# Patient Record
Sex: FEMALE | Race: WHITE | NOT HISPANIC OR LATINO | URBAN - METROPOLITAN AREA
[De-identification: names, ages, dates, MRNs, and addresses within clinical notes are randomized per-mention and may not be internally consistent; named-entity substitution may affect disease eponyms.]

---

## 2017-04-01 ENCOUNTER — EMERGENCY (EMERGENCY)
Facility: HOSPITAL | Age: 27
LOS: 0 days | Discharge: HOME | End: 2017-04-01

## 2017-05-16 PROBLEM — Z00.00 ENCOUNTER FOR PREVENTIVE HEALTH EXAMINATION: Status: ACTIVE | Noted: 2017-05-16

## 2017-05-25 ENCOUNTER — APPOINTMENT (OUTPATIENT)
Dept: INTERNAL MEDICINE | Facility: CLINIC | Age: 27
End: 2017-05-25

## 2017-06-27 DIAGNOSIS — Z79.899 OTHER LONG TERM (CURRENT) DRUG THERAPY: ICD-10-CM

## 2017-06-27 DIAGNOSIS — B37.9 CANDIDIASIS, UNSPECIFIED: ICD-10-CM

## 2017-06-27 DIAGNOSIS — E11.65 TYPE 2 DIABETES MELLITUS WITH HYPERGLYCEMIA: ICD-10-CM

## 2017-06-27 DIAGNOSIS — E87.6 HYPOKALEMIA: ICD-10-CM

## 2017-06-27 DIAGNOSIS — R53.1 WEAKNESS: ICD-10-CM

## 2017-06-27 DIAGNOSIS — E83.42 HYPOMAGNESEMIA: ICD-10-CM

## 2017-06-27 DIAGNOSIS — Z79.84 LONG TERM (CURRENT) USE OF ORAL HYPOGLYCEMIC DRUGS: ICD-10-CM

## 2017-10-07 ENCOUNTER — EMERGENCY (EMERGENCY)
Facility: HOSPITAL | Age: 27
LOS: 0 days | Discharge: HOME | End: 2017-10-07

## 2017-10-07 DIAGNOSIS — S16.1XXA STRAIN OF MUSCLE, FASCIA AND TENDON AT NECK LEVEL, INITIAL ENCOUNTER: ICD-10-CM

## 2017-10-07 DIAGNOSIS — Z79.51 LONG TERM (CURRENT) USE OF INHALED STEROIDS: ICD-10-CM
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
## 2017-10-07 DIAGNOSIS — J45.909 UNSPECIFIED ASTHMA, UNCOMPLICATED: ICD-10-CM

## 2017-10-07 DIAGNOSIS — Z79.899 OTHER LONG TERM (CURRENT) DRUG THERAPY: ICD-10-CM

## 2017-10-07 DIAGNOSIS — E11.9 TYPE 2 DIABETES MELLITUS WITHOUT COMPLICATIONS: ICD-10-CM
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
## 2017-10-07 DIAGNOSIS — M54.2 CERVICALGIA: ICD-10-CM
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
## 2019-01-12 ENCOUNTER — EMERGENCY (EMERGENCY)
Facility: HOSPITAL | Age: 29
LOS: 0 days | Discharge: HOME | End: 2019-01-12
Attending: EMERGENCY MEDICINE | Admitting: EMERGENCY MEDICINE

## 2019-01-12 VITALS
OXYGEN SATURATION: 97 % | HEART RATE: 100 BPM | TEMPERATURE: 99 F | RESPIRATION RATE: 20 BRPM | SYSTOLIC BLOOD PRESSURE: 132 MMHG | DIASTOLIC BLOOD PRESSURE: 85 MMHG

## 2019-01-12 DIAGNOSIS — Z79.2 LONG TERM (CURRENT) USE OF ANTIBIOTICS: ICD-10-CM

## 2019-01-12 DIAGNOSIS — Z79.899 OTHER LONG TERM (CURRENT) DRUG THERAPY: ICD-10-CM

## 2019-01-12 DIAGNOSIS — M79.669 PAIN IN UNSPECIFIED LOWER LEG: ICD-10-CM

## 2019-01-12 DIAGNOSIS — E11.9 TYPE 2 DIABETES MELLITUS WITHOUT COMPLICATIONS: ICD-10-CM

## 2019-01-12 DIAGNOSIS — L02.416 CUTANEOUS ABSCESS OF LEFT LOWER LIMB: ICD-10-CM

## 2019-01-12 DIAGNOSIS — J45.909 UNSPECIFIED ASTHMA, UNCOMPLICATED: ICD-10-CM

## 2019-01-12 DIAGNOSIS — Z91.14 PATIENT'S OTHER NONCOMPLIANCE WITH MEDICATION REGIMEN: ICD-10-CM

## 2019-01-12 DIAGNOSIS — Z79.84 LONG TERM (CURRENT) USE OF ORAL HYPOGLYCEMIC DRUGS: ICD-10-CM

## 2019-01-12 RX ORDER — CEPHALEXIN 500 MG
1 CAPSULE ORAL
Qty: 30 | Refills: 0
Start: 2019-01-12 | End: 2019-01-21

## 2019-01-12 RX ORDER — METFORMIN HYDROCHLORIDE 850 MG/1
2 TABLET ORAL
Qty: 60 | Refills: 0
Start: 2019-01-12

## 2019-01-12 NOTE — ED PROVIDER NOTE - ATTENDING CONTRIBUTION TO CARE
I personally evaluated the patient. I reviewed the Resident’s or Physician Assistant’s note (as assigned above), and agree with the findings and plan except as documented in my note.    27 yo female hx of DM, eczema presents w left popliteal area abscess. Patient had an I&D performed w purulent drainage. Abx prescribed. Patient recommended to come back for wound check next week. Extensive return precautions provided. Gen Surgery f/u info given.

## 2019-01-12 NOTE — ED ADULT NURSE NOTE - NSIMPLEMENTINTERV_GEN_ALL_ED
Implemented All Universal Safety Interventions:  [place] to call system. Call bell, personal items and telephone within reach. Instruct patient to call for assistance. Room bathroom lighting operational. Non-slip footwear when patient is off stretcher. Physically safe environment: no spills, clutter or unnecessary equipment. Stretcher in lowest position, wheels locked, appropriate side rails in place.

## 2019-01-12 NOTE — ED PROVIDER NOTE - OBJECTIVE STATEMENT
29 yo female hx of DM (non-compliant to medication) and eczema present c/o pain and swelling and redness behind of her knee worsening over the past 2 days. reported she has been scratching her skin 2/2 her eczema. palpation and movement worsen the pain. denies fever/chill/red streaking. denies chest pain and sob.

## 2019-01-12 NOTE — ED PROVIDER NOTE - NS ED ROS FT
Constitutional: no fever, chills, no recent weight loss, change in appetite or malaise  Cardiac: No chest pain, SOB or edema.  Respiratory: No cough or respiratory distress  MS: no pain to back or extremities, no loss of ROM, no weakness  Skin: see HPI

## 2019-01-12 NOTE — ED PROVIDER NOTE - NSFOLLOWUPINSTRUCTIONS_ED_ALL_ED_FT
Abscess    An abscess is an infected area that contains a collection of pus and debris. It can occur in almost any part of the body and occurs when the tissue gets infection. Symptoms include a painful mass that is red, warm, tender that might break open and have drainage. If your health care provider gave you antibiotics make sure to take the full course and do not stop even if feeling better.     SEEK MEDICAL CARE IF YOU HAVE THE FOLLOWING SYMPTOMS: chills, fever, muscle aches, or red streaking from the area.

## 2019-01-12 NOTE — ED PROVIDER NOTE - CARE PLAN
Principal Discharge DX:	Abscess of lower leg  Secondary Diagnosis:	Diabetes  Secondary Diagnosis:	[place] Principal Discharge DX:	Abscess of lower leg  Secondary Diagnosis:	Diabetes

## 2019-01-12 NOTE — ED PROVIDER NOTE - PHYSICAL EXAMINATION
CONSTITUTIONAL: Well-appearing; well-nourished; in no apparent distress.   MS: left knee with FROM.   SKIN: + 2cm abscess behind the left knee with surrounding erythema and tenderness. + softness and fluctuant

## 2019-01-12 NOTE — ED PROVIDER NOTE - MEDICAL DECISION MAKING DETAILS
27 yo female hx of DM, eczema presents w left popliteal area abscess. Patient had an I&D performed w purulent drainage. Abx prescribed. Patient recommended to come back for wound check next week. Extensive return precautions provided. Gen Surgery f/u info given. I have full discussed the medical management and delivery of care with the patient. Patient confirms understanding and has been given detailed return precautions. Patient instructed to return to the ED should symptoms persist or worsen. Patient is well appearing upon discharge.

## 2019-01-18 ENCOUNTER — EMERGENCY (EMERGENCY)
Facility: HOSPITAL | Age: 29
LOS: 0 days | Discharge: HOME | End: 2019-01-18
Attending: EMERGENCY MEDICINE | Admitting: EMERGENCY MEDICINE

## 2019-01-18 VITALS
OXYGEN SATURATION: 98 % | SYSTOLIC BLOOD PRESSURE: 111 MMHG | RESPIRATION RATE: 16 BRPM | HEART RATE: 93 BPM | TEMPERATURE: 97 F | DIASTOLIC BLOOD PRESSURE: 86 MMHG

## 2019-01-18 DIAGNOSIS — Z48.00 ENCOUNTER FOR CHANGE OR REMOVAL OF NONSURGICAL WOUND DRESSING: ICD-10-CM

## 2019-01-18 DIAGNOSIS — J45.909 UNSPECIFIED ASTHMA, UNCOMPLICATED: ICD-10-CM

## 2019-01-18 DIAGNOSIS — Z79.84 LONG TERM (CURRENT) USE OF ORAL HYPOGLYCEMIC DRUGS: ICD-10-CM

## 2019-01-18 DIAGNOSIS — L02.416 CUTANEOUS ABSCESS OF LEFT LOWER LIMB: ICD-10-CM

## 2019-01-18 DIAGNOSIS — Z79.2 LONG TERM (CURRENT) USE OF ANTIBIOTICS: ICD-10-CM

## 2019-01-18 DIAGNOSIS — E11.9 TYPE 2 DIABETES MELLITUS WITHOUT COMPLICATIONS: ICD-10-CM

## 2019-01-18 DIAGNOSIS — Z79.899 OTHER LONG TERM (CURRENT) DRUG THERAPY: ICD-10-CM

## 2019-01-18 NOTE — ED PROVIDER NOTE - OBJECTIVE STATEMENT
29 y/o F with hx of L popliteal abscess drained by me last week presents for wound check. Pt states the redness improved greatly. Pain resolved. No fevers, chills. No nausea, vomiting.

## 2019-01-18 NOTE — ED PROVIDER NOTE - MEDICAL DECISION MAKING DETAILS
Patient wound healing well. She will f/u with her PMD. Return precautions given. I have full discussed the medical management and delivery of care with the patient. Patient confirms understanding and has been given detailed return precautions. Patient instructed to return to the ED should symptoms persist or worsen. Patient is well appearing upon discharge.

## 2019-01-18 NOTE — ED ADULT NURSE NOTE - NSIMPLEMENTINTERV_GEN_ALL_ED
Implemented All Universal Safety Interventions:  [place] to call system. Call bell, personal items and telephone within reach. Instruct patient to call for assistance. Room bathroom lighting operational. Non-slip footwear when patient is off stretcher. Physically safe environment: no spills, clutter or unnecessary equipment. Stretcher in lowest position, wheels locked, appropriate side rails in place.

## 2019-01-19 PROBLEM — J45.909 UNSPECIFIED ASTHMA, UNCOMPLICATED: Chronic | Status: ACTIVE | Noted: 2019-01-12

## 2019-01-19 PROBLEM — E11.9 TYPE 2 DIABETES MELLITUS WITHOUT COMPLICATIONS: Chronic | Status: ACTIVE | Noted: 2019-01-12

## 2019-06-02 ENCOUNTER — OUTPATIENT (OUTPATIENT)
Dept: OUTPATIENT SERVICES | Facility: HOSPITAL | Age: 29
LOS: 1 days | Discharge: HOME | End: 2019-06-02
Payer: COMMERCIAL

## 2019-06-02 DIAGNOSIS — O03.9 COMPLETE OR UNSPECIFIED SPONTANEOUS ABORTION WITHOUT COMPLICATION: ICD-10-CM

## 2019-06-02 DIAGNOSIS — R10.9 UNSPECIFIED ABDOMINAL PAIN: ICD-10-CM

## 2019-06-02 PROCEDURE — 76830 TRANSVAGINAL US NON-OB: CPT | Mod: 26

## 2019-06-06 ENCOUNTER — APPOINTMENT (OUTPATIENT)
Dept: OBGYN | Facility: CLINIC | Age: 29
End: 2019-06-06
Payer: COMMERCIAL

## 2019-06-06 ENCOUNTER — OUTPATIENT (OUTPATIENT)
Dept: OUTPATIENT SERVICES | Facility: HOSPITAL | Age: 29
LOS: 1 days | Discharge: HOME | End: 2019-06-06

## 2019-06-06 DIAGNOSIS — O03.9 COMPLETE OR UNSPECIFIED SPONTANEOUS ABORTION WITHOUT COMPLICATION: ICD-10-CM

## 2019-06-06 PROCEDURE — 99203 OFFICE O/P NEW LOW 30 MIN: CPT | Mod: 25

## 2019-06-06 PROCEDURE — 76817 TRANSVAGINAL US OBSTETRIC: CPT

## 2019-06-06 NOTE — PROCEDURE
[Abnormal Uterine Bleeding] : abnormal uterine bleeding [Pelvic Sonogram] : pelvic sonogram [Present] : uterus present [Anteverted] : anteverted [L: ___ cm] : L: [unfilled] cm [W: ___cm] : W: [unfilled] cm [H: ___ cm] : H: [unfilled] cm [FreeTextEntry5] : Endometrium .3cm.  [FreeTextEntry7] : 2 x 1 [FreeTextEntry8] : 2.6 x 2.7 polycystic [FreeTextEntry4] : Normal sonogram.

## 2019-06-06 NOTE — PHYSICAL EXAM
[Normal] : uterus [Scant] : there was scant vaginal bleeding [Old Blood] : old blood was present in the vagina [Uterine Adnexae] : were not tender and not enlarged [FreeTextEntry5] : closed.

## 2019-06-07 ENCOUNTER — OTHER (OUTPATIENT)
Age: 29
End: 2019-06-07

## 2019-06-07 LAB
ALBUMIN SERPL ELPH-MCNC: 4.6 G/DL
ALP BLD-CCNC: 93 U/L
ALT SERPL-CCNC: 23 U/L
ANION GAP SERPL CALC-SCNC: 15 MMOL/L
AST SERPL-CCNC: 13 U/L
BASOPHILS # BLD AUTO: 0.07 K/UL
BASOPHILS NFR BLD AUTO: 0.8 %
BILIRUB SERPL-MCNC: <0.2 MG/DL
BUN SERPL-MCNC: 17 MG/DL
CALCIUM SERPL-MCNC: 9.8 MG/DL
CHLORIDE SERPL-SCNC: 101 MMOL/L
CO2 SERPL-SCNC: 25 MMOL/L
CREAT SERPL-MCNC: 0.7 MG/DL
EOSINOPHIL # BLD AUTO: 0.18 K/UL
EOSINOPHIL NFR BLD AUTO: 1.9 %
ESTIMATED AVERAGE GLUCOSE: 151 MG/DL
GLUCOSE SERPL-MCNC: 118 MG/DL
HBA1C MFR BLD HPLC: 6.9 %
HCG SERPL-MCNC: 54.7 MIU/ML
HCT VFR BLD CALC: 44.6 %
HGB BLD-MCNC: 13.6 G/DL
HIV1+2 AB SPEC QL IA.RAPID: NONREACTIVE
IMM GRANULOCYTES NFR BLD AUTO: 0.3 %
LYMPHOCYTES # BLD AUTO: 2.43 K/UL
LYMPHOCYTES NFR BLD AUTO: 26 %
MAN DIFF?: NORMAL
MCHC RBC-ENTMCNC: 24.8 PG
MCHC RBC-ENTMCNC: 30.5 G/DL
MCV RBC AUTO: 81.4 FL
MONOCYTES # BLD AUTO: 0.52 K/UL
MONOCYTES NFR BLD AUTO: 5.6 %
NEUTROPHILS # BLD AUTO: 6.1 K/UL
NEUTROPHILS NFR BLD AUTO: 65.4 %
PLATELET # BLD AUTO: 338 K/UL
POTASSIUM SERPL-SCNC: 4 MMOL/L
PROGEST SERPL-MCNC: 0.2 NG/ML
PROT SERPL-MCNC: 7.1 G/DL
RBC # BLD: 5.48 M/UL
RBC # FLD: 14.7 %
SODIUM SERPL-SCNC: 141 MMOL/L
WBC # FLD AUTO: 9.33 K/UL

## 2019-06-11 ENCOUNTER — OUTPATIENT (OUTPATIENT)
Dept: OUTPATIENT SERVICES | Facility: HOSPITAL | Age: 29
LOS: 1 days | Discharge: HOME | End: 2019-06-11
Payer: COMMERCIAL

## 2019-06-11 VITALS
TEMPERATURE: 98 F | DIASTOLIC BLOOD PRESSURE: 70 MMHG | HEART RATE: 98 BPM | RESPIRATION RATE: 16 BRPM | HEIGHT: 64 IN | SYSTOLIC BLOOD PRESSURE: 98 MMHG | OXYGEN SATURATION: 98 % | WEIGHT: 236.12 LBS

## 2019-06-11 DIAGNOSIS — Z01.818 ENCOUNTER FOR OTHER PREPROCEDURAL EXAMINATION: ICD-10-CM

## 2019-06-11 DIAGNOSIS — O02.1 MISSED ABORTION: ICD-10-CM

## 2019-06-11 LAB
ANION GAP SERPL CALC-SCNC: 14 MMOL/L — SIGNIFICANT CHANGE UP (ref 7–14)
APPEARANCE UR: CLEAR — SIGNIFICANT CHANGE UP
BASOPHILS # BLD AUTO: 0.05 K/UL — SIGNIFICANT CHANGE UP (ref 0–0.2)
BASOPHILS NFR BLD AUTO: 0.5 % — SIGNIFICANT CHANGE UP (ref 0–1)
BILIRUB UR-MCNC: NEGATIVE — SIGNIFICANT CHANGE UP
BLD GP AB SCN SERPL QL: SIGNIFICANT CHANGE UP
BUN SERPL-MCNC: 22 MG/DL — HIGH (ref 10–20)
CALCIUM SERPL-MCNC: 9.8 MG/DL — SIGNIFICANT CHANGE UP (ref 8.5–10.1)
CHLORIDE SERPL-SCNC: 102 MMOL/L — SIGNIFICANT CHANGE UP (ref 98–110)
CO2 SERPL-SCNC: 25 MMOL/L — SIGNIFICANT CHANGE UP (ref 17–32)
COLOR SPEC: YELLOW — SIGNIFICANT CHANGE UP
CREAT SERPL-MCNC: 0.7 MG/DL — SIGNIFICANT CHANGE UP (ref 0.7–1.5)
DIFF PNL FLD: ABNORMAL
EOSINOPHIL # BLD AUTO: 0.16 K/UL — SIGNIFICANT CHANGE UP (ref 0–0.7)
EOSINOPHIL NFR BLD AUTO: 1.5 % — SIGNIFICANT CHANGE UP (ref 0–8)
EPI CELLS # UR: ABNORMAL /HPF
ESTIMATED AVERAGE GLUCOSE: 151 MG/DL — HIGH (ref 68–114)
GLUCOSE SERPL-MCNC: 115 MG/DL — HIGH (ref 70–99)
GLUCOSE UR QL: >=1000
HBA1C BLD-MCNC: 6.9 % — HIGH (ref 4–5.6)
HCT VFR BLD CALC: 44.7 % — SIGNIFICANT CHANGE UP (ref 37–47)
HGB BLD-MCNC: 13.7 G/DL — SIGNIFICANT CHANGE UP (ref 12–16)
IMM GRANULOCYTES NFR BLD AUTO: 0.3 % — SIGNIFICANT CHANGE UP (ref 0.1–0.3)
KETONES UR-MCNC: NEGATIVE — SIGNIFICANT CHANGE UP
LEUKOCYTE ESTERASE UR-ACNC: NEGATIVE — SIGNIFICANT CHANGE UP
LYMPHOCYTES # BLD AUTO: 2.38 K/UL — SIGNIFICANT CHANGE UP (ref 1.2–3.4)
LYMPHOCYTES # BLD AUTO: 22.9 % — SIGNIFICANT CHANGE UP (ref 20.5–51.1)
MCHC RBC-ENTMCNC: 24.4 PG — LOW (ref 27–31)
MCHC RBC-ENTMCNC: 30.6 G/DL — LOW (ref 32–37)
MCV RBC AUTO: 79.7 FL — LOW (ref 81–99)
MONOCYTES # BLD AUTO: 0.63 K/UL — HIGH (ref 0.1–0.6)
MONOCYTES NFR BLD AUTO: 6.1 % — SIGNIFICANT CHANGE UP (ref 1.7–9.3)
NEUTROPHILS # BLD AUTO: 7.16 K/UL — HIGH (ref 1.4–6.5)
NEUTROPHILS NFR BLD AUTO: 68.7 % — SIGNIFICANT CHANGE UP (ref 42.2–75.2)
NITRITE UR-MCNC: NEGATIVE — SIGNIFICANT CHANGE UP
NRBC # BLD: 0 /100 WBCS — SIGNIFICANT CHANGE UP (ref 0–0)
PH UR: 6.5 — SIGNIFICANT CHANGE UP (ref 5–8)
PLATELET # BLD AUTO: 368 K/UL — SIGNIFICANT CHANGE UP (ref 130–400)
POTASSIUM SERPL-MCNC: 4.5 MMOL/L — SIGNIFICANT CHANGE UP (ref 3.5–5)
POTASSIUM SERPL-SCNC: 4.5 MMOL/L — SIGNIFICANT CHANGE UP (ref 3.5–5)
PROT UR-MCNC: NEGATIVE — SIGNIFICANT CHANGE UP
RBC # BLD: 5.61 M/UL — HIGH (ref 4.2–5.4)
RBC # FLD: 14.6 % — HIGH (ref 11.5–14.5)
RBC CASTS # UR COMP ASSIST: ABNORMAL /HPF
SODIUM SERPL-SCNC: 141 MMOL/L — SIGNIFICANT CHANGE UP (ref 135–146)
SP GR SPEC: >=1.03 — SIGNIFICANT CHANGE UP (ref 1.01–1.03)
UROBILINOGEN FLD QL: 0.2 — SIGNIFICANT CHANGE UP (ref 0.2–0.2)
WBC # BLD: 10.41 K/UL — SIGNIFICANT CHANGE UP (ref 4.8–10.8)
WBC # FLD AUTO: 10.41 K/UL — SIGNIFICANT CHANGE UP (ref 4.8–10.8)

## 2019-06-11 PROCEDURE — 93010 ELECTROCARDIOGRAM REPORT: CPT

## 2019-06-11 NOTE — H&P PST ADULT - REASON FOR ADMISSION
27 yo female presents s/p miscarriage, "the numbers are not going down the way it should, I need a d&c";  denies chest pain, palpitations, shortness of breath, dyspnea, or dysuria. exercise tolerance: 2+ blocks/ flights of stairs w/o sob

## 2019-06-14 ENCOUNTER — OUTPATIENT (OUTPATIENT)
Dept: OUTPATIENT SERVICES | Facility: HOSPITAL | Age: 29
LOS: 1 days | Discharge: HOME | End: 2019-06-14
Payer: COMMERCIAL

## 2019-06-14 ENCOUNTER — RESULT REVIEW (OUTPATIENT)
Age: 29
End: 2019-06-14

## 2019-06-14 VITALS
TEMPERATURE: 98 F | DIASTOLIC BLOOD PRESSURE: 55 MMHG | RESPIRATION RATE: 20 BRPM | HEART RATE: 98 BPM | HEIGHT: 64 IN | WEIGHT: 236.12 LBS | SYSTOLIC BLOOD PRESSURE: 119 MMHG

## 2019-06-14 VITALS
SYSTOLIC BLOOD PRESSURE: 103 MMHG | DIASTOLIC BLOOD PRESSURE: 54 MMHG | TEMPERATURE: 98 F | HEART RATE: 84 BPM | RESPIRATION RATE: 20 BRPM | OXYGEN SATURATION: 96 %

## 2019-06-14 LAB
GLUCOSE BLDC GLUCOMTR-MCNC: 139 MG/DL — HIGH (ref 70–99)
HCG SERPL-ACNC: 9.7 MIU/ML — HIGH

## 2019-06-14 PROCEDURE — 88305 TISSUE EXAM BY PATHOLOGIST: CPT | Mod: 26

## 2019-06-14 PROCEDURE — 59820 CARE OF MISCARRIAGE: CPT

## 2019-06-14 RX ORDER — SODIUM CHLORIDE 9 MG/ML
1000 INJECTION, SOLUTION INTRAVENOUS
Refills: 0 | Status: DISCONTINUED | OUTPATIENT
Start: 2019-06-14 | End: 2019-06-29

## 2019-06-14 RX ORDER — ACETAMINOPHEN 500 MG
1000 TABLET ORAL ONCE
Refills: 0 | Status: COMPLETED | OUTPATIENT
Start: 2019-06-14 | End: 2019-06-14

## 2019-06-14 RX ORDER — ONDANSETRON 8 MG/1
4 TABLET, FILM COATED ORAL ONCE
Refills: 0 | Status: DISCONTINUED | OUTPATIENT
Start: 2019-06-14 | End: 2019-06-29

## 2019-06-14 RX ADMIN — Medication 400 MILLIGRAM(S): at 12:31

## 2019-06-14 RX ADMIN — SODIUM CHLORIDE 100 MILLILITER(S): 9 INJECTION, SOLUTION INTRAVENOUS at 11:45

## 2019-06-14 NOTE — ASU PREOP CHECKLIST - TAMPON REMOVED
Patient : Corby Talavera Age: 29 year old Sex: female   MRN: [id number] Encounter Date: 11/29/2018      History     Chief Complaint   Patient presents with   • Derm Problem     HPI  Patient presents to the ED with swelling to the right side of the forehead, small, painful, started few days ago, currently not draining. Denies any fever, nausea or vomiting.  No Known Allergies       Medication List      Prior to Admission Medications      Sig   * albuterol 108 (90 Base) MCG/ACT inhaler   2 puffs, Inhalation, EVERY 4 HOURS PRN     * albuterol (2.5 MG/3ML) 0.083% nebulizer solution  Commonly known as:  VENTOLIN   2.5 mg, Nebulization, EVERY 6 HOURS PRN     ALBUTEROL IN   Inhalation     CLARITIN PO   Oral     ferrous sulfate 325 (65 FE) MG tablet   325 mg, DAILY WITH BREAKFAST     ibuprofen 600 MG tablet  Commonly known as:  MOTRIN   600 mg, Oral, EVERY 8 HOURS PRN     LORazepam 0.5 MG tablet  Commonly known as:  ATIVAN   0.5 mg, EVERY 6 HOURS PRN     sertraline 50 MG tablet  Commonly known as:  ZOLOFT   50 mg, DAILY     SINGULAIR PO   Oral     sulfamethoxazole-trimethoprim 800-160 MG per tablet  Commonly known as:  BACTRIM DS   1 tablet, Oral, 2 TIMES DAILY         * There are duplicate medications prescribed to the patient                Past Medical History:   Diagnosis Date   • Anemia    • Anxiety    • Depression    • Hypoglycemia    • Mental disorder    • PTSD (post-traumatic stress disorder)    • RAD (reactive airway disease)    • Seizures (CMS/HCC)     last early 2015       No past surgical history on file.    No family history on file.    Social History     Tobacco Use   • Smoking status: Never Smoker   • Smokeless tobacco: Never Used   Substance Use Topics   • Alcohol use: No     Alcohol/week: 0.0 oz   • Drug use: No       Review of Systems   Constitutional: Negative for fever.   Respiratory: Negative for cough and shortness of breath.    Cardiovascular: Negative for chest pain.       Physical Exam     ED Triage  Vitals [11/28/18 2233]   ED Triage Vitals Group      Temp 97.4 °F (36.3 °C)      Pulse 87      Resp 18      /75      SpO2 100 %      EtCO2 mmHg       Height       Weight       Weight Scale Used        Physical Exam   Constitutional: She is oriented to person, place, and time.   HENT:   Head:       1 cm swelling to the right side of the forehead, fluctuant, drained successfully with aspiration, consistent with sebaceous cyst.   Cardiovascular: Normal rate and regular rhythm.   Pulmonary/Chest: Effort normal and breath sounds normal. She has no wheezes. She has no rales.   Neurological: She is alert and oriented to person, place, and time.     ED Course     Incision and Drainage  Date/Time: 11/29/2018 1:11 AM  Performed by: ABDIRASHID Koenig  Authorized by: ABDIRASHID Koenig     Consent:     Consent obtained:  Verbal    Consent given by:  Patient  Location:     Type:  Cyst    Size:  1 cm    Location:  Head    Head location:  Face  Pre-procedure details:     Skin preparation:  Chloraprep  Anesthesia (see MAR for exact dosages):     Anesthesia method:  Local infiltration    Local anesthetic:  Lidocaine 1% w/o epi  Procedure type:     Complexity:  Simple  Procedure details:     Needle aspiration: yes      Needle size:  18 G    Incision types:  Single straight  Post-procedure details:     Patient tolerance of procedure:  Tolerated well, no immediate complications        Lab Results     No results found for this visit on 11/29/18.    Radiology Results     Imaging Results    None         ED Medication Orders (From admission, onward)    Start Ordered     Status Ordering Provider    11/29/18 0102 11/29/18 0102  LIDOCAINE HCL 1 % IJ JORGE LUIS Zuniga Override     Comments:  Bence, Laura: cabinet override    Ordered     11/29/18 0058 11/29/18 0052  lidocaine HCl (XYLOCAINE) 1 % injection 50 mg  ONCE      Acknowledged [place] AYALA           MDM    Clinical Impression     ED Diagnosis   1. Sebaceous cyst         Disposition      1:11 AM  Recheck on patient. Patient was diagnosed with specialist, drained successfully with aspiration, she was instructed follow-up with PCP and she presents understanding. Discussed with patient ED findings and plan for discharge. Patient was given ED warnings, discharge instructions, and follow up information to go home with. Patient understands and agrees with plan for discharge. Any questions have been answered.    Discharge 11/29/2018  1:17 AM  Corby Talavera discharge to home/self care.         ABDIRASHID Koenig  11/29/18 0117     n/a

## 2019-06-14 NOTE — CHART NOTE - NSCHARTNOTEFT_GEN_A_CORE
PACU ANESTHESIA ADMISSION NOTE      Procedure: Dilation and curettage, uterus    Post op diagnosis:  Missed       ____  Intubated  TV:______       Rate: ______      FiO2: ______    _X___  Patent Airway    __X__  Full return of protective reflexes    __X_  Full recovery from anesthesia / back to baseline     Vitals:   T:   98.1        R: 14                 BP: 139/82                 Sat:96                   P: 86      Mental Status:  ___X_ Awake   _____ Alert   _____ Drowsy   _____ Sedated    Nausea/Vomiting:  _X___ NO  ______Yes,   See Post - Op Orders          Pain Scale (0-10):  __0___    Treatment: ____ None    ____ See Post - Op/PCA Orders    Post - Operative Fluids:   ____ Oral   X____ See Post - Op Orders    Plan: Discharge:   _X__Home       _____Floor     _____Critical Care    _____  Other:_________________    Comments:  Uneventful course of anesthesia

## 2019-06-14 NOTE — BRIEF OPERATIVE NOTE - OPERATION/FINDINGS
6-8 week sized anteverted uterus   scant amount of products of conception   cervix C/L/P 6-8 week sized anteverted uterus   scant amounts of products of conception   cervix C/L/P

## 2019-06-17 LAB — SURGICAL PATHOLOGY STUDY: SIGNIFICANT CHANGE UP

## 2019-06-18 PROBLEM — L30.9 DERMATITIS, UNSPECIFIED: Chronic | Status: ACTIVE | Noted: 2019-06-11

## 2019-06-20 ENCOUNTER — APPOINTMENT (OUTPATIENT)
Dept: OBGYN | Facility: CLINIC | Age: 29
End: 2019-06-20
Payer: COMMERCIAL

## 2019-06-20 VITALS
HEIGHT: 64 IN | BODY MASS INDEX: 40.97 KG/M2 | WEIGHT: 240 LBS | SYSTOLIC BLOOD PRESSURE: 130 MMHG | DIASTOLIC BLOOD PRESSURE: 62 MMHG

## 2019-06-20 VITALS — WEIGHT: 240 LBS | BODY MASS INDEX: 40.97 KG/M2 | HEIGHT: 64 IN

## 2019-06-20 DIAGNOSIS — E11.9 TYPE 2 DIABETES MELLITUS WITHOUT COMPLICATIONS: ICD-10-CM

## 2019-06-20 DIAGNOSIS — Z87.59 PERSONAL HISTORY OF OTHER COMPLICATIONS OF PREGNANCY, CHILDBIRTH AND THE PUERPERIUM: ICD-10-CM

## 2019-06-20 DIAGNOSIS — E66.9 OBESITY, UNSPECIFIED: ICD-10-CM

## 2019-06-20 DIAGNOSIS — O02.1 MISSED ABORTION: ICD-10-CM

## 2019-06-20 DIAGNOSIS — J45.909 UNSPECIFIED ASTHMA, UNCOMPLICATED: ICD-10-CM

## 2019-06-20 PROCEDURE — 99024 POSTOP FOLLOW-UP VISIT: CPT

## 2019-12-19 ENCOUNTER — OUTPATIENT (OUTPATIENT)
Dept: OUTPATIENT SERVICES | Facility: HOSPITAL | Age: 29
LOS: 1 days | Discharge: HOME | End: 2019-12-19

## 2019-12-19 ENCOUNTER — APPOINTMENT (OUTPATIENT)
Dept: OBGYN | Facility: CLINIC | Age: 29
End: 2019-12-19
Payer: COMMERCIAL

## 2019-12-19 VITALS
SYSTOLIC BLOOD PRESSURE: 126 MMHG | DIASTOLIC BLOOD PRESSURE: 68 MMHG | WEIGHT: 255 LBS | HEIGHT: 64 IN | BODY MASS INDEX: 43.54 KG/M2

## 2019-12-19 DIAGNOSIS — Z01.419 ENCOUNTER FOR GYNECOLOGICAL EXAMINATION (GENERAL) (ROUTINE) WITHOUT ABNORMAL FINDINGS: ICD-10-CM

## 2019-12-19 PROCEDURE — 99395 PREV VISIT EST AGE 18-39: CPT

## 2019-12-28 LAB
ESTRADIOL SERPL-MCNC: 21 PG/ML
FSH SERPL-MCNC: 8.3 IU/L
HPV HIGH+LOW RISK DNA PNL CVX: NOT DETECTED
PROLACTIN SERPL-MCNC: 7.6 NG/ML
T4 FREE SERPL-MCNC: 1.3 NG/DL
TESTOST BND SERPL-MCNC: 3.2 PG/ML
TESTOST SERPL-MCNC: 16.1 NG/DL

## 2020-01-02 ENCOUNTER — OTHER (OUTPATIENT)
Age: 30
End: 2020-01-02

## 2020-01-02 LAB
A VAGINAE DNA VAG QL NAA+PROBE: NORMAL
BVAB2 DNA VAG QL NAA+PROBE: NORMAL
C KRUSEI DNA VAG QL NAA+PROBE: NEGATIVE
C TRACH RRNA SPEC QL NAA+PROBE: NEGATIVE
ESTIMATED AVERAGE GLUCOSE: 177 MG/DL
HBA1C MFR BLD HPLC: 7.8 %
MEGA1 DNA VAG QL NAA+PROBE: NORMAL
N GONORRHOEA RRNA SPEC QL NAA+PROBE: NEGATIVE
T VAGINALIS RRNA SPEC QL NAA+PROBE: NEGATIVE
TSH SERPL-ACNC: 4.66 UIU/ML

## 2020-07-20 NOTE — ASU PREOP CHECKLIST - IDENTIFICATION BAND VERIFIED
done Minoxidil Counseling: Minoxidil is a topical medication which can increase blood flow where it is applied. It is uncertain how this medication increases hair growth. Side effects are uncommon and include stinging and allergic reactions.

## 2021-12-13 ENCOUNTER — EMERGENCY (EMERGENCY)
Facility: HOSPITAL | Age: 31
LOS: 0 days | Discharge: HOME | End: 2021-12-13
Attending: EMERGENCY MEDICINE | Admitting: EMERGENCY MEDICINE
Payer: MEDICAID

## 2021-12-13 VITALS
SYSTOLIC BLOOD PRESSURE: 139 MMHG | TEMPERATURE: 98 F | OXYGEN SATURATION: 100 % | WEIGHT: 250 LBS | HEIGHT: 64 IN | DIASTOLIC BLOOD PRESSURE: 83 MMHG | RESPIRATION RATE: 20 BRPM | HEART RATE: 123 BPM

## 2021-12-13 VITALS
HEART RATE: 100 BPM | DIASTOLIC BLOOD PRESSURE: 56 MMHG | SYSTOLIC BLOOD PRESSURE: 128 MMHG | RESPIRATION RATE: 19 BRPM | OXYGEN SATURATION: 100 %

## 2021-12-13 DIAGNOSIS — R00.2 PALPITATIONS: ICD-10-CM

## 2021-12-13 DIAGNOSIS — J45.909 UNSPECIFIED ASTHMA, UNCOMPLICATED: ICD-10-CM

## 2021-12-13 DIAGNOSIS — E11.9 TYPE 2 DIABETES MELLITUS WITHOUT COMPLICATIONS: ICD-10-CM

## 2021-12-13 DIAGNOSIS — F41.9 ANXIETY DISORDER, UNSPECIFIED: ICD-10-CM

## 2021-12-13 DIAGNOSIS — R00.0 TACHYCARDIA, UNSPECIFIED: ICD-10-CM

## 2021-12-13 LAB
ALBUMIN SERPL ELPH-MCNC: 4.5 G/DL — SIGNIFICANT CHANGE UP (ref 3.5–5.2)
ALP SERPL-CCNC: 126 U/L — HIGH (ref 30–115)
ALT FLD-CCNC: 27 U/L — SIGNIFICANT CHANGE UP (ref 0–41)
ANION GAP SERPL CALC-SCNC: 16 MMOL/L — HIGH (ref 7–14)
AST SERPL-CCNC: 14 U/L — SIGNIFICANT CHANGE UP (ref 0–41)
BASOPHILS # BLD AUTO: 0.06 K/UL — SIGNIFICANT CHANGE UP (ref 0–0.2)
BASOPHILS NFR BLD AUTO: 0.6 % — SIGNIFICANT CHANGE UP (ref 0–1)
BILIRUB SERPL-MCNC: <0.2 MG/DL — SIGNIFICANT CHANGE UP (ref 0.2–1.2)
BUN SERPL-MCNC: 13 MG/DL — SIGNIFICANT CHANGE UP (ref 10–20)
CALCIUM SERPL-MCNC: 9.1 MG/DL — SIGNIFICANT CHANGE UP (ref 8.5–10.1)
CHLORIDE SERPL-SCNC: 100 MMOL/L — SIGNIFICANT CHANGE UP (ref 98–110)
CO2 SERPL-SCNC: 21 MMOL/L — SIGNIFICANT CHANGE UP (ref 17–32)
CREAT SERPL-MCNC: 0.6 MG/DL — LOW (ref 0.7–1.5)
D DIMER BLD IA.RAPID-MCNC: 36 NG/ML DDU — SIGNIFICANT CHANGE UP (ref 0–230)
EOSINOPHIL # BLD AUTO: 0.38 K/UL — SIGNIFICANT CHANGE UP (ref 0–0.7)
EOSINOPHIL NFR BLD AUTO: 3.6 % — SIGNIFICANT CHANGE UP (ref 0–8)
GLUCOSE SERPL-MCNC: 156 MG/DL — HIGH (ref 70–99)
HCG SERPL QL: NEGATIVE — SIGNIFICANT CHANGE UP
HCT VFR BLD CALC: 46.7 % — SIGNIFICANT CHANGE UP (ref 37–47)
HGB BLD-MCNC: 14.4 G/DL — SIGNIFICANT CHANGE UP (ref 12–16)
IMM GRANULOCYTES NFR BLD AUTO: 0.5 % — HIGH (ref 0.1–0.3)
LYMPHOCYTES # BLD AUTO: 2.5 K/UL — SIGNIFICANT CHANGE UP (ref 1.2–3.4)
LYMPHOCYTES # BLD AUTO: 23.6 % — SIGNIFICANT CHANGE UP (ref 20.5–51.1)
MAGNESIUM SERPL-MCNC: 2 MG/DL — SIGNIFICANT CHANGE UP (ref 1.8–2.4)
MCHC RBC-ENTMCNC: 24.3 PG — LOW (ref 27–31)
MCHC RBC-ENTMCNC: 30.8 G/DL — LOW (ref 32–37)
MCV RBC AUTO: 78.9 FL — LOW (ref 81–99)
MONOCYTES # BLD AUTO: 0.51 K/UL — SIGNIFICANT CHANGE UP (ref 0.1–0.6)
MONOCYTES NFR BLD AUTO: 4.8 % — SIGNIFICANT CHANGE UP (ref 1.7–9.3)
NEUTROPHILS # BLD AUTO: 7.08 K/UL — HIGH (ref 1.4–6.5)
NEUTROPHILS NFR BLD AUTO: 66.9 % — SIGNIFICANT CHANGE UP (ref 42.2–75.2)
NRBC # BLD: 0 /100 WBCS — SIGNIFICANT CHANGE UP (ref 0–0)
NT-PROBNP SERPL-SCNC: 38 PG/ML — SIGNIFICANT CHANGE UP (ref 0–300)
PLATELET # BLD AUTO: 339 K/UL — SIGNIFICANT CHANGE UP (ref 130–400)
POTASSIUM SERPL-MCNC: 3.7 MMOL/L — SIGNIFICANT CHANGE UP (ref 3.5–5)
POTASSIUM SERPL-SCNC: 3.7 MMOL/L — SIGNIFICANT CHANGE UP (ref 3.5–5)
PROT SERPL-MCNC: 7.5 G/DL — SIGNIFICANT CHANGE UP (ref 6–8)
RBC # BLD: 5.92 M/UL — HIGH (ref 4.2–5.4)
RBC # FLD: 15.3 % — HIGH (ref 11.5–14.5)
SODIUM SERPL-SCNC: 137 MMOL/L — SIGNIFICANT CHANGE UP (ref 135–146)
TROPONIN T SERPL-MCNC: <0.01 NG/ML — SIGNIFICANT CHANGE UP
WBC # BLD: 10.58 K/UL — SIGNIFICANT CHANGE UP (ref 4.8–10.8)
WBC # FLD AUTO: 10.58 K/UL — SIGNIFICANT CHANGE UP (ref 4.8–10.8)

## 2021-12-13 PROCEDURE — 71045 X-RAY EXAM CHEST 1 VIEW: CPT | Mod: 26

## 2021-12-13 PROCEDURE — 99285 EMERGENCY DEPT VISIT HI MDM: CPT

## 2021-12-13 PROCEDURE — 93010 ELECTROCARDIOGRAM REPORT: CPT

## 2021-12-13 RX ORDER — HYDROXYZINE HCL 10 MG
50 TABLET ORAL ONCE
Refills: 0 | Status: COMPLETED | OUTPATIENT
Start: 2021-12-13 | End: 2021-12-13

## 2021-12-13 RX ORDER — METOPROLOL TARTRATE 50 MG
25 TABLET ORAL ONCE
Refills: 0 | Status: COMPLETED | OUTPATIENT
Start: 2021-12-13 | End: 2021-12-13

## 2021-12-13 RX ORDER — SODIUM CHLORIDE 9 MG/ML
1000 INJECTION, SOLUTION INTRAVENOUS ONCE
Refills: 0 | Status: COMPLETED | OUTPATIENT
Start: 2021-12-13 | End: 2021-12-13

## 2021-12-13 RX ADMIN — SODIUM CHLORIDE 1000 MILLILITER(S): 9 INJECTION, SOLUTION INTRAVENOUS at 02:19

## 2021-12-13 RX ADMIN — Medication 50 MILLIGRAM(S): at 02:19

## 2021-12-13 RX ADMIN — Medication 25 MILLIGRAM(S): at 03:55

## 2021-12-13 NOTE — ED PROVIDER NOTE - OBJECTIVE STATEMENT
30 yo F with PMHx DM, PVCs, and asthma presents to the ED c/o palpitations that started while at rest tonight. Pt states she intermittently gets palpitations but frequency has been increasing and tonight symptoms lasted longer than usual so she came to ED. Pt has previously seen cardiology and was prescribed Cardizem to take as needed (pt states her cardiologist gave it to her hesitantly so she never actually takes it). Pt states palpitations have been intermittent for past few months and started after her mother passed away. Pt is going to see psychiatrist as well to see if these symptoms might be related to anxiety. She denies other complaints. She is non-smoker, does not take any OCPs.

## 2021-12-13 NOTE — ED PROVIDER NOTE - NSFOLLOWUPINSTRUCTIONS_ED_ALL_ED_FT
.Heart Palpitations    WHAT YOU NEED TO KNOW:    Heart palpitations are feelings that your heart races, jumps, throbs, or flutters. You may feel extra beats, no beats for a short time, or skipped beats. You may have these feelings in your chest, throat, or neck. They may happen when you are sitting, standing, or lying. Heart palpitations may be frightening, but are usually not caused by a serious problem.     DISCHARGE INSTRUCTIONS:    Call 911 or have someone else call for any of the following:     You have any of the following signs of a heart attack:   Squeezing, pressure, or pain in your chest      You may also have any of the following:   Discomfort or pain in your back, neck, jaw, stomach, or arm      Shortness of breath      Nausea or vomiting      Lightheadedness or a sudden cold sweat      You have any of the following signs of a stroke:   Numbness or drooping on one side of your face       Weakness in an arm or leg      Confusion or difficulty speaking      Dizziness, a severe headache, or vision loss      You faint or lose consciousness.     Return to the emergency department if:     Your palpitations happen more often or get more intense.         Contact your healthcare provider if:     You have new or worsening swelling in your feet or ankles.      You have questions or concerns about your condition or care.    Follow up with your healthcare provider as directed: You may need to follow up with a cardiologist. You may need tests to check for heart problems that cause palpitations. Write down your questions so you remember to ask them during your visits.     Keep a record: Write down when your palpitations start and stop, what you were doing when they started, and your symptoms. Keep track of what you ate or drank within a few hours of your palpitations. Include anything that seemed to help your symptoms, such as lying down or holding your breath. This record will help you and your healthcare provider learn what triggers your palpitations. Bring this record with you to your follow up visits.    Help prevent heart palpitations:     Manage stress and anxiety. Find ways to relax such as listening to music, meditating, or doing yoga. Exercise can also help decrease stress and anxiety. Talk to someone you trust about your stress or anxiety. You can also talk to a therapist.       Get plenty of sleep every night. Ask your healthcare provider how much sleep you need each night.       Do not drink caffeine or alcohol. Caffeine and alcohol can make your palpitations worse. Caffeine is found in soda, coffee, tea, chocolate, and drinks that increase your energy.       Do not smoke. Nicotine and other chemicals in cigarettes and cigars may damage your heart and blood vessels. Ask your healthcare provider for information if you currently smoke and need help to quit. E-cigarettes or smokeless tobacco still contain nicotine. Talk to your healthcare provider before you use these products.       Do not use illegal drugs. Talk to your healthcare provider if you use illegal drugs and want help to quit.          © Copyright [employer] 2019 All illustrations and images included in CareNotes are the copyrighted property of [employer] or [employer].    Follow up with your cardiologist in 2 days.

## 2021-12-13 NOTE — ED PROVIDER NOTE - CLINICAL SUMMARY MEDICAL DECISION MAKING FREE TEXT BOX
Labs unremarkable.  EKG sinus tach vipin cute changes.  CXR negative.  Will D/C to follow up with cardiology.

## 2021-12-13 NOTE — ED PROVIDER NOTE - NS ED ROS FT
Review of Systems  Constitutional:  No fever, chills.  Eyes:  No visual changes, eye pain, or discharge.  ENMT:  No hearing changes, pain, or discharge. No nasal congestion, discharge, or bleeding. No throat pain, swelling, or difficulty swallowing.  Cardiac:  No chest pain, syncope, or edema. (+) palpitations  Respiratory:  No dyspnea, cough. No hemoptysis.  GI:  No nausea, vomiting, diarrhea, or abdominal pain.   :  No dysuria, hematuria, frequency, or burning.   MS:  No back pain.  Skin:  No skin rash, pruritis, jaundice, or lesions.  Neuro:  No headache, dizziness, loss of sensation, or focal weakness.  No change in mental status.

## 2021-12-13 NOTE — ED PROVIDER NOTE - ATTENDING CONTRIBUTION TO CARE
I personally evaluated the patient. I reviewed the Resident’s or Physician Assistant’s note (as assigned above), and agree with the findings and plan except as documented in my note.  Chart reviewed.  H/O tachycardia, presents with palpitations tonight.  No chest pain or SOB.  Has seen a cardiologist in past for same symptoms.  Exam shows alert patient in no distress, HEENT NCAT, lungs clear, tachycardic, S1S2, abdomen soft Nt +BS, no CCE.

## 2021-12-13 NOTE — ED PROVIDER NOTE - PATIENT PORTAL LINK FT
You can access the FollowMyHealth Patient Portal offered by [facility] by registering at the following website: [email or web address]. By joining [facility]’s FollowMyHealth portal, you will also be able to view your health information using other applications (apps) compatible with our system.

## 2021-12-14 ENCOUNTER — APPOINTMENT (OUTPATIENT)
Dept: CARDIOLOGY | Facility: CLINIC | Age: 31
End: 2021-12-14
Payer: MEDICAID

## 2021-12-14 VITALS
HEART RATE: 104 BPM | HEIGHT: 64 IN | BODY MASS INDEX: 42.68 KG/M2 | TEMPERATURE: 97.3 F | DIASTOLIC BLOOD PRESSURE: 71 MMHG | WEIGHT: 250 LBS | SYSTOLIC BLOOD PRESSURE: 115 MMHG

## 2021-12-14 DIAGNOSIS — R00.0 TACHYCARDIA, UNSPECIFIED: ICD-10-CM

## 2021-12-14 DIAGNOSIS — J45.909 UNSPECIFIED ASTHMA, UNCOMPLICATED: ICD-10-CM

## 2021-12-14 DIAGNOSIS — Z78.9 OTHER SPECIFIED HEALTH STATUS: ICD-10-CM

## 2021-12-14 PROCEDURE — 99204 OFFICE O/P NEW MOD 45 MIN: CPT

## 2021-12-14 PROCEDURE — 93000 ELECTROCARDIOGRAM COMPLETE: CPT

## 2021-12-14 RX ORDER — NORGESTIMATE AND ETHINYL ESTRADIOL 7DAYSX3 28
0.18/0.215/0.25 KIT ORAL DAILY
Qty: 28 | Refills: 11 | Status: COMPLETED | COMMUNITY
Start: 2019-06-20 | End: 2021-12-14

## 2021-12-14 NOTE — ASSESSMENT
[FreeTextEntry1] : 30 y/o female with palpitations\par \par Risk factors of DM, obesity\par ER w/u for PE, MI - negative\par \par Recommend:\par \par 2d echo to r/o structural heart disease.\par MCOT for 14 days to r/o arrhythmia.\par DM control\par Check HgA1c, TSH with the PMD. Labs form the ER noted.\par Hydration recommended\par Minimize albuterol.\par Avoid caffein, soda, etc.\par Patient was advised about healthy lifestyle changes, including diet and exercise. Importance of sustained long-term weight loss was discussed, questions answered.\par F/u after the tests.\par

## 2021-12-14 NOTE — HISTORY OF PRESENT ILLNESS
[FreeTextEntry1] : 32 y/o female with history of DM, DL, thyroid disease, obesity, asthma, presenting for evaluation of episodes of palpitations, tachycardia. Patient reports episodes of tachycardia to 130's. She was in ER recently, DD - negative, troponin - negative, was discharged and advised to f/u with cardiology. No chest pain.

## 2022-01-05 ENCOUNTER — NON-APPOINTMENT (OUTPATIENT)
Age: 32
End: 2022-01-05

## 2022-01-20 ENCOUNTER — APPOINTMENT (OUTPATIENT)
Dept: CARDIOLOGY | Facility: CLINIC | Age: 32
End: 2022-01-20
Payer: MEDICAID

## 2022-01-20 PROCEDURE — 93306 TTE W/DOPPLER COMPLETE: CPT

## 2022-01-25 ENCOUNTER — APPOINTMENT (OUTPATIENT)
Dept: CARDIOLOGY | Facility: CLINIC | Age: 32
End: 2022-01-25
Payer: MEDICAID

## 2022-01-25 VITALS
DIASTOLIC BLOOD PRESSURE: 84 MMHG | TEMPERATURE: 98.5 F | RESPIRATION RATE: 16 BRPM | HEIGHT: 64 IN | WEIGHT: 250 LBS | HEART RATE: 97 BPM | SYSTOLIC BLOOD PRESSURE: 139 MMHG | BODY MASS INDEX: 42.68 KG/M2

## 2022-01-25 DIAGNOSIS — R00.0 TACHYCARDIA, UNSPECIFIED: ICD-10-CM

## 2022-01-25 DIAGNOSIS — J45.909 UNSPECIFIED ASTHMA, UNCOMPLICATED: ICD-10-CM

## 2022-01-25 PROCEDURE — 99214 OFFICE O/P EST MOD 30 MIN: CPT

## 2022-01-25 PROCEDURE — 93000 ELECTROCARDIOGRAM COMPLETE: CPT

## 2022-01-25 RX ORDER — SEMAGLUTIDE 1.34 MG/ML
4 INJECTION, SOLUTION SUBCUTANEOUS WEEKLY
Refills: 0 | Status: ACTIVE | COMMUNITY

## 2022-01-25 RX ORDER — MULTIVITAMIN/IRON/FOLIC ACID 18MG-0.4MG
TABLET ORAL DAILY
Refills: 0 | Status: ACTIVE | COMMUNITY

## 2022-01-25 RX ORDER — MENTHOL/CAMPHOR 0.5 %-0.5%
1000 LOTION (ML) TOPICAL DAILY
Refills: 0 | Status: ACTIVE | COMMUNITY

## 2022-01-25 RX ORDER — FAMOTIDINE 40 MG/1
40 TABLET, FILM COATED ORAL
Qty: 30 | Refills: 0 | Status: ACTIVE | COMMUNITY
Start: 2021-08-10

## 2022-01-25 RX ORDER — COLD-HOT PACK
50 EACH MISCELLANEOUS DAILY
Refills: 0 | Status: ACTIVE | COMMUNITY

## 2022-01-25 RX ORDER — EMPAGLIFLOZIN AND METFORMIN HYDROCHLORIDE 12.5; 1 MG/1; MG/1
12.5-1 TABLET ORAL TWICE DAILY
Refills: 0 | Status: ACTIVE | COMMUNITY

## 2022-01-25 RX ORDER — PREDNISONE 20 MG/1
20 TABLET ORAL
Qty: 12 | Refills: 0 | Status: COMPLETED | COMMUNITY
Start: 2021-11-10 | End: 2022-01-25

## 2022-01-25 NOTE — HISTORY OF PRESENT ILLNESS
[FreeTextEntry1] : 30 y/o female with history of DM, DL, thyroid disease, obesity, asthma, presenting for f/u of palpitations, tachycardia. Patient reports episodes of tachycardia to 130's. She was in ER recently, DD - negative, troponin - negative, was discharged. No chest pain. echo - normal function. MCOT - negative for arrhythmia.\par  today. HgA1c 8.0.

## 2022-01-25 NOTE — ASSESSMENT
[FreeTextEntry1] : 32 y/o female with palpitations\par \par Risk factors of DM, obesity\par ER w/u for PE, MI - negative\par \par Recommend:\par \par 2d echo negative for structural heart disease.\par MCOT negative for arrhythmia. SR \par \par DM control\par Check HgA1c, TSH - Rx given. Needs better glycemic control. Has + glucose in urine, suggestine uncontrolled glucose, was recently seen by endocrine and regimen was adjusted.\par Hydration recommended\par Minimize albuterol.\par Avoid caffein, soda, etc.\par Patient was advised about healthy lifestyle changes, including diet and exercise. Importance of sustained long-term weight loss was discussed, questions answered.\par If persistent symptoms - refer to EP for ILR.\par May consider low dose Cardizem if needed, but I feel she can control her HR with hydration, weight loss and exercise. Discussed in detail.\par F/u in 3 months\par

## 2022-04-26 ENCOUNTER — APPOINTMENT (OUTPATIENT)
Dept: CARDIOLOGY | Facility: CLINIC | Age: 32
End: 2022-04-26

## 2022-09-23 NOTE — ED ADULT TRIAGE NOTE - NS ED NOTE AC HIGH RISK COUNTRIES
[email or web address]/  Long Term No

## 2022-10-21 ENCOUNTER — EMERGENCY (EMERGENCY)
Facility: HOSPITAL | Age: 32
LOS: 0 days | Discharge: HOME | End: 2022-10-21
Attending: EMERGENCY MEDICINE | Admitting: EMERGENCY MEDICINE

## 2022-10-21 VITALS
RESPIRATION RATE: 22 BRPM | DIASTOLIC BLOOD PRESSURE: 58 MMHG | HEIGHT: 64 IN | HEART RATE: 118 BPM | WEIGHT: 250 LBS | OXYGEN SATURATION: 98 % | TEMPERATURE: 101 F | SYSTOLIC BLOOD PRESSURE: 131 MMHG

## 2022-10-21 VITALS
TEMPERATURE: 100 F | HEART RATE: 98 BPM | RESPIRATION RATE: 20 BRPM | OXYGEN SATURATION: 98 % | SYSTOLIC BLOOD PRESSURE: 113 MMHG | DIASTOLIC BLOOD PRESSURE: 63 MMHG

## 2022-10-21 DIAGNOSIS — R05.1 ACUTE COUGH: ICD-10-CM

## 2022-10-21 DIAGNOSIS — Z79.85 LONG-TERM (CURRENT) USE OF INJECTABLE NON-INSULIN ANTIDIABETIC DRUGS: ICD-10-CM

## 2022-10-21 DIAGNOSIS — Z20.822 CONTACT WITH AND (SUSPECTED) EXPOSURE TO COVID-19: ICD-10-CM

## 2022-10-21 DIAGNOSIS — R50.9 FEVER, UNSPECIFIED: ICD-10-CM

## 2022-10-21 DIAGNOSIS — R06.02 SHORTNESS OF BREATH: ICD-10-CM

## 2022-10-21 DIAGNOSIS — Z79.84 LONG TERM (CURRENT) USE OF ORAL HYPOGLYCEMIC DRUGS: ICD-10-CM

## 2022-10-21 DIAGNOSIS — J45.909 UNSPECIFIED ASTHMA, UNCOMPLICATED: ICD-10-CM

## 2022-10-21 DIAGNOSIS — E11.9 TYPE 2 DIABETES MELLITUS WITHOUT COMPLICATIONS: ICD-10-CM

## 2022-10-21 DIAGNOSIS — R09.81 NASAL CONGESTION: ICD-10-CM

## 2022-10-21 DIAGNOSIS — Z86.2 PERSONAL HISTORY OF DISEASES OF THE BLOOD AND BLOOD-FORMING ORGANS AND CERTAIN DISORDERS INVOLVING THE IMMUNE MECHANISM: ICD-10-CM

## 2022-10-21 DIAGNOSIS — B97.4 RESPIRATORY SYNCYTIAL VIRUS AS THE CAUSE OF DISEASES CLASSIFIED ELSEWHERE: ICD-10-CM

## 2022-10-21 LAB
ALBUMIN SERPL ELPH-MCNC: 4.3 G/DL — SIGNIFICANT CHANGE UP (ref 3.5–5.2)
ALP SERPL-CCNC: 92 U/L — SIGNIFICANT CHANGE UP (ref 30–115)
ALT FLD-CCNC: 21 U/L — SIGNIFICANT CHANGE UP (ref 0–41)
ANION GAP SERPL CALC-SCNC: 12 MMOL/L — SIGNIFICANT CHANGE UP (ref 7–14)
AST SERPL-CCNC: 14 U/L — SIGNIFICANT CHANGE UP (ref 0–41)
BASOPHILS # BLD AUTO: 0.06 K/UL — SIGNIFICANT CHANGE UP (ref 0–0.2)
BASOPHILS NFR BLD AUTO: 0.6 % — SIGNIFICANT CHANGE UP (ref 0–1)
BILIRUB SERPL-MCNC: 0.3 MG/DL — SIGNIFICANT CHANGE UP (ref 0.2–1.2)
BUN SERPL-MCNC: 14 MG/DL — SIGNIFICANT CHANGE UP (ref 10–20)
CALCIUM SERPL-MCNC: 9.3 MG/DL — SIGNIFICANT CHANGE UP (ref 8.4–10.5)
CHLORIDE SERPL-SCNC: 99 MMOL/L — SIGNIFICANT CHANGE UP (ref 98–110)
CO2 SERPL-SCNC: 23 MMOL/L — SIGNIFICANT CHANGE UP (ref 17–32)
CREAT SERPL-MCNC: 0.6 MG/DL — LOW (ref 0.7–1.5)
EGFR: 121 ML/MIN/1.73M2 — SIGNIFICANT CHANGE UP
EOSINOPHIL # BLD AUTO: 0.14 K/UL — SIGNIFICANT CHANGE UP (ref 0–0.7)
EOSINOPHIL NFR BLD AUTO: 1.3 % — SIGNIFICANT CHANGE UP (ref 0–8)
FLUAV AG NPH QL: SIGNIFICANT CHANGE UP
FLUBV AG NPH QL: SIGNIFICANT CHANGE UP
GLUCOSE SERPL-MCNC: 167 MG/DL — HIGH (ref 70–99)
HCT VFR BLD CALC: 44.5 % — SIGNIFICANT CHANGE UP (ref 37–47)
HGB BLD-MCNC: 14.1 G/DL — SIGNIFICANT CHANGE UP (ref 12–16)
IMM GRANULOCYTES NFR BLD AUTO: 0.4 % — HIGH (ref 0.1–0.3)
LYMPHOCYTES # BLD AUTO: 1.27 K/UL — SIGNIFICANT CHANGE UP (ref 1.2–3.4)
LYMPHOCYTES # BLD AUTO: 11.8 % — LOW (ref 20.5–51.1)
MCHC RBC-ENTMCNC: 25 PG — LOW (ref 27–31)
MCHC RBC-ENTMCNC: 31.7 G/DL — LOW (ref 32–37)
MCV RBC AUTO: 78.9 FL — LOW (ref 81–99)
MONOCYTES # BLD AUTO: 0.8 K/UL — HIGH (ref 0.1–0.6)
MONOCYTES NFR BLD AUTO: 7.4 % — SIGNIFICANT CHANGE UP (ref 1.7–9.3)
NEUTROPHILS # BLD AUTO: 8.43 K/UL — HIGH (ref 1.4–6.5)
NEUTROPHILS NFR BLD AUTO: 78.5 % — HIGH (ref 42.2–75.2)
NRBC # BLD: 0 /100 WBCS — SIGNIFICANT CHANGE UP (ref 0–0)
PLATELET # BLD AUTO: 262 K/UL — SIGNIFICANT CHANGE UP (ref 130–400)
POTASSIUM SERPL-MCNC: 4.3 MMOL/L — SIGNIFICANT CHANGE UP (ref 3.5–5)
POTASSIUM SERPL-SCNC: 4.3 MMOL/L — SIGNIFICANT CHANGE UP (ref 3.5–5)
PROT SERPL-MCNC: 6.9 G/DL — SIGNIFICANT CHANGE UP (ref 6–8)
RBC # BLD: 5.64 M/UL — HIGH (ref 4.2–5.4)
RBC # FLD: 14.6 % — HIGH (ref 11.5–14.5)
RSV RNA NPH QL NAA+NON-PROBE: DETECTED
SARS-COV-2 RNA SPEC QL NAA+PROBE: SIGNIFICANT CHANGE UP
SODIUM SERPL-SCNC: 134 MMOL/L — LOW (ref 135–146)
WBC # BLD: 10.74 K/UL — SIGNIFICANT CHANGE UP (ref 4.8–10.8)
WBC # FLD AUTO: 10.74 K/UL — SIGNIFICANT CHANGE UP (ref 4.8–10.8)

## 2022-10-21 PROCEDURE — 93010 ELECTROCARDIOGRAM REPORT: CPT

## 2022-10-21 PROCEDURE — 99284 EMERGENCY DEPT VISIT MOD MDM: CPT

## 2022-10-21 PROCEDURE — 71046 X-RAY EXAM CHEST 2 VIEWS: CPT | Mod: 26

## 2022-10-21 RX ORDER — IPRATROPIUM/ALBUTEROL SULFATE 18-103MCG
3 AEROSOL WITH ADAPTER (GRAM) INHALATION ONCE
Refills: 0 | Status: COMPLETED | OUTPATIENT
Start: 2022-10-21 | End: 2022-10-21

## 2022-10-21 RX ORDER — ACETAMINOPHEN 500 MG
650 TABLET ORAL ONCE
Refills: 0 | Status: COMPLETED | OUTPATIENT
Start: 2022-10-21 | End: 2022-10-21

## 2022-10-21 RX ORDER — SODIUM CHLORIDE 9 MG/ML
1000 INJECTION INTRAMUSCULAR; INTRAVENOUS; SUBCUTANEOUS ONCE
Refills: 0 | Status: COMPLETED | OUTPATIENT
Start: 2022-10-21 | End: 2022-10-21

## 2022-10-21 RX ADMIN — Medication 650 MILLIGRAM(S): at 11:23

## 2022-10-21 RX ADMIN — Medication 650 MILLIGRAM(S): at 09:27

## 2022-10-21 RX ADMIN — Medication 3 MILLILITER(S): at 09:30

## 2022-10-21 RX ADMIN — Medication 40 MILLIGRAM(S): at 09:30

## 2022-10-21 RX ADMIN — SODIUM CHLORIDE 1000 MILLILITER(S): 9 INJECTION INTRAMUSCULAR; INTRAVENOUS; SUBCUTANEOUS at 11:23

## 2022-10-21 RX ADMIN — Medication 3 MILLILITER(S): at 10:51

## 2022-10-21 RX ADMIN — SODIUM CHLORIDE 1000 MILLILITER(S): 9 INJECTION INTRAMUSCULAR; INTRAVENOUS; SUBCUTANEOUS at 09:32

## 2022-10-21 NOTE — ED PROVIDER NOTE - NS ED ATTENDING STATEMENT MOD
This was a shared visit with the DAMARIS. I reviewed and verified the documentation and independently performed the documented:

## 2022-10-21 NOTE — ED PROVIDER NOTE - OBJECTIVE STATEMENT
32-year-old female history of asthma, diabetes complaining of cough, shortness of breath, runny nose, chest congestion, and fever up to 100.5 x 2 days.  Patient states she went to urgent care 2 days ago and was diagnosed with URI and given Z-Brennon which she has been taking without improvement.  No chest pain, abdominal pain, leg pains or leg swelling. No OCP use. LMP now.

## 2022-10-21 NOTE — ED PROVIDER NOTE - PHYSICAL EXAMINATION
Gen: Alert, NAD, well appearing  Head: NC, AT, PERRL, EOMI, normal lids/conjunctiva  ENT: normal hearing, patent oropharynx  Neck: +supple, no tenderness/meningismus,  Pulm: Bilateral BS, normal resp effort, +scant wheeze  Abd: soft, NT/ND  Mskel: no edema/erythema/cyanosis. No calf swelling or tenderness  Skin: no rash, warm/dry  Neuro: AAOx3, no sensory/motor deficits

## 2022-10-21 NOTE — ED PROVIDER NOTE - ATTENDING APP SHARED VISIT CONTRIBUTION OF CARE
Detail Level: Zone Hide Additional Notes?: No Patient complains of cough, mild shortness of breath, fever, rhinorrhea.  Vital signs noted.  NAD.  Chest clear.  Diagnostic testing reviewed.  Note WBC count.  Note positive RSV PCR.  RSV is the likely etiology of this patient's symptoms.  Empiric treatment ordered.  In my opinion, outpatient follow-up and treatment is medically appropriate.

## 2022-10-21 NOTE — ED PROVIDER NOTE - NS ED ROS FT
Review of Systems    Constitutional: (+) fever, (-) chills  Eyes/ENT: (-) blurry vision, (-) epistaxis, (-) sore throat, (+)stuffy/runny nose  Cardiovascular: (-) chest pain, (-) syncope  Respiratory: (+) cough, (+) shortness of breath  Gastrointestinal: (-) pain, (-) nausea, (-) vomiting, (-) diarrhea  Musculoskeletal: (-) neck pain, (-) back pain, (-) body aches  Integumentary: (-) rash, (-) edema  Neurological: (-) headache, (-) altered mental status

## 2022-10-21 NOTE — ED PROVIDER NOTE - PATIENT PORTAL LINK FT
You can access the FollowMyHealth Patient Portal offered by [facility] by registering at the following website: [email or web address]. By joining [employer]’s FollowMyHealth portal, you will also be able to view your health information using other applications (apps) compatible with our system.

## 2022-10-21 NOTE — ED ADULT NURSE NOTE - NSIMPLEMENTINTERV_GEN_ALL_ED
Implemented All Universal Safety Interventions:  [place] to call system. Call bell, personal items and telephone within reach. Instruct patient to call for assistance. Room bathroom lighting operational. Non-slip footwear when patient is off stretcher. Physically safe environment: no spills, clutter or unnecessary equipment. Stretcher in lowest position, wheels locked, appropriate side rails in place.

## 2022-10-21 NOTE — ED PROVIDER NOTE - CARE PROVIDER_API CALL
Brandon Dalal ()  Internal Medicine  [address] [place] [address]  Phone: [phone]  Fax: [phone]  Follow Up Time: 1-3 Days

## 2023-02-09 ENCOUNTER — APPOINTMENT (OUTPATIENT)
Dept: OBGYN | Facility: CLINIC | Age: 33
End: 2023-02-09
Payer: MEDICAID

## 2023-02-09 DIAGNOSIS — Z01.419 ENCOUNTER FOR GYNECOLOGICAL EXAMINATION (GENERAL) (ROUTINE) W/OUT ABNORMAL FINDINGS: ICD-10-CM

## 2023-02-09 PROCEDURE — 99213 OFFICE O/P EST LOW 20 MIN: CPT | Mod: 25

## 2023-02-09 PROCEDURE — 99395 PREV VISIT EST AGE 18-39: CPT

## 2023-02-09 RX ORDER — ESCITALOPRAM OXALATE 10 MG/1
10 TABLET ORAL
Qty: 30 | Refills: 0 | Status: DISCONTINUED | COMMUNITY
Start: 2021-08-19 | End: 2023-02-09

## 2023-02-10 NOTE — PHYSICAL EXAM
[Appropriately responsive] : appropriately responsive [Alert] : alert [No Acute Distress] : no acute distress [Soft] : soft [Non-tender] : non-tender [Non-distended] : non-distended [No HSM] : No HSM [No Lesions] : no lesions [No Mass] : no mass [Oriented x3] : oriented x3 [Examination Of The Breasts] : a normal appearance [No Masses] : no breast masses were palpable [Labia Majora] : normal [Labia Minora] : normal [Ulcer ___ mm] : [unfilled] ~Umm ulcer [Normal] : normal [Uterine Adnexae] : normal [FreeTextEntry1] : multiple 1cm shallow painful ulcer in both labia majora.

## 2023-02-10 NOTE — HISTORY OF PRESENT ILLNESS
[FreeTextEntry1] : 33 y/o  LMP 1/27/2023, here today for annual exam. Pt c/o burning with urination, vaginal dryness, and labial swelling with "bumps".  Pt with a known history of diabetes and asthma. Denies abnormal vaginal discharge and pelvic pain. \par \par She also reports that she has been trying to get pregnant for the past 5 - 6 months without success.\par \par Medications reconciled with patient. \par \par Last Annual: 12/19/2019\par Last PAP: 12/19/2019\par \par

## 2023-02-10 NOTE — COUNSELING
[Nutrition/ Exercise/ Weight Management] : nutrition, exercise, weight management [Breast Self Exam] : breast self exam [Pregnancy Options] : pregnancy options [STD (testing, results, tx)] : STD (testing, results, tx)

## 2023-02-10 NOTE — PLAN
[FreeTextEntry1] : Patient counselled that the lesions may be herpetic in nature.\par She was also counselled about her infertility.\par \par Labs ordered\par HSV culture as well as HSV serology\par Pap/hpv done\par \par She was instructed to check her ovulation.\par \par Will discuss results and further mgmt via telehealth.

## 2023-02-12 LAB
HSV+VZV DNA SPEC QL NAA+PROBE: ABNORMAL
SPECIMEN SOURCE: NORMAL

## 2023-02-15 LAB
A VAGINAE DNA VAG QL NAA+PROBE: NORMAL
BVAB2 DNA VAG QL NAA+PROBE: NORMAL
C KRUSEI DNA VAG QL NAA+PROBE: NEGATIVE
C TRACH RRNA SPEC QL NAA+PROBE: NEGATIVE
CYTOLOGY CVX/VAG DOC THIN PREP: NORMAL
HPV HIGH+LOW RISK DNA PNL CVX: NOT DETECTED
MEGA1 DNA VAG QL NAA+PROBE: NORMAL
N GONORRHOEA RRNA SPEC QL NAA+PROBE: NEGATIVE
T VAGINALIS RRNA SPEC QL NAA+PROBE: NEGATIVE

## 2023-02-25 LAB — PROGEST SERPL-MCNC: 3.9 NG/ML

## 2023-03-04 LAB
ALBUMIN SERPL ELPH-MCNC: 4.9 G/DL
ALP BLD-CCNC: 129 U/L
ALT SERPL-CCNC: 22 U/L
ANION GAP SERPL CALC-SCNC: 14 MMOL/L
AST SERPL-CCNC: 14 U/L
BASOPHILS # BLD AUTO: 0.07 K/UL
BASOPHILS NFR BLD AUTO: 0.7 %
BILIRUB SERPL-MCNC: <0.2 MG/DL
BUN SERPL-MCNC: 17 MG/DL
CALCIUM SERPL-MCNC: 10.2 MG/DL
CHLORIDE SERPL-SCNC: 99 MMOL/L
CO2 SERPL-SCNC: 26 MMOL/L
CREAT SERPL-MCNC: 0.6 MG/DL
EGFR: 121 ML/MIN/1.73M2
EOSINOPHIL # BLD AUTO: 0.21 K/UL
EOSINOPHIL NFR BLD AUTO: 2.1 %
ESTRADIOL SERPL-MCNC: 21 PG/ML
FSH SERPL-MCNC: 7.8 IU/L
GLUCOSE SERPL-MCNC: 228 MG/DL
HBV SURFACE AB SER QL: REACTIVE
HBV SURFACE AG SER QL: NONREACTIVE
HCT VFR BLD CALC: 47.1 %
HCV AB SER QL: NONREACTIVE
HCV S/CO RATIO: 0.13 S/CO
HGB BLD-MCNC: 14.5 G/DL
HIV1+2 AB SPEC QL IA.RAPID: NONREACTIVE
HSV 1+2 IGG SER IA-IMP: POSITIVE
HSV 1+2 IGG SER IA-IMP: POSITIVE
HSV1 IGG SER QL: 15.1 INDEX
HSV1 IGM SER QL: NEGATIVE
HSV2 AB FLD-ACNC: NEGATIVE
HSV2 IGG SER QL: 3.12 INDEX
IMM GRANULOCYTES NFR BLD AUTO: 0.5 %
LYMPHOCYTES # BLD AUTO: 2.62 K/UL
LYMPHOCYTES NFR BLD AUTO: 25.9 %
MAN DIFF?: NORMAL
MCHC RBC-ENTMCNC: 24.9 PG
MCHC RBC-ENTMCNC: 30.8 G/DL
MCV RBC AUTO: 80.8 FL
MONOCYTES # BLD AUTO: 0.64 K/UL
MONOCYTES NFR BLD AUTO: 6.3 %
NEUTROPHILS # BLD AUTO: 6.51 K/UL
NEUTROPHILS NFR BLD AUTO: 64.5 %
PLATELET # BLD AUTO: 331 K/UL
POTASSIUM SERPL-SCNC: 3.9 MMOL/L
PROLACTIN SERPL-MCNC: 8 NG/ML
PROT SERPL-MCNC: 7.6 G/DL
RBC # BLD: 5.83 M/UL
RBC # FLD: 15.6 %
SODIUM SERPL-SCNC: 139 MMOL/L
T PALLIDUM AB SER QL IA: NEGATIVE
T4 FREE SERPL-MCNC: 1.2 NG/DL
TSH SERPL-ACNC: 2.42 UIU/ML
WBC # FLD AUTO: 10.1 K/UL

## 2023-03-08 LAB
TESTOST FREE SERPL-MCNC: 0.9 PG/ML
TESTOST SERPL-MCNC: 41.9 NG/DL

## 2023-03-12 LAB — ANTI-MUELLERIAN HORMONE: 2.33 NG/ML

## 2023-03-30 ENCOUNTER — APPOINTMENT (OUTPATIENT)
Dept: OBGYN | Facility: CLINIC | Age: 33
End: 2023-03-30
Payer: MEDICAID

## 2023-03-30 VITALS — DIASTOLIC BLOOD PRESSURE: 70 MMHG | SYSTOLIC BLOOD PRESSURE: 112 MMHG

## 2023-03-30 PROCEDURE — 99214 OFFICE O/P EST MOD 30 MIN: CPT

## 2023-03-30 RX ORDER — CLOMIPHENE CITRATE 50 MG/1
50 TABLET ORAL DAILY
Qty: 5 | Refills: 3 | Status: ACTIVE | COMMUNITY
Start: 2023-03-30 | End: 1900-01-01

## 2023-03-30 NOTE — HISTORY OF PRESENT ILLNESS
[FreeTextEntry1] : Patient here for f/u\par SHe reports that the "bump" resolved after being treated with valtrex.\par She would also like to discuss her testing results and her inability to get pregnant\par She reports that her last period was 3/29.\par Period before last - 2/25-3/1\par Pre before that - 1/27\par Her day 3 labs (2/27) were normal (except for HSV). FSH = 7.8. E2= 21. AMH = 2.33\par Her day 21 progesterone (2/17) - 3.9\par She performed an ovulation prediction kit  2/8-2/23 (Day 12-16) which showed no ovulation.\par \par

## 2023-03-30 NOTE — PLAN
[FreeTextEntry1] : I counselled patient at length about HSV. I prescribed more valtrex in the event she has another outbreak. I also recommended suppresion therapy if she gets more than 3 outbreaks per year.\par I also counselled her at length that her sugar was quite elevated and that It is recommended that she get her sugars under control - especially if she wants to get pregnant. She needs to see her primary doc and/or endocrinology.\par I also counselled her that it appears she is anovulatory. I recommended that she get her sugards under control first before trying to get pregnant but she would like to try now. I recommended clomid 50mg day 3-7. OPK day 12-16. Sex every other day. Day 21 progesterone.\par f/u 4-5 weeks.\par

## 2023-04-06 ENCOUNTER — APPOINTMENT (OUTPATIENT)
Dept: OBGYN | Facility: CLINIC | Age: 33
End: 2023-04-06
Payer: MEDICAID

## 2023-04-06 PROCEDURE — 99213 OFFICE O/P EST LOW 20 MIN: CPT | Mod: 95

## 2023-04-06 NOTE — HISTORY OF PRESENT ILLNESS
[Home] : at home, [unfilled] , at the time of the visit. [Medical Office: ([facility])___] : at the medical office located in  [FreeTextEntry1] : Patient cannot get clomid as it is on backorder.\par

## 2023-04-06 NOTE — PLAN
[FreeTextEntry1] : Patient counselled that we can also use letrozole for ovulation induction. It appears that letrazole is actually superior to clomid (27.5% vs 19.1% - cumulative live birth rate).\par \par Instructions given.\par \par will start letrazole cycle after her next period, Her LMP was last week.\par \par

## 2023-05-25 LAB
ESTIMATED AVERAGE GLUCOSE: 232 MG/DL
HBA1C MFR BLD HPLC: 9.7 %
PROGEST SERPL-MCNC: 7.1 NG/ML

## 2023-06-01 ENCOUNTER — APPOINTMENT (OUTPATIENT)
Dept: OBGYN | Facility: CLINIC | Age: 33
End: 2023-06-01
Payer: MEDICAID

## 2023-06-01 PROCEDURE — 99214 OFFICE O/P EST MOD 30 MIN: CPT

## 2023-06-01 NOTE — HISTORY OF PRESENT ILLNESS
[FreeTextEntry1] : patient here for f/u of infertility\par LMP 5/2\par She took letrozole May 4-8.\par OPK was positive on May 17th\par Day 21 Progesterone on May 25th was 7.1\par Today upreg is negative.\par \par Her A1C is quite elevated 9.7. Used to be 7.8.\par \par She is currently on syngardi and ozempic. She hasn't seen her endocrinologist in a year.\par \par Patient told to check her upreg again next week. \par \par Try another cycle of letrozole.\par \par f/u 5 weeks.

## 2023-07-06 ENCOUNTER — APPOINTMENT (OUTPATIENT)
Dept: OBGYN | Facility: CLINIC | Age: 33
End: 2023-07-06

## 2023-07-11 ENCOUNTER — EMERGENCY (EMERGENCY)
Facility: HOSPITAL | Age: 33
LOS: 0 days | Discharge: HOME | End: 2023-07-11
Attending: EMERGENCY MEDICINE
Payer: MEDICAID

## 2023-07-11 VITALS
RESPIRATION RATE: 20 BRPM | WEIGHT: 218.04 LBS | TEMPERATURE: 98 F | OXYGEN SATURATION: 100 % | DIASTOLIC BLOOD PRESSURE: 60 MMHG | SYSTOLIC BLOOD PRESSURE: 126 MMHG | HEIGHT: 63 IN | HEART RATE: 101 BPM

## 2023-07-11 VITALS
TEMPERATURE: 98 F | OXYGEN SATURATION: 99 % | RESPIRATION RATE: 18 BRPM | HEART RATE: 86 BPM | DIASTOLIC BLOOD PRESSURE: 66 MMHG | SYSTOLIC BLOOD PRESSURE: 126 MMHG

## 2023-07-11 LAB
ALBUMIN SERPL ELPH-MCNC: 4.6 G/DL — SIGNIFICANT CHANGE UP (ref 3.5–5.2)
ALP SERPL-CCNC: 87 U/L — SIGNIFICANT CHANGE UP (ref 30–115)
ALT FLD-CCNC: 15 U/L — SIGNIFICANT CHANGE UP (ref 0–41)
ANION GAP SERPL CALC-SCNC: 14 MMOL/L — SIGNIFICANT CHANGE UP (ref 7–14)
APPEARANCE UR: CLEAR — SIGNIFICANT CHANGE UP
AST SERPL-CCNC: 14 U/L — SIGNIFICANT CHANGE UP (ref 0–41)
BACTERIA # UR AUTO: ABNORMAL /HPF
BASOPHILS # BLD AUTO: 0.05 K/UL — SIGNIFICANT CHANGE UP (ref 0–0.2)
BASOPHILS NFR BLD AUTO: 0.4 % — SIGNIFICANT CHANGE UP (ref 0–1)
BILIRUB DIRECT SERPL-MCNC: <0.2 MG/DL — SIGNIFICANT CHANGE UP (ref 0–0.3)
BILIRUB INDIRECT FLD-MCNC: SIGNIFICANT CHANGE UP MG/DL (ref 0.2–1.2)
BILIRUB SERPL-MCNC: <0.2 MG/DL — SIGNIFICANT CHANGE UP (ref 0.2–1.2)
BILIRUB UR-MCNC: ABNORMAL
BLD GP AB SCN SERPL QL: SIGNIFICANT CHANGE UP
BUN SERPL-MCNC: 14 MG/DL — SIGNIFICANT CHANGE UP (ref 10–20)
CALCIUM SERPL-MCNC: 9.1 MG/DL — SIGNIFICANT CHANGE UP (ref 8.4–10.5)
CHLORIDE SERPL-SCNC: 102 MMOL/L — SIGNIFICANT CHANGE UP (ref 98–110)
CO2 SERPL-SCNC: 23 MMOL/L — SIGNIFICANT CHANGE UP (ref 17–32)
COLOR SPEC: YELLOW — SIGNIFICANT CHANGE UP
CREAT SERPL-MCNC: 0.6 MG/DL — LOW (ref 0.7–1.5)
DIFF PNL FLD: ABNORMAL
EGFR: 121 ML/MIN/1.73M2 — SIGNIFICANT CHANGE UP
EOSINOPHIL # BLD AUTO: 0.18 K/UL — SIGNIFICANT CHANGE UP (ref 0–0.7)
EOSINOPHIL NFR BLD AUTO: 1.6 % — SIGNIFICANT CHANGE UP (ref 0–8)
EPI CELLS # UR: ABNORMAL /HPF (ref 0–5)
GLUCOSE SERPL-MCNC: 140 MG/DL — HIGH (ref 70–99)
GLUCOSE UR QL: >=1000 MG/DL
HCG SERPL-ACNC: 74.2 MIU/ML — HIGH
HCT VFR BLD CALC: 45.1 % — SIGNIFICANT CHANGE UP (ref 37–47)
HGB BLD-MCNC: 14.2 G/DL — SIGNIFICANT CHANGE UP (ref 12–16)
IMM GRANULOCYTES NFR BLD AUTO: 0.4 % — HIGH (ref 0.1–0.3)
KETONES UR-MCNC: 160
LEUKOCYTE ESTERASE UR-ACNC: NEGATIVE — SIGNIFICANT CHANGE UP
LIDOCAIN IGE QN: 53 U/L — SIGNIFICANT CHANGE UP (ref 7–60)
LYMPHOCYTES # BLD AUTO: 2.84 K/UL — SIGNIFICANT CHANGE UP (ref 1.2–3.4)
LYMPHOCYTES # BLD AUTO: 25.4 % — SIGNIFICANT CHANGE UP (ref 20.5–51.1)
MCHC RBC-ENTMCNC: 25.1 PG — LOW (ref 27–31)
MCHC RBC-ENTMCNC: 31.5 G/DL — LOW (ref 32–37)
MCV RBC AUTO: 79.8 FL — LOW (ref 81–99)
MONOCYTES # BLD AUTO: 0.64 K/UL — HIGH (ref 0.1–0.6)
MONOCYTES NFR BLD AUTO: 5.7 % — SIGNIFICANT CHANGE UP (ref 1.7–9.3)
NEUTROPHILS # BLD AUTO: 7.45 K/UL — HIGH (ref 1.4–6.5)
NEUTROPHILS NFR BLD AUTO: 66.5 % — SIGNIFICANT CHANGE UP (ref 42.2–75.2)
NITRITE UR-MCNC: NEGATIVE — SIGNIFICANT CHANGE UP
NRBC # BLD: 0 /100 WBCS — SIGNIFICANT CHANGE UP (ref 0–0)
PH UR: 5 — SIGNIFICANT CHANGE UP (ref 5–8)
PLATELET # BLD AUTO: 340 K/UL — SIGNIFICANT CHANGE UP (ref 130–400)
PMV BLD: 9 FL — SIGNIFICANT CHANGE UP (ref 7.4–10.4)
POTASSIUM SERPL-MCNC: 3.9 MMOL/L — SIGNIFICANT CHANGE UP (ref 3.5–5)
POTASSIUM SERPL-SCNC: 3.9 MMOL/L — SIGNIFICANT CHANGE UP (ref 3.5–5)
PROT SERPL-MCNC: 7.5 G/DL — SIGNIFICANT CHANGE UP (ref 6–8)
PROT UR-MCNC: ABNORMAL MG/DL
RBC # BLD: 5.65 M/UL — HIGH (ref 4.2–5.4)
RBC # FLD: 14.8 % — HIGH (ref 11.5–14.5)
RBC CASTS # UR COMP ASSIST: ABNORMAL /HPF (ref 0–4)
SODIUM SERPL-SCNC: 139 MMOL/L — SIGNIFICANT CHANGE UP (ref 135–146)
SP GR SPEC: >=1.03 (ref 1.01–1.03)
UROBILINOGEN FLD QL: 0.2 MG/DL — SIGNIFICANT CHANGE UP
WBC # BLD: 11.2 K/UL — HIGH (ref 4.8–10.8)
WBC # FLD AUTO: 11.2 K/UL — HIGH (ref 4.8–10.8)
WBC UR QL: SIGNIFICANT CHANGE UP /HPF (ref 0–5)

## 2023-07-11 PROCEDURE — 76830 TRANSVAGINAL US NON-OB: CPT

## 2023-07-11 PROCEDURE — 99284 EMERGENCY DEPT VISIT MOD MDM: CPT

## 2023-07-11 PROCEDURE — 99282 EMERGENCY DEPT VISIT SF MDM: CPT

## 2023-07-11 PROCEDURE — 90384 RH IG FULL-DOSE IM: CPT

## 2023-07-11 PROCEDURE — 76830 TRANSVAGINAL US NON-OB: CPT | Mod: 26

## 2023-07-11 PROCEDURE — 99284 EMERGENCY DEPT VISIT MOD MDM: CPT | Mod: 25

## 2023-07-11 PROCEDURE — 96372 THER/PROPH/DIAG INJ SC/IM: CPT

## 2023-07-11 RX ORDER — ACETAMINOPHEN 500 MG
975 TABLET ORAL ONCE
Refills: 0 | Status: COMPLETED | OUTPATIENT
Start: 2023-07-11 | End: 2023-07-11

## 2023-07-11 RX ORDER — SODIUM CHLORIDE 9 MG/ML
1000 INJECTION INTRAMUSCULAR; INTRAVENOUS; SUBCUTANEOUS ONCE
Refills: 0 | Status: COMPLETED | OUTPATIENT
Start: 2023-07-11 | End: 2023-07-11

## 2023-07-11 RX ADMIN — SODIUM CHLORIDE 1000 MILLILITER(S): 9 INJECTION INTRAMUSCULAR; INTRAVENOUS; SUBCUTANEOUS at 01:36

## 2023-07-11 RX ADMIN — Medication 975 MILLIGRAM(S): at 09:05

## 2023-07-11 NOTE — ED PROVIDER NOTE - NSFOLLOWUPINSTRUCTIONS_ED_ALL_ED_FT
You must follow up for repeat blood work on July 13 as scheduled.    If you develop worsening bleeding, pain or any other concerning conditions, please return to the ER immediately.    Threatened Miscarriage    A threatened miscarriage occurs when you have vaginal bleeding during your first 20 weeks of pregnancy but the pregnancy has not ended. If you have vaginal bleeding during this time, your health care provider will do tests to make sure you are still pregnant. If the tests show you are still pregnant and the developing baby (fetus) inside your womb (uterus) is still growing, your condition is considered a threatened miscarriage.    A threatened miscarriage does not mean your pregnancy will end, but it does increase the risk of losing your pregnancy (complete miscarriage).    CAUSES  The cause of a threatened miscarriage is usually not known. If you go on to have a complete miscarriage, the most common cause is an abnormal number of chromosomes in the developing baby. Chromosomes are the structures inside cells that hold all your genetic material.    Some causes of vaginal bleeding that do not result in miscarriage include:    Having sex.  Having an infection.  Normal hormone changes of pregnancy.  Bleeding that occurs when an egg implants in your uterus.    RISK FACTORS  Risk factors for bleeding in early pregnancy include:    Obesity.  Smoking.  Drinking excessive amounts of alcohol or caffeine.  Recreational drug use.    SIGNS AND SYMPTOMS  Light vaginal bleeding.   Mild abdominal pain or cramps.     DIAGNOSIS  If you have bleeding with or without abdominal pain before 20 weeks of pregnancy, your health care provider will do tests to check whether you are still pregnant. One important test involves using sound waves and a computer (ultrasound) to create images of the inside of your uterus. Other tests include an internal exam of your vagina and uterus (pelvic exam) and measurement of your baby's heart rate.     You may be diagnosed with a threatened miscarriage if:    Ultrasound testing shows you are still pregnant.  Your baby's heart rate is strong.  A pelvic exam shows that the opening between your uterus and your vagina (cervix) is closed.  Your heart rate and blood pressure are stable.  Blood tests confirm you are still pregnant.    TREATMENT  No treatments have been shown to prevent a threatened miscarriage from going on to a complete miscarriage. However, the right home care is important.     HOME CARE INSTRUCTIONS  Make sure you keep all your appointments for prenatal care. This is very important.  Get plenty of rest.  Do not have sex or use tampons if you have vaginal bleeding.  Do not douche.  Do not smoke or use recreational drugs.   Do not drink alcohol.   Avoid caffeine.     SEEK MEDICAL CARE IF:  You have light vaginal bleeding or spotting while pregnant.   You have abdominal pain or cramping.   You have a fever.     SEEK IMMEDIATE MEDICAL CARE IF:  You have heavy vaginal bleeding.   You have blood clots coming from your vagina.   You have severe low back pain or abdominal cramps.   You have fever, chills, and severe abdominal pain.     MAKE SURE YOU:  Understand these instructions.  Will watch your condition.  Will get help right away if you are not doing well or get worse.    ADDITIONAL NOTES AND INSTRUCTIONS    Please follow up with your Primary MD in 24-48 hr.  Seek immediate medical care for any new/worsening signs or symptoms.

## 2023-07-11 NOTE — ED PROVIDER NOTE - PROGRESS NOTE DETAILS
César: received patient as transfer from [facility] S, presented with VB in setting of early pregnancy, HCG is 74. Suspect miscarriage, less likely ectopic given lack of pain. Will get US, reassess. César: received patient as transfer from [facility] S, presented with VB in setting of early pregnancy, HCG is 74. Suspect miscarriage, less likely ectopic given lack of pain. Will get US, reassess. Of note, follows with Dr. Richey, is on letrazole for infertility 2/2 PCOS.     LMP 6/ - 5w5d,  Received sign out from Dr. Singh pending rhrogam and reassessment.

## 2023-07-11 NOTE — ED PROVIDER NOTE - OBJECTIVE STATEMENT
32 year old female past medical history of Diabetes, PCOS, asthma comes to emergency room for vaginal bleeding. patient currently undergoing fertility treatment and states that she was late for her period this month and took preg test at home today and was positive. patient than tonight started to have light vaginal bleeding and took second one and was positive. patient states that she is now having crampy pelvic pain. no nausea, vomiting, diarrhea. no fever/chills.

## 2023-07-11 NOTE — PROGRESS NOTE ADULT - ASSESSMENT
31 yo  @ 5w5d by LMP, vaginal bleeding, pregnancy of unknown location, likely SAB, clinically and hemodynamically stable    -Bleeding precautions discussed  -Ectopic precautions given  -Tylenol for pain  -Bhcg repeat on    -DISPO per ED    Dr. Veliz and Dr. Anne to be aware 33 yo  @ 5w5d by LMP, vaginal bleeding, pregnancy of unknown location, threatened , clinically and hemodynamically stable    -Bleeding precautions discussed  -Ectopic precautions given  -Tylenol for pain  -Bhcg repeat on    -DISPO per ED    Dr. Veliz and Dr. Anne to be aware 33 yo  @ 5w5d by LMP, vaginal bleeding, pregnancy of unknown location, threatened , clinically and hemodynamically stable    -Bleeding precautions discussed  -Ectopic precautions given  -Tylenol for pain  -Bhcg repeat on    Recommend rhogam  -DISPO per ED    Dr. Veliz and Dr. Anne to be aware 31 yo  @ 5w5d by LMP, vaginal bleeding, pregnancy of unknown location, likely early pregnancy loss low suspicion for ectopic, clinically and hemodynamically stable    -Bleeding precautions discussed  -Ectopic precautions given  -Tylenol for pain  -Bhcg repeat on    -Recommend rhogam  -DISPO per ED    Dr. Veliz and Dr. Anne to be aware

## 2023-07-11 NOTE — ED ADULT NURSE REASSESSMENT NOTE - NS ED NURSE REASSESS COMMENT FT1
Pt transferred for [facility] for transvaginal US. Per pt, pt had vaginal bleeding and 2x positive pregnancy test at home. Pt AOx4, arrived to [facility] with PIV on LAC 18g.

## 2023-07-11 NOTE — PROGRESS NOTE ADULT - SUBJECTIVE AND OBJECTIVE BOX
Chief Complaint: vaginal bleeding    HPI: 31 yo  @ 5w5d by LMP, presents to ED for vaginal bleeding which started last night. She states that she saw blood when she wiped, has not saturated a full pad since then. She endorses some mild cramping on and off, 4/10 pain. She denies any severe abdominal pain, heavy vaginal bleeding, dizziness, lightheadedness, nausea, vomiting, chest pain, sob, dysuria. She took letrizole this cycle, prescribed by Dr. Kaiden abrams OBGYN. She has a history of TIIDM and asthma, no hospitalizations.    Ob/Gyn History:                   LMP -             Cycle Length -   Denies history of ovarian cysts, uterine fibroids, abnormal paps, or STIs    OBhx:    Denies the following: constitutional symptoms, visual symptoms, cardiovascular symptoms, respiratory symptoms, GI symptoms, musculoskeletal symptoms, skin symptoms, neurologic symptoms, hematologic symptoms, allergic symptoms, psychiatric symptoms  Except any pertinent positives listed.     Home Medications:  Ozempic (0.25 mg or 0.5 mg dose) 2 mg/1.5 mL subcutaneous solution: subcutaneous once a week (2019 10:07)  Singulair 10 mg oral tablet: 1 tab(s) orally once a day (2019 10:07)  Symbicort 160 mcg-4.5 mcg/inh inhalation aerosol: inhaled once a day (2019 10:07)  Synjardy 12.5 mg-1000 mg oral tablet: 1 tab(s) orally 2 times a day (2019 10:07)  Targadox 50 mg oral tablet: 2 tab(s) orally once a day (2019 10:07)      Allergies  No Known Allergies  Intolerances      PAST MEDICAL & SURGICAL HISTORY:  Asthma  Diabetes mellitus, type 2  Eczema  PCOS (polycystic ovarian syndrome)  No significant past surgical history      FAMILY HISTORY:      SOCIAL HISTORY: Denies cigarette use, alcohol use, or illicit drug use    Vital Signs Last 24 Hrs  T(F): 98.4 (2023 00:47), Max: 98.4 (2023 00:47)  HR: 101 (2023 00:47) (101 - 101)  BP: 126/60 (2023 00:47) (126/60 - 126/60)  RR: 20 (2023 00:47) (20 - 20)  Height (cm): 160 (23 @ 00:47)  Weight (kg): 98.9 (23 @ 00:47)  BMI (kg/m2): 38.6 (23 @ 00:47)  BSA (m2): 2.01 (23 @ 00:47)    UO:     General Appearance - AAOx3, NAD  Heart - S1S2 regular rate and rhythm  Lung - CTA Bilaterally  Abdomen - Soft, nontender, nondistended, no rebound, no rigidity, no guarding, bowel sounds present    GYN/Pelvis:  External female genitalia - normal  Vagina - 2cc of dark red blood  Cervix - Closed  Bimanual exam - no cmt, no masses, no pain, normal sized uterus    Meds:   sodium chloride 0.9% Bolus 1000 milliLiter(s) IV Bolus once    Height (cm): 160 (23 @ 00:47)  Weight (kg): 98.9 (23 00:47)  BMI (kg/m2): 38.6 (23 @ 00:47)  BSA (m2): 2.01 (23 @ 00:47)    LABS:                        14.2   11.20 )-----------( 340      ( 2023 01:16 )             45.1     HCG Quantitative, Serum: 74.2 mIU/mL (23 @ 01:16)    ABO RH Interpretation: O NEG (23 @ 01:16)  Antibody Screen: NEG (23 @ 01:16)        139  |  102  |  14  ----------------------------<  140<H>  3.9   |  23  |  0.6<L>    Ca    9.1      2023 01:16    TPro  7.5  /  Alb  4.6  /  TBili  <0.2  /  DBili  <0.2  /  AST  14  /  ALT  15  /  AlkPhos  87        Urinalysis Basic - ( 2023 01:16 )    Color: Yellow / Appearance: Clear / SG: >=1.030 / pH: x  Gluc: 140 mg/dL / Ketone: 160  / Bili: Small / Urobili: 0.2 mg/dL   Blood: x / Protein: Trace mg/dL / Nitrite: Negative   Leuk Esterase: Negative / RBC: 6-10 /HPF / WBC 1-2 /HPF   Sq Epi: x / Non Sq Epi: x / Bacteria: Few /HPF          RADIOLOGY & ADDITIONAL STUDIES:  < from: US Transvaginal (23 @ 05:06) >    ACC: [id number] EXAM:  US TRANSVAGINAL   ORDERED BY: NO RUSS     PROCEDURE DATE:  2023          INTERPRETATION:  CLINICAL INFORMATION: Vaginal bleeding. Pregnant   patient. Beta-hCG 74.2.    LMP: 2023.    COMPARISON: Pelvic ultrasound 2019.    TECHNIQUE:  Endovaginal pelvic sonogram only. Color and Spectral Doppler was   performed.    FINDINGS:  Uterus: 6.9 x 3.4 x 4 cm. Within normal limits.  Endometrium: 1.3 cm. Within normal limits.    Right ovary: 2.1 x 1.4 x 1.7 cm. Within normal limits. Doppler flow is   demonstrated.  Left ovary: 2.4 x 1.9 x 2.2 cm. Hemorrhagic corpus luteal cyst measures   1.9 x 1.7 x 1.6 cm. Doppler flow is demonstrated to the ovary.    Fluid: None.    IMPRESSION:  No intrauterine pregnancy is identified at this time. Findings may   represent early pregnancy or pregnancy of unknown location. No adnexal   masses.    Left ovarian hemorrhagic cyst measuring up to 1.9 cm.        --- End of Report ---          DOMINICK CERON MD; Resident Radiologist  This document has been electronically signed.  AHSAN OCONNELL MD; Attending Radiologist  This document has been electronically signed. 2023  6:04AM    < end of copied text >

## 2023-07-11 NOTE — ED PROVIDER NOTE - CARE PROVIDER_API CALL
Donis Richey  Obstetrics and Gynecology  [address] [place] [id number]  Phone: [phone]  Fax: [phone]  Established Patient  Follow Up Time: 1-3 Days

## 2023-07-11 NOTE — ED PROVIDER NOTE - CLINICAL SUMMARY MEDICAL DECISION MAKING FREE TEXT BOX
Findings consistent with threatened ab vs early pregnancy vs pregnancy of unknown location.    Patient was seen by GYN, provided with ectopic and return precautions, follow up information for repeat beta in 48 hours.    She is Rh - so Rhogam was given.    Advised on monitoring, follow up, bleeding precautions.

## 2023-07-11 NOTE — ED ADULT NURSE NOTE - NSICDXPASTMEDICALHX_GEN_ALL_CORE_FT
PAST MEDICAL HISTORY:  Asthma     Diabetes mellitus, type 2     Eczema     PCOS (polycystic ovarian syndrome)

## 2023-07-11 NOTE — ED ADULT NURSE NOTE - NSFALLUNIVINTERV_ED_ALL_ED
Bed/Stretcher in lowest position, wheels locked, appropriate side rails in place/Call bell, personal items and telephone in reach/Instruct patient to call for assistance before getting out of bed/chair/stretcher/Non-slip footwear applied when patient is off stretcher/[place] to call system/Physically safe environment - no spills, clutter or unnecessary equipment/Purposeful proactive rounding/Room/bathroom lighting operational, light cord in reach

## 2023-07-11 NOTE — PROGRESS NOTE ADULT - ATTENDING COMMENTS
5+5, rh(-) with early iup vs early pregnancy loss. Rhogam given. Repeat bhcg in 48 hours. Bleeding precautions given

## 2023-07-11 NOTE — ED ADULT NURSE NOTE - NSFALLLASTSIX_ED_ALL_ED
Chief Complaint   Patient presents with   • Annual Exam     fasting physical        Subjective     History of Present Illness     Well Adult Physical: Patient here for a comprehensive physical exam.The patient reports no problems  Do you take any herbs or supplements that were not prescribed by a doctor? no Are you taking calcium supplements? no Are you taking aspirin daily? no      The following portions of the patient's history were reviewed and updated as appropriate: allergies, current medications, past family history, past medical history, past social history, past surgical history and problem list.    She has recently had dust mites cats and a tree.  Seen allergy last week and sees ENT 18    Has had hip surgery do to hip dysplasia and doing exercise and PT and doing well.     Sees [facility] in [place] normal last year    Has derm [facility] sees yearly.    FH dad  at 63 earliest  And MI at 49yo    Review of Systems   Constitutional: Negative for chills, fatigue, fever, unexpected weight gain and unexpected weight loss.        No night sweats.  No generalized pain.   HENT: Negative for congestion, ear pain, hearing loss, nosebleeds, postnasal drip, rhinorrhea, sinus pressure, sneezing, sore throat, tinnitus and voice change.         Denies snoring.   Eyes: Negative for photophobia, pain, discharge, redness, itching and visual disturbance.   Respiratory: Negative for cough, chest tightness, shortness of breath, wheezing and stridor.         No orthopnea, dyspnea on exertion, or PND.  No chest congestion.   No  hemoptysis.   Cardiovascular: Negative for chest pain, palpitations and leg swelling.        No claudication or syncope.   Gastrointestinal: Negative for abdominal pain, blood in stool, constipation, diarrhea, nausea, rectal pain and vomiting.        No hematemesis.  No heartburn, dysphagia or odynophagia.  No belching or bloating.  No melena.   Endocrine: Negative for cold  intolerance, heat intolerance, polydipsia, polyphagia, polyuria and breast discharge.        No hair loss or dry skin.  No generalized weakness.  No hot flashes.   Genitourinary: Negative for urinary incontinence, difficulty urinating, dyspareunia, dysuria, flank pain, frequency, hematuria, menstrual problem, pelvic pain, urgency, vaginal bleeding and vaginal discharge.        No nocturia or incomplete emptying.   Musculoskeletal: Negative for arthralgias, back pain, gait problem, joint swelling, myalgias, neck pain and neck stiffness.        No joint stiffness.   Skin: Negative for rash.   Neurological: Negative for dizziness, tremors, syncope, speech difficulty, weakness, light-headedness, numbness, memory problem and confusion.        No tingling.   Hematological: Negative for adenopathy. Does not bruise/bleed easily.   Psychiatric/Behavioral: Negative for decreased concentration, sleep disturbance, suicidal ideas and depressed mood. The patient is not nervous/anxious.         No confusion.   All other systems reviewed and are negative.      Objective   Physical Exam   Constitutional: She is oriented to person, place, and time. She appears well-developed and well-nourished.   HENT:   Head: Normocephalic and atraumatic.   Right Ear: External ear normal.   Left Ear: External ear normal.   Nose: Nose normal.   Mouth/Throat: Oropharynx is clear and moist.   Eyes: Pupils are equal, round, and reactive to light. Conjunctivae are normal. Right eye exhibits no discharge. Left eye exhibits no discharge. No scleral icterus.   Neck: Normal range of motion. No thyromegaly present.   Cardiovascular: Normal rate, regular rhythm, normal heart sounds and intact distal pulses.    No murmur heard.  Pulmonary/Chest: Effort normal and breath sounds normal.   Abdominal: Soft. Bowel sounds are normal. She exhibits no distension. There is no tenderness.   Musculoskeletal: Normal range of motion.   Lymphadenopathy:     She has no  cervical adenopathy.   Neurological: She is alert and oriented to person, place, and time.   Skin: Skin is warm and dry. Capillary refill takes 2 to 3 seconds.   Psychiatric: She has a normal mood and affect. Her behavior is normal.   Nursing note and vitals reviewed.         Assessment/Plan   Julia was seen today for annual exam.    Diagnoses and all orders for this visit:    Annual physical exam  -     Comprehensive Metabolic Panel  -     CBC & Differential  -     Lipid Panel  -     TSH  -     POC Urinalysis Dipstick, Automated  -     QuantiFERON TB Gold; Future  -     ECG 12 Lead; Future  -     Cancel: ECG 12 Lead  -     Cancel: QuantiFERON TB Gold  -     TSPOT  -     CBC Auto Differential    Hip dysplasia    Hyperlipidemia, unspecified hyperlipidemia type    Class 1 obesity due to excess calories without serious comorbidity with body mass index (BMI) of 33.0 to 33.9 in adult    Leukocytes in urine  -     Urine Culture - Urine, Urine, Clean Catch        Hip dysplasia resolved with hip surgery.   hip replacement 1/18    ECG 12 Lead  Date/Time: 7/25/2018 8:17 AM  Performed by: LINDA ELIZONDO  Authorized by: LINDA ELIZONDO   Rhythm: sinus rhythm  Rate: normal  Conduction: conduction normal  ST Segments: ST segments normal  T Waves: T waves normal  QRS axis: normal  Other: no other findings          Patient education regarding the importance of avoidance of texting while driving and the need for wearing seatbelt.  Use of sunscreen, use of helmets while on bikes.  The appropriate amounts of sleep and H20 consumption per day was reviewed with pt.  The need for healthy well-balanced diet based off the food guide pyramid and avoidance of skipping meals along with following a NCS diet with appropriate amounts of fats, protein, and carbohydrate and low sodium diet. Encouraging 30minutes of cardio 5d weekly and muscle strengthening 3x weekly.       Return in about 6 months (around 1/25/2019) for fasting, Next scheduled  follow up.  RTC/call  If symptoms worsen  Meds MOA and SE's reviewed and pt v/u   No.

## 2023-07-11 NOTE — ED PROVIDER NOTE - ATTENDING APP SHARED VISIT CONTRIBUTION OF CARE
I reviewed and verified the documentation and  and independently performed the documented    33-year-old female history of diabetes asthma LMP 6/1 on fertility treatments 1 month at the site of her menstruation with her.  This month her pregnancy test today x2  both showed positive patient here now she had vaginal bleeding with cramping in ED patient.  [place] positive urine pregnancy.  Patient sent to [place] for ultrasound rule out ectopic.

## 2023-07-11 NOTE — ED PROVIDER NOTE - PATIENT PORTAL LINK FT
You can access the FollowMyHealth Patient Portal offered by [facility] by registering at the following website: [email or web address]. By joining [facility]’s FollowMyHealth portal, you will also be able to view your health information using other applications (apps) compatible with our system.

## 2023-07-12 DIAGNOSIS — J45.909 UNSPECIFIED ASTHMA, UNCOMPLICATED: ICD-10-CM

## 2023-07-12 DIAGNOSIS — E28.2 POLYCYSTIC OVARIAN SYNDROME: ICD-10-CM

## 2023-07-12 DIAGNOSIS — Z3A.01 LESS THAN 8 WEEKS GESTATION OF PREGNANCY: ICD-10-CM

## 2023-07-12 DIAGNOSIS — O20.0 THREATENED ABORTION: ICD-10-CM

## 2023-07-12 DIAGNOSIS — E11.9 TYPE 2 DIABETES MELLITUS WITHOUT COMPLICATIONS: ICD-10-CM

## 2023-07-12 DIAGNOSIS — O20.9 HEMORRHAGE IN EARLY PREGNANCY, UNSPECIFIED: ICD-10-CM

## 2023-07-12 DIAGNOSIS — O99.281 ENDOCRINE, NUTRITIONAL AND METABOLIC DISEASES COMPLICATING PREGNANCY, FIRST TRIMESTER: ICD-10-CM

## 2023-07-12 DIAGNOSIS — O99.511 DISEASES OF THE RESPIRATORY SYSTEM COMPLICATING PREGNANCY, FIRST TRIMESTER: ICD-10-CM

## 2023-07-13 PROBLEM — E28.2 POLYCYSTIC OVARIAN SYNDROME: Chronic | Status: ACTIVE | Noted: 2023-07-11

## 2023-07-13 LAB
CULTURE RESULTS: SIGNIFICANT CHANGE UP
SPECIMEN SOURCE: SIGNIFICANT CHANGE UP

## 2023-07-13 NOTE — CHART NOTE - NSCHARTNOTEFT_GEN_A_CORE
PGY 2 Note    Called patient to remind her to complete repeat blood work. Pt did not answer. Voicemail left with instructions and callback number.    Dr. Chandler to be aware

## 2023-07-14 ENCOUNTER — EMERGENCY (EMERGENCY)
Facility: HOSPITAL | Age: 33
LOS: 0 days | Discharge: HOME | End: 2023-07-14
Attending: EMERGENCY MEDICINE
Payer: MEDICAID

## 2023-07-14 VITALS
HEART RATE: 92 BPM | WEIGHT: 218.04 LBS | TEMPERATURE: 98 F | RESPIRATION RATE: 18 BRPM | SYSTOLIC BLOOD PRESSURE: 123 MMHG | OXYGEN SATURATION: 98 % | HEIGHT: 63 IN | DIASTOLIC BLOOD PRESSURE: 59 MMHG

## 2023-07-14 DIAGNOSIS — J45.909 UNSPECIFIED ASTHMA, UNCOMPLICATED: ICD-10-CM

## 2023-07-14 DIAGNOSIS — Z87.2 PERSONAL HISTORY OF DISEASES OF THE SKIN AND SUBCUTANEOUS TISSUE: ICD-10-CM

## 2023-07-14 DIAGNOSIS — N93.9 ABNORMAL UTERINE AND VAGINAL BLEEDING, UNSPECIFIED: ICD-10-CM

## 2023-07-14 DIAGNOSIS — E28.2 POLYCYSTIC OVARIAN SYNDROME: ICD-10-CM

## 2023-07-14 DIAGNOSIS — O03.9 COMPLETE OR UNSPECIFIED SPONTANEOUS ABORTION WITHOUT COMPLICATION: ICD-10-CM

## 2023-07-14 DIAGNOSIS — E11.9 TYPE 2 DIABETES MELLITUS WITHOUT COMPLICATIONS: ICD-10-CM

## 2023-07-14 LAB
ALBUMIN SERPL ELPH-MCNC: 4.4 G/DL — SIGNIFICANT CHANGE UP (ref 3.5–5.2)
ALP SERPL-CCNC: 86 U/L — SIGNIFICANT CHANGE UP (ref 30–115)
ALT FLD-CCNC: 21 U/L — SIGNIFICANT CHANGE UP (ref 0–41)
ANION GAP SERPL CALC-SCNC: 13 MMOL/L — SIGNIFICANT CHANGE UP (ref 7–14)
AST SERPL-CCNC: 13 U/L — SIGNIFICANT CHANGE UP (ref 0–41)
BASOPHILS # BLD AUTO: 0.05 K/UL — SIGNIFICANT CHANGE UP (ref 0–0.2)
BASOPHILS NFR BLD AUTO: 0.4 % — SIGNIFICANT CHANGE UP (ref 0–1)
BILIRUB SERPL-MCNC: <0.2 MG/DL — SIGNIFICANT CHANGE UP (ref 0.2–1.2)
BUN SERPL-MCNC: 17 MG/DL — SIGNIFICANT CHANGE UP (ref 10–20)
CALCIUM SERPL-MCNC: 9.7 MG/DL — SIGNIFICANT CHANGE UP (ref 8.4–10.5)
CHLORIDE SERPL-SCNC: 102 MMOL/L — SIGNIFICANT CHANGE UP (ref 98–110)
CO2 SERPL-SCNC: 25 MMOL/L — SIGNIFICANT CHANGE UP (ref 17–32)
CREAT SERPL-MCNC: 0.8 MG/DL — SIGNIFICANT CHANGE UP (ref 0.7–1.5)
EGFR: 99 ML/MIN/1.73M2 — SIGNIFICANT CHANGE UP
EOSINOPHIL # BLD AUTO: 0.17 K/UL — SIGNIFICANT CHANGE UP (ref 0–0.7)
EOSINOPHIL NFR BLD AUTO: 1.5 % — SIGNIFICANT CHANGE UP (ref 0–8)
GLUCOSE SERPL-MCNC: 217 MG/DL — HIGH (ref 70–99)
HCG SERPL-ACNC: 72 MIU/ML — HIGH
HCT VFR BLD CALC: 43.9 % — SIGNIFICANT CHANGE UP (ref 37–47)
HGB BLD-MCNC: 13.8 G/DL — SIGNIFICANT CHANGE UP (ref 12–16)
IMM GRANULOCYTES NFR BLD AUTO: 0.3 % — SIGNIFICANT CHANGE UP (ref 0.1–0.3)
LYMPHOCYTES # BLD AUTO: 19.3 % — LOW (ref 20.5–51.1)
LYMPHOCYTES # BLD AUTO: 2.25 K/UL — SIGNIFICANT CHANGE UP (ref 1.2–3.4)
MCHC RBC-ENTMCNC: 25.4 PG — LOW (ref 27–31)
MCHC RBC-ENTMCNC: 31.4 G/DL — LOW (ref 32–37)
MCV RBC AUTO: 80.8 FL — LOW (ref 81–99)
MONOCYTES # BLD AUTO: 0.8 K/UL — HIGH (ref 0.1–0.6)
MONOCYTES NFR BLD AUTO: 6.8 % — SIGNIFICANT CHANGE UP (ref 1.7–9.3)
NEUTROPHILS # BLD AUTO: 8.38 K/UL — HIGH (ref 1.4–6.5)
NEUTROPHILS NFR BLD AUTO: 71.7 % — SIGNIFICANT CHANGE UP (ref 42.2–75.2)
NRBC # BLD: 0 /100 WBCS — SIGNIFICANT CHANGE UP (ref 0–0)
PLATELET # BLD AUTO: 340 K/UL — SIGNIFICANT CHANGE UP (ref 130–400)
PMV BLD: 9.3 FL — SIGNIFICANT CHANGE UP (ref 7.4–10.4)
POTASSIUM SERPL-MCNC: 4.3 MMOL/L — SIGNIFICANT CHANGE UP (ref 3.5–5)
POTASSIUM SERPL-SCNC: 4.3 MMOL/L — SIGNIFICANT CHANGE UP (ref 3.5–5)
PROT SERPL-MCNC: 6.8 G/DL — SIGNIFICANT CHANGE UP (ref 6–8)
RBC # BLD: 5.43 M/UL — HIGH (ref 4.2–5.4)
RBC # FLD: 15.4 % — HIGH (ref 11.5–14.5)
SODIUM SERPL-SCNC: 140 MMOL/L — SIGNIFICANT CHANGE UP (ref 135–146)
WBC # BLD: 11.68 K/UL — HIGH (ref 4.8–10.8)
WBC # FLD AUTO: 11.68 K/UL — HIGH (ref 4.8–10.8)

## 2023-07-14 PROCEDURE — 86850 RBC ANTIBODY SCREEN: CPT

## 2023-07-14 PROCEDURE — 85025 COMPLETE CBC W/AUTO DIFF WBC: CPT

## 2023-07-14 PROCEDURE — 86901 BLOOD TYPING SEROLOGIC RH(D): CPT

## 2023-07-14 PROCEDURE — 36415 COLL VENOUS BLD VENIPUNCTURE: CPT

## 2023-07-14 PROCEDURE — 86880 COOMBS TEST DIRECT: CPT

## 2023-07-14 PROCEDURE — 76830 TRANSVAGINAL US NON-OB: CPT | Mod: 26

## 2023-07-14 PROCEDURE — 84702 CHORIONIC GONADOTROPIN TEST: CPT

## 2023-07-14 PROCEDURE — 99285 EMERGENCY DEPT VISIT HI MDM: CPT

## 2023-07-14 PROCEDURE — 76830 TRANSVAGINAL US NON-OB: CPT

## 2023-07-14 PROCEDURE — 86870 RBC ANTIBODY IDENTIFICATION: CPT

## 2023-07-14 PROCEDURE — 99284 EMERGENCY DEPT VISIT MOD MDM: CPT | Mod: 25

## 2023-07-14 PROCEDURE — 86900 BLOOD TYPING SEROLOGIC ABO: CPT

## 2023-07-14 PROCEDURE — 80053 COMPREHEN METABOLIC PANEL: CPT

## 2023-07-14 RX ORDER — MISOPROSTOL 200 UG/1
4 TABLET ORAL
Qty: 4 | Refills: 0
Start: 2023-07-14 | End: 2023-07-14

## 2023-07-14 RX ORDER — IBUPROFEN 200 MG
1 TABLET ORAL
Qty: 1 | Refills: 0
Start: 2023-07-14

## 2023-07-14 RX ORDER — SODIUM CHLORIDE 9 MG/ML
1000 INJECTION, SOLUTION INTRAVENOUS ONCE
Refills: 0 | Status: DISCONTINUED | OUTPATIENT
Start: 2023-07-14 | End: 2023-07-14

## 2023-07-14 NOTE — ED ADULT NURSE NOTE - NSFALLUNIVINTERV_ED_ALL_ED
Bed/Stretcher in lowest position, wheels locked, appropriate side rails in place/Call bell, personal items and telephone in reach/Instruct patient to call for assistance before getting out of bed/chair/stretcher/Non-slip footwear applied when patient is off stretcher/[place] to call system/Physically safe environment - no spills, clutter or unnecessary equipment/Purposeful proactive rounding/Room/bathroom lighting operational, light cord in reach

## 2023-07-14 NOTE — ED PROVIDER NOTE - ATTENDING CONTRIBUTION TO CARE
32-year-old female with past medical history of PCOS, diabetes, asthma, last menstrual period  approximately 6 weeks gestation,  2 para 0-0-1-0 presents with vaginal bleeding for the past 5 days, initially started as spotting but now has become more like a full.  As per patient.  Patient was seen in the emergency department on  and seen by OB/GYN, bleeding precautions were provided, beta-hCG that today was 74.2, had repeat beta-hCG on yesterday that was 70 and then repeat today that was 70 and was told due to the numbers needs to come out to the emergency department for repeat ultrasound.  Patient was given RhoGAM on previous visit.  denies fever, chills, n/v, cp, sob, pleuritic chest pain, palpitations, diaphoresis, cough, abd pain, diarrhea, constipation, melena/brbpr, urinary symptoms, back/ flank pain, vaginal discharge/odor, sick contacts, recent travel or rash.     on exam: nontoxic appearing pt sitting on stretcher in nad, no rash, mmm, regular rate, radial pulses 2/4 b/l, no jvd, ctabl w/ breath sounds present b/l, no wheezing or crackles, no accessory muscle use, no tachypnea, no stridor, bs present throughout all 4 quadrants, abd soft, nd,nt,  no rebound tenderness or guarding, no cvat, FROM of ext, no edema, no calf pain/swelling/erythema, AAOx3. No focal deficits.    Plan: labs, ivf, ultrasound, ob gyn consult, reassess.

## 2023-07-14 NOTE — PROGRESS NOTE ADULT - SUBJECTIVE AND OBJECTIVE BOX
PGY 3 Note    HPI: Patient is well known to gyn service for r/o abnormal IUP vs. ectopic. Patient called into ER for evaluation by GYN team for plateauing beta hcg. Patient states today she was having 4/10 midline cramping, currently 0/10 without medications. States bleeding is like a light period. Denies saturating pads or clots. Denies dizziness, lightheadedness SOB or chest pain. No other complaints. Denies nausea, vomiting, diarrhea, constipation, dysuria or urinary urgency. Pregnancy is highly desired.     T(F): 98.2 (07-14-23 @ 16:25), Max: 98.2 (07-14-23 @ 16:25)  HR: 92 (07-14-23 @ 16:25) (92 - 92)  BP: 123/59 (07-14-23 @ 16:25) (123/59 - 123/59)  RR: 18 (07-14-23 @ 16:25) (18 - 18)  SpO2: 98% (07-14-23 @ 16:25) (98% - 98%)    Physical Exam:  General: AAOx3. NAD  CVS: RRR. Nl S1S2  Lungs: CTAB  Abdomen: soft, non-tender, non-distended, +BSx4  Ext: No edema. SCDs in place  Speculum: 10cc dark brown blood in vagina. no active bleeding. cervix is closed.  Bimanual: uterus difficult to assess due to body habitus    Labs:             13.8   11.68<H> )-----------( 340      ( 07-14 @ 17:35 )             43.9                14.2   11.20<H> )-----------( 340      ( 07-11 @ 01:16 )             45.1      07-14    140  |  102  |  17  ----------------------------<  217<H>  4.3   |  25  |  0.8    Ca    9.7      14 Jul 2023 17:35    TPro  6.8  /  Alb  4.4  /  TBili  <0.2  /  DBili  x   /  AST  13  /  ALT  21  /  AlkPhos  86  07-14        Trend:             13.8   11.68<H> )-----------( 340      ( 07-14 @ 17:35 )             43.9                14.2   11.20<H> )-----------( 340      ( 07-11 @ 01:16 )             45.1         Creatinine: 0.8 (07-14)  Creatinine: 0.6 (07-11)      Medications:  MEDICATIONS  (STANDING):  lactated ringers Bolus 1000 milliLiter(s) IV Bolus once    MEDICATIONS  (PRN):    Radiology:      < from: US Transvaginal (07.14.23 @ 18:28) >  US TRANSVAGINAL   ORDERED BY: ALBERTO PAREDES     PROCEDURE DATE:  07/14/2023          INTERPRETATION:  CLINICAL INFORMATION: Pregnancy.    LMP: 06/01/2023    COMPARISON: 7/11/2023    TECHNIQUE:  Endovaginal and transabdominal pelvic sonogram. Color and Spectral   Doppler was performed.    FINDINGS:  Uterus: 5.7 cm x 3.5 cm x 4.2 cm. Within normal limits.  Endometrium: 6 mm. no intrauterine pregnancy seen.    Right ovary: 2.2 cm x 1.2 cm x 2.3 cm. Within normal limits. Normal   arterial and venous waveforms.  Left ovary: 3.2 cm x 2.3 cm x 1.8 cm. Within normal limits. Normal   arterial and venous waveforms.    Fluid: Trace.    IMPRESSION:    No intrauterine pregnancy seen. No ectopic pregnancy on provided images.    Correlate with beta-hCG trend and follow-up as needed.        --- End of Report ---            < end of copied text >

## 2023-07-14 NOTE — ED PROVIDER NOTE - CLINICAL SUMMARY MEDICAL DECISION MAKING FREE TEXT BOX
32-year-old female with past medical history of PCOS, diabetes, asthma, last menstrual period  approximately 6 weeks gestation,  2 para 0-0-1-0 presents with vaginal bleeding for the past 5 days, initially started as spotting but now has become more like a full.  As per patient.  Patient was seen in the emergency department on  and seen by OB/GYN, bleeding precautions were provided, beta-hCG that today was 74.2, had repeat beta-hCG on yesterday that was 70 and then repeat today that was 70 and was told due to the numbers needs to come out to the emergency department for repeat ultrasound.  Patient was given RhoGAM on previous visit.  denies fever, chills, n/v, cp, sob, pleuritic chest pain, palpitations, diaphoresis, cough, abd pain, diarrhea, constipation, melena/brbpr, urinary symptoms, back/ flank pain, vaginal discharge/odor, sick contacts, recent travel or rash.     on exam: nontoxic appearing pt sitting on stretcher in nad, no rash, mmm, regular rate, radial pulses 2/4 b/l, no jvd, ctabl w/ breath sounds present b/l, no wheezing or crackles, no accessory muscle use, no tachypnea, no stridor, bs present throughout all 4 quadrants, abd soft, nd,nt,  no rebound tenderness or guarding, no cvat, FROM of ext, no edema, no calf pain/swelling/erythema, AAOx3. No focal deficits.    Plan: labs, ivf, ultrasound, ob gyn consult, reassess.    Labs were ordered and reviewed.  Imaging was ordered and reviewed by me.  Appropriate medications for patient's presenting complaints were ordered and effects were reassessed.  Patient's records (prior hospital, ED visit) were reviewed.  ob gyn consulted.

## 2023-07-14 NOTE — ED PROVIDER NOTE - PATIENT PORTAL LINK FT
You can access the FollowMyHealth Patient Portal offered by [facility] by registering at the following website: [email or web address]. By joining [facility]’s FollowMyHealth portal, you will also be able to view your health information using other applications (apps) compatible with our system.

## 2023-07-14 NOTE — ED PROVIDER NOTE - PHYSICAL EXAMINATION
As Follows:  CONST: Well appearing in NAD  EYES: PERRL, EOMI, Sclera and conjunctiva clear.   ENT: No nasal discharge. Oropharynx normal appearing, no erythema or exudates. Uvula midline  CARD: No murmurs, rubs, or gallops; Normal rate and rhythm  RESP: BS Equal B/L, No wheezes, rhonchi or rales. No distress or accessory breathing  GI: Soft, non-tender, non-distended. No CVA tenderness.   SKIN: Warm, dry, no acute rashes. MMM  NEURO: A&Ox3, No focal deficits. Strength and sensation intact. Steady gait

## 2023-07-14 NOTE — ED PROVIDER NOTE - MDM ORDERS SUBMITTED SELECTION
Refill request sent via e-prescribing to the pharmacy per protocol.  
Labs/Imaging Studies/Medications

## 2023-07-14 NOTE — CHART NOTE - NSCHARTNOTEFT_GEN_A_CORE
PGY 2 Note    Called patient to discuss results of hcg level from yesterday. Over 48 hours hcg 74.5 --> 70, inappropriate plateau. Pt reports she is experiencing abdominal cramping and vaginal bleeding. Encouraged patient to present to ED at this time for further evaluation and treatment. Pt agreeable, says she will come to ED now.     Dr. Chandler to be aware

## 2023-07-14 NOTE — ED ADULT TRIAGE NOTE - CHIEF COMPLAINT QUOTE
pt with c/o vaginal bleeding with lower abdominal cramping , pt stated she is around 5 weeks pregnant.

## 2023-07-14 NOTE — ED PROVIDER NOTE - OBJECTIVE STATEMENT
Patient is a 32-year-old female with past medical history of diabetes, PCOS, asthma presenting for vaginal bleeding and lower abdominal cramping since 4 days ago.  Patient presented to [facility] ED for same complaint and was transferred to [place] 4 days ago at that time ultrasound was nonconclusive for intrauterine pregnancy with a likely diagnosis of pregnancy of unknown location.  hCG at that time was 74 and OB was consulted.  Patient was discharged with OB follow-up and outpatient labs done yesterday showed hCG of 70.  OB Dr. Hamilton called patient and referred her to come to ED for repeat ultrasound and labs to rule out ectopic pregnancy.  She states that she has been using 1 pad per day since Monday for her vaginal bleeding.  She denies any fever, cough, sore throat, nausea, vomiting, chest pain, shortness of breath, severe abdominal pain, urinary complaints including dysuria/hematuria/urgency/frequency.

## 2023-07-14 NOTE — ED PROVIDER NOTE - NSFOLLOWUPINSTRUCTIONS_ED_ALL_ED_FT
Our Emergency Department Referral Coordinators will be reaching out to you in the next 24-48 hours from 9:00am to 5:00pm (Monday to Friday) with a follow up appointment. Please expect a phone call from the hospital in that time frame. If you do not receive a call or if you have any questions or concerns, you can reach them at [phone] or [phone] or [phone]

## 2023-07-14 NOTE — ED PROVIDER NOTE - PROGRESS NOTE DETAILS
[facility] - Spoke with OB Senior [id number] Dr Ye who called for patient to present to ED. labs, including hcg/type and screen, and transvaginal US ordered. Will see pt when US results for recs/plan. JOSE LUIS-- pt signed out to me pending transvag ultrasound and OB recs.   Transvaginal US read as no visible IUP or ectopic. spoke with OB, pending recs ED Attending KAYLIN Greenfield  Patient endorsed to Dr. Daugherty.  Please follow-up OB/GYN, reassess. Discussed with OBGYN on call, patient was evaluated by OBGYN, and recommended outpatient follow up. They are sending the prescriptions and they discussed after care instructions with patient.

## 2023-07-14 NOTE — PROGRESS NOTE ADULT - ATTENDING COMMENTS
33 yo  @ 6w1d by LMP, vaginal bleeding, pregnancy of unknown location. Labs and images reviewed and clinical picture is more c/w abnormal IUP although cannot r/o ectopic. Patient clinically and hemodynamically stable and discussed potential plan for misoprostol administration with continued trend of bHCG within 48hrs.  Agree with plan above as discussed with PGY2 and patient  strict precautions given

## 2023-07-14 NOTE — PROGRESS NOTE ADULT - ASSESSMENT
33 yo  @ 6w1d by LMP, vaginal bleeding, pregnancy of unknown location, likely abnormal IUP, cannot r/o ectopic, clinically and hemodynamically stable    -discussed u/s and beta findings with patient. Discussed that due to plateauing bhcg levels, not a normal IUP. Differentials at this point include abnormal IUP or ectopic pregnancy. This pregnancy is highly desired. Offered expectant vs. medical (with cytotec) vs. surgical management with patient. Medical management being with cytotec because at this point there is higher suspicion for abnormal IUP than ectopic based on physical exam, history and u/s findings. Patient is very reluctant for any intervention at this time. However would like to consider medical management. Will send cytotec 800mcg to pharmacy.   -Patient unable to repeat bhcg on , would like to go monday for repeat bhcg, gyn to call patient this weekend to check in and f/u if she decided to take medication  -patient received rhogam  in ED, no need for additional rhogam dose at this time  -Bleeding precautions discussed  -Ectopic precautions given  -tylenol for pain as needed, (will send 800 mg ibuprofen to take if patient decides to take cytotec)  -Bhcg repeat on  (per patient request)   -DISPO per ED    Dr. Sneha vasquez

## 2023-07-18 LAB
HCG SERPL-MCNC: 186.8 MIU/ML
HCG SERPL-MCNC: 70 MIU/ML

## 2023-07-20 ENCOUNTER — APPOINTMENT (OUTPATIENT)
Dept: OBGYN | Facility: CLINIC | Age: 33
End: 2023-07-20
Payer: MEDICAID

## 2023-07-20 ENCOUNTER — OUTPATIENT (OUTPATIENT)
Dept: OUTPATIENT SERVICES | Facility: HOSPITAL | Age: 33
LOS: 1 days | End: 2023-07-20
Payer: MEDICAID

## 2023-07-20 VITALS
BODY MASS INDEX: 36.7 KG/M2 | SYSTOLIC BLOOD PRESSURE: 100 MMHG | DIASTOLIC BLOOD PRESSURE: 60 MMHG | HEIGHT: 64 IN | WEIGHT: 215 LBS | HEART RATE: 98 BPM | OXYGEN SATURATION: 100 %

## 2023-07-20 DIAGNOSIS — Z32.00 ENCOUNTER FOR PREGNANCY TEST, RESULT UNKNOWN: ICD-10-CM

## 2023-07-20 LAB — HCG SERPL-MCNC: 224.6 MIU/ML

## 2023-07-20 PROCEDURE — 76830 TRANSVAGINAL US NON-OB: CPT | Mod: 26

## 2023-07-20 PROCEDURE — 99215 OFFICE O/P EST HI 40 MIN: CPT | Mod: TH

## 2023-07-20 PROCEDURE — 99215 OFFICE O/P EST HI 40 MIN: CPT

## 2023-07-20 PROCEDURE — 76857 US EXAM PELVIC LIMITED: CPT

## 2023-07-21 ENCOUNTER — OUTPATIENT (OUTPATIENT)
Dept: OUTPATIENT SERVICES | Facility: HOSPITAL | Age: 33
LOS: 1 days | End: 2023-07-21
Payer: MEDICAID

## 2023-07-21 ENCOUNTER — APPOINTMENT (OUTPATIENT)
Dept: OBGYN | Facility: CLINIC | Age: 33
End: 2023-07-21
Payer: MEDICAID

## 2023-07-21 ENCOUNTER — ASOB RESULT (OUTPATIENT)
Age: 33
End: 2023-07-21

## 2023-07-21 ENCOUNTER — NON-APPOINTMENT (OUTPATIENT)
Age: 33
End: 2023-07-21

## 2023-07-21 ENCOUNTER — APPOINTMENT (OUTPATIENT)
Dept: ANTEPARTUM | Facility: CLINIC | Age: 33
End: 2023-07-21
Payer: MEDICAID

## 2023-07-21 DIAGNOSIS — Z34.90 ENCOUNTER FOR SUPERVISION OF NORMAL PREGNANCY, UNSPECIFIED, UNSPECIFIED TRIMESTER: ICD-10-CM

## 2023-07-21 DIAGNOSIS — N93.9 ABNORMAL UTERINE AND VAGINAL BLEEDING, UNSPECIFIED: ICD-10-CM

## 2023-07-21 DIAGNOSIS — N91.2 AMENORRHEA, UNSPECIFIED: ICD-10-CM

## 2023-07-21 DIAGNOSIS — O36.80X0 PREGNANCY WITH INCONCLUSIVE FETAL VIABILITY, NOT APPLICABLE OR UNSPECIFIED: ICD-10-CM

## 2023-07-21 LAB
ALBUMIN SERPL ELPH-MCNC: 4.7 G/DL
ALP BLD-CCNC: 96 U/L
ALT SERPL-CCNC: 31 U/L
ANION GAP SERPL CALC-SCNC: 13 MMOL/L
AST SERPL-CCNC: 15 U/L
BASOPHILS # BLD AUTO: 0.06 K/UL
BASOPHILS NFR BLD AUTO: 0.5 %
BILIRUB SERPL-MCNC: <0.2 MG/DL
BUN SERPL-MCNC: 15 MG/DL
CALCIUM SERPL-MCNC: 9.4 MG/DL
CHLORIDE SERPL-SCNC: 99 MMOL/L
CO2 SERPL-SCNC: 24 MMOL/L
CREAT SERPL-MCNC: 0.7 MG/DL
EGFR: 116 ML/MIN/1.73M2
EOSINOPHIL # BLD AUTO: 0.16 K/UL
EOSINOPHIL NFR BLD AUTO: 1.4 %
GLUCOSE SERPL-MCNC: 229 MG/DL
HCG SERPL-MCNC: 243.3 MIU/ML
HCT VFR BLD CALC: 45 %
HGB BLD-MCNC: 14.1 G/DL
IMM GRANULOCYTES NFR BLD AUTO: 0.3 %
LYMPHOCYTES # BLD AUTO: 2.22 K/UL
LYMPHOCYTES NFR BLD AUTO: 20 %
MAN DIFF?: NORMAL
MCHC RBC-ENTMCNC: 25.9 PG
MCHC RBC-ENTMCNC: 31.3 G/DL
MCV RBC AUTO: 82.7 FL
MONOCYTES # BLD AUTO: 0.57 K/UL
MONOCYTES NFR BLD AUTO: 5.1 %
NEUTROPHILS # BLD AUTO: 8.06 K/UL
NEUTROPHILS NFR BLD AUTO: 72.7 %
PLATELET # BLD AUTO: 354 K/UL
POTASSIUM SERPL-SCNC: 3.6 MMOL/L
PROT SERPL-MCNC: 7.2 G/DL
RBC # BLD: 5.44 M/UL
RBC # FLD: 15.3 %
SODIUM SERPL-SCNC: 136 MMOL/L
WBC # FLD AUTO: 11.1 K/UL

## 2023-07-21 PROCEDURE — 76856 US EXAM PELVIC COMPLETE: CPT | Mod: 26,59

## 2023-07-21 PROCEDURE — 99215 OFFICE O/P EST HI 40 MIN: CPT | Mod: TH

## 2023-07-21 PROCEDURE — 85027 COMPLETE CBC AUTOMATED: CPT

## 2023-07-21 PROCEDURE — 76830 TRANSVAGINAL US NON-OB: CPT | Mod: 26

## 2023-07-21 PROCEDURE — 84702 CHORIONIC GONADOTROPIN TEST: CPT

## 2023-07-21 PROCEDURE — 80053 COMPREHEN METABOLIC PANEL: CPT

## 2023-07-21 PROCEDURE — 36415 COLL VENOUS BLD VENIPUNCTURE: CPT

## 2023-07-21 PROCEDURE — 76830 TRANSVAGINAL US NON-OB: CPT

## 2023-07-21 PROCEDURE — 76856 US EXAM PELVIC COMPLETE: CPT

## 2023-07-21 PROCEDURE — 99215 OFFICE O/P EST HI 40 MIN: CPT

## 2023-07-21 RX ORDER — METHOTREXATE 2.5 MG/1
100 TABLET ORAL ONCE
Refills: 0 | Status: DISCONTINUED | OUTPATIENT
Start: 2023-07-21 | End: 2023-08-04

## 2023-07-21 RX ORDER — METHOTREXATE 25 MG/ML
50 INJECTION, SOLUTION INTRA-ARTERIAL; INTRAMUSCULAR; INTRAVENOUS
Qty: 4 | Refills: 0 | Status: DISCONTINUED | COMMUNITY
Start: 2023-07-21 | End: 2023-07-21

## 2023-07-21 RX ORDER — METHOTREXATE 25 MG/ML
50 INJECTION, SOLUTION INTRA-ARTERIAL; INTRAMUSCULAR; INTRAVENOUS
Qty: 4 | Refills: 0 | Status: ACTIVE | OUTPATIENT
Start: 2023-07-21

## 2023-07-21 NOTE — HISTORY OF PRESENT ILLNESS
[FreeTextEntry1] : Patient presenting today for follow up of emergency department visit for pregnancy of unknown location. Patient had been followed for plateaued bHCG values, then she was prescribed cytotect for abnormal pregnancy of unknown location at that time - then repeat HCG was noted to increased after patient experienced cramping and bleeding. Patient was called and informed of increasing HCG and was advised to present to clinic for management discussion vs presenting to the ED which patient wanted to avoid if possible. Patient presenting today feeling, she reports not pelvic pain but endorses continued bleeding. We discussed that while this may sound reassuring that the pregnancy is in the uterus, it is actually also consistent with and ectopic, especially in the setting of increasing HCG. We reviewed the ways to confirm ectopic pregnancy vs Abnormal IUP include - seeing the ectopic on ultrasound, or performing uterine aspiration for pregnancy to either identify pregnancy tissue in aspirate or repeat HCG value to see decline in levels, and if levels increase or remain the same this would confirm pregnancy is outside the uterus, but if they drop this would diagnosis a failed intrauterine pregnancy. Patient inquired about the possibility of this being a normal pregnancy now that levels were increasing, and was advised that the plateued levels would not be seen in a normal early pregnancy, and this is either an abnormal interuterine pregnancy or pregnancy outside the uterus. \par \par Patient was presenting today with her aunt and cousin for support. She expressed anxiousness regarding her diagnosis and not being able to be provided with definitive information. I knowledged the challenge of managing and early pregnancy of unknown location - and offered support and reassurance that we would be able to identify next steps more clearly now that we have more information and the benefit of some additional time passing. \par \par LMP 23 \par \par 23 HCG  74.2\par 23 HCG 70.0 \par 23 HCG 72.0 \par 23 Cytotec administration\par 23 186.6 \par 23 224.6 \par \par Other Labs: \par 23 11.1>14.1/45.0<354\par Cr 0.7  AST/ALT: 15/31\par O Neg  s/p rhoGam 23\par \par ObHx  \par PMH: PCOS, Asthma, DM, Obesity \par Meds: Ozempic

## 2023-07-21 NOTE — PHYSICAL EXAM
[Alert] : alert [Appropriately responsive] : appropriately responsive [No Acute Distress] : no acute distress [Soft] : soft [Non-tender] : non-tender [Oriented x3] : oriented x3 [No Lesions] : no lesions

## 2023-07-21 NOTE — PLAN
[FreeTextEntry1] : 33yo  with ectopic pregnancy identified in left adnexa. \par \par BSA 2.0 = Methotrexate 100mg administered IM \par \par Day #1  HCG  drawn today \par Day #4  23\par Day #7 23 \par - Patient will complete labs at [facility] \par - She understands she will follow HCG value down to zero \par - counseled that conservative recommendations are to avoid pregnancy for 3 months following MTX administration \par \par Patient is scheduled with f/u with Dr. Richey 23\par \par Return to family planning clinic as needed\par - Patient provided with copy of methotrexate information form \par - Patient has provider work cell phone info for any questions or concerns \par - Emergency precautions reviewed

## 2023-07-21 NOTE — PLAN
[FreeTextEntry1] : TAUS: thin endometrial stripe, no obvious IUP \par TVUS: normal looking right adnexa, left with cystic stucture, no definitive IUP or EUP visualize but not excluded on US\par \par A/P: 33yo  with pregnancy of unknown location with adnexal structure suspicious for possible ectopic pregnancy. \par \par Patient was counseled on recommendation for official ultrasound. She accepted referral and options for awaiting until availability today (could be a few hours before able to see her today) vs returning first thing in the AM as first US of the day tomorrow, or presenting to the ED now. \par \par After discussing risks, benefits, and alternatives she decided to present to office tomorrow for US first thing tomorrow. Patient informed that she would need labs tomorrow regardless of US results, and may need to present to the lab depending on staff availability, but  may be able to avoid ED Visit through communication and outpatient follow up. We discussed that if ectopic is visualized, we will then definitively know where the pregnancy is and can discuss management at that time of methotrexate vs surgery, and if pregnancy is not definitively visualized, would discussed management options at that time time. \par \par She endorsed understanding and agrees to maintain ectopic and bleeding precautions. \par \par RTC tomorrow for US, labs, management options

## 2023-07-21 NOTE — HISTORY OF PRESENT ILLNESS
[FreeTextEntry1] : Patient presenting today for follow up for management of ectopic pregnancy. \par \par Ultrasound performed today identified ectopic pregnancy in Left adnexa. I presented to discuss diagnosis and  management options with patient. Patient again presenting with her aunt for support. \par \par We reviewed diagnosis, and methotrexate management. We reviewed risks and side effect of methotrexate as as well as benefits of this management option compared to surgery. We reviewed that this option minimizes but does not eliminate the possibility of surgery and ectopic precautions are still to be maintained. Patient agreed today for methotrexate management, and follow instructions were reviewed. \par \par

## 2023-07-24 ENCOUNTER — OUTPATIENT (OUTPATIENT)
Dept: OUTPATIENT SERVICES | Facility: HOSPITAL | Age: 33
LOS: 1 days | End: 2023-07-24
Payer: MEDICAID

## 2023-07-24 DIAGNOSIS — O00.90 UNSPECIFIED ECTOPIC PREGNANCY WITHOUT INTRAUTERINE PREGNANCY: ICD-10-CM

## 2023-07-24 DIAGNOSIS — O36.80X0 PREGNANCY WITH INCONCLUSIVE FETAL VIABILITY, NOT APPLICABLE OR UNSPECIFIED: ICD-10-CM

## 2023-07-24 PROCEDURE — 84702 CHORIONIC GONADOTROPIN TEST: CPT

## 2023-07-25 DIAGNOSIS — O00.90 UNSPECIFIED ECTOPIC PREGNANCY WITHOUT INTRAUTERINE PREGNANCY: ICD-10-CM

## 2023-07-25 LAB — HCG SERPL-MCNC: 308.7 MIU/ML

## 2023-07-27 ENCOUNTER — APPOINTMENT (OUTPATIENT)
Dept: OBGYN | Facility: CLINIC | Age: 33
End: 2023-07-27
Payer: MEDICAID

## 2023-07-27 VITALS
SYSTOLIC BLOOD PRESSURE: 120 MMHG | HEIGHT: 64 IN | BODY MASS INDEX: 36.19 KG/M2 | DIASTOLIC BLOOD PRESSURE: 68 MMHG | WEIGHT: 212 LBS

## 2023-07-27 PROCEDURE — 99214 OFFICE O/P EST MOD 30 MIN: CPT | Mod: TH

## 2023-07-27 NOTE — PLAN
[FreeTextEntry1] : abnormal IUP vs Ectopic\par S/P MTX on 7/20\par She also received misorpostol in the ED on either 7/11 or 7/14.\par She is currently clinically stable.\par \par REpeat bhcg today\par ectopic precautions given.\par f/u 1 week.\par 
- - -

## 2023-07-27 NOTE — HISTORY OF PRESENT ILLNESS
[FreeTextEntry1] : 32 p0 here for for abnormal IUP vs Ectopic pregnancy. She was seen several times in the ER and also at [facility].\par She initially c/o cramping and bleeding that was consistent with a possible miscarriage. Subsequently, her bhcgs slightly increased and there was a suspicious finding on sonography for possible ectopic.\par \par 7/11 - 74\par 7/14 - 72\par 7/18 - 186\par 7/19 - 224\par 7/21 - 243\par 7/25 - 308\par \par Patient received MTX on 7/20.\par \par Patient currently feels ok. She is not having any pain and is having brownish discharge.\par \par

## 2023-07-28 DIAGNOSIS — O00.109 UNSPECIFIED TUBAL PREGNANCY WITHOUT INTRAUTERINE PREGNANCY: ICD-10-CM

## 2023-07-28 NOTE — CHART NOTE - NSCHARTNOTEFT_GEN_A_CORE
PGY2 Note    Called patient to check in on her after taking the cytotec and to remind her to do the repeat hcg. Patient did not answer the phone. Voicemail left with callbakc number.    Dr. Jaramillo and Dr. Chandler to be aware
PGY2 Note    Called patient to discuss raising bhcg. She reports that she took the cytotec Sunday and has been having cramping and vaginal bleeding since Monday afternoon. Denies abdominal pain, nausea, vomiting. Discussed options for the patient, including repeating lab work today, presenting to the office for possible active management, and presenting to the emergency department for repeat imaging. Patient currently electing to repeat hcg today and present to office tomorrow to discuss her options and for possible MVA. Patient confirmed understanding. Callback number given.    Dr. Chandler aware
PGY2 Note    Called patient to inform her of increased bhcg from day4 to day7 after methotrexate. Discussed to patient that this means that she is not adequately treated for her suspected ectopic pregnancy. She denies abdominal pain, pelvic pain, vaginal bleeding. Endorses brown discharge. She will come to the emergency department to get a second dose of methotrexate. Black box doctor aware.    Dr. Richey aware
PGY2 Note    Attempted to call patient to discuss labwork and plan moving forward.  Patient did not answer, voicemail left with reminder and callback number [phone].    Dr. King and Dr. Chandler to be made aware.

## 2023-07-29 ENCOUNTER — EMERGENCY (EMERGENCY)
Facility: HOSPITAL | Age: 33
LOS: 0 days | Discharge: HOME | End: 2023-07-29
Attending: EMERGENCY MEDICINE
Payer: MEDICAID

## 2023-07-29 VITALS
DIASTOLIC BLOOD PRESSURE: 57 MMHG | HEART RATE: 73 BPM | TEMPERATURE: 99 F | RESPIRATION RATE: 18 BRPM | OXYGEN SATURATION: 100 % | SYSTOLIC BLOOD PRESSURE: 153 MMHG

## 2023-07-29 VITALS
DIASTOLIC BLOOD PRESSURE: 62 MMHG | RESPIRATION RATE: 20 BRPM | WEIGHT: 210.1 LBS | HEART RATE: 102 BPM | OXYGEN SATURATION: 98 % | HEIGHT: 63 IN | SYSTOLIC BLOOD PRESSURE: 117 MMHG | TEMPERATURE: 98 F

## 2023-07-29 DIAGNOSIS — O00.90 UNSPECIFIED ECTOPIC PREGNANCY WITHOUT INTRAUTERINE PREGNANCY: ICD-10-CM

## 2023-07-29 DIAGNOSIS — O26.851 SPOTTING COMPLICATING PREGNANCY, FIRST TRIMESTER: ICD-10-CM

## 2023-07-29 DIAGNOSIS — O99.511 DISEASES OF THE RESPIRATORY SYSTEM COMPLICATING PREGNANCY, FIRST TRIMESTER: ICD-10-CM

## 2023-07-29 DIAGNOSIS — O99.281 ENDOCRINE, NUTRITIONAL AND METABOLIC DISEASES COMPLICATING PREGNANCY, FIRST TRIMESTER: ICD-10-CM

## 2023-07-29 DIAGNOSIS — J45.909 UNSPECIFIED ASTHMA, UNCOMPLICATED: ICD-10-CM

## 2023-07-29 DIAGNOSIS — E28.2 POLYCYSTIC OVARIAN SYNDROME: ICD-10-CM

## 2023-07-29 DIAGNOSIS — O24.911 UNSPECIFIED DIABETES MELLITUS IN PREGNANCY, FIRST TRIMESTER: ICD-10-CM

## 2023-07-29 LAB
ALBUMIN SERPL ELPH-MCNC: 4.7 G/DL — SIGNIFICANT CHANGE UP (ref 3.5–5.2)
ALLERGY+IMMUNOLOGY DIAG STUDY NOTE: SIGNIFICANT CHANGE UP
ALP SERPL-CCNC: 98 U/L — SIGNIFICANT CHANGE UP (ref 30–115)
ALT FLD-CCNC: 48 U/L — HIGH (ref 0–41)
ANION GAP SERPL CALC-SCNC: 13 MMOL/L — SIGNIFICANT CHANGE UP (ref 7–14)
APPEARANCE UR: CLEAR — SIGNIFICANT CHANGE UP
AST SERPL-CCNC: 20 U/L — SIGNIFICANT CHANGE UP (ref 0–41)
BACTERIA # UR AUTO: NEGATIVE — SIGNIFICANT CHANGE UP
BASOPHILS # BLD AUTO: 0.05 K/UL — SIGNIFICANT CHANGE UP (ref 0–0.2)
BASOPHILS NFR BLD AUTO: 0.6 % — SIGNIFICANT CHANGE UP (ref 0–1)
BILIRUB SERPL-MCNC: 0.3 MG/DL — SIGNIFICANT CHANGE UP (ref 0.2–1.2)
BILIRUB UR-MCNC: NEGATIVE — SIGNIFICANT CHANGE UP
BLD GP AB SCN SERPL QL: SIGNIFICANT CHANGE UP
BUN SERPL-MCNC: 20 MG/DL — SIGNIFICANT CHANGE UP (ref 10–20)
CALCIUM SERPL-MCNC: 10.2 MG/DL — SIGNIFICANT CHANGE UP (ref 8.4–10.5)
CHLORIDE SERPL-SCNC: 101 MMOL/L — SIGNIFICANT CHANGE UP (ref 98–110)
CO2 SERPL-SCNC: 23 MMOL/L — SIGNIFICANT CHANGE UP (ref 17–32)
COLOR SPEC: YELLOW — SIGNIFICANT CHANGE UP
CREAT SERPL-MCNC: 0.6 MG/DL — LOW (ref 0.7–1.5)
DIFF PNL FLD: ABNORMAL
DIR ANTIGLOB POLYSPECIFIC INTERPRETATION: SIGNIFICANT CHANGE UP
EGFR: 121 ML/MIN/1.73M2 — SIGNIFICANT CHANGE UP
EOSINOPHIL # BLD AUTO: 0.12 K/UL — SIGNIFICANT CHANGE UP (ref 0–0.7)
EOSINOPHIL NFR BLD AUTO: 1.4 % — SIGNIFICANT CHANGE UP (ref 0–8)
EPI CELLS # UR: 2 /HPF — SIGNIFICANT CHANGE UP (ref 0–5)
GLUCOSE SERPL-MCNC: 181 MG/DL — HIGH (ref 70–99)
GLUCOSE UR QL: ABNORMAL
HCG SERPL-ACNC: 272.5 MIU/ML — HIGH
HCT VFR BLD CALC: 45.5 % — SIGNIFICANT CHANGE UP (ref 37–47)
HGB BLD-MCNC: 14.7 G/DL — SIGNIFICANT CHANGE UP (ref 12–16)
HYALINE CASTS # UR AUTO: 1 /LPF — SIGNIFICANT CHANGE UP (ref 0–7)
IMM GRANULOCYTES NFR BLD AUTO: 0.6 % — HIGH (ref 0.1–0.3)
KETONES UR-MCNC: ABNORMAL
LEUKOCYTE ESTERASE UR-ACNC: NEGATIVE — SIGNIFICANT CHANGE UP
LYMPHOCYTES # BLD AUTO: 1.83 K/UL — SIGNIFICANT CHANGE UP (ref 1.2–3.4)
LYMPHOCYTES # BLD AUTO: 20.9 % — SIGNIFICANT CHANGE UP (ref 20.5–51.1)
MCHC RBC-ENTMCNC: 26.4 PG — LOW (ref 27–31)
MCHC RBC-ENTMCNC: 32.3 G/DL — SIGNIFICANT CHANGE UP (ref 32–37)
MCV RBC AUTO: 81.7 FL — SIGNIFICANT CHANGE UP (ref 81–99)
MONOCYTES # BLD AUTO: 0.5 K/UL — SIGNIFICANT CHANGE UP (ref 0.1–0.6)
MONOCYTES NFR BLD AUTO: 5.7 % — SIGNIFICANT CHANGE UP (ref 1.7–9.3)
NEUTROPHILS # BLD AUTO: 6.21 K/UL — SIGNIFICANT CHANGE UP (ref 1.4–6.5)
NEUTROPHILS NFR BLD AUTO: 70.8 % — SIGNIFICANT CHANGE UP (ref 42.2–75.2)
NITRITE UR-MCNC: NEGATIVE — SIGNIFICANT CHANGE UP
NRBC # BLD: 0 /100 WBCS — SIGNIFICANT CHANGE UP (ref 0–0)
PH UR: 6 — SIGNIFICANT CHANGE UP (ref 5–8)
PLATELET # BLD AUTO: 333 K/UL — SIGNIFICANT CHANGE UP (ref 130–400)
PMV BLD: 9.4 FL — SIGNIFICANT CHANGE UP (ref 7.4–10.4)
POTASSIUM SERPL-MCNC: 4.2 MMOL/L — SIGNIFICANT CHANGE UP (ref 3.5–5)
POTASSIUM SERPL-SCNC: 4.2 MMOL/L — SIGNIFICANT CHANGE UP (ref 3.5–5)
PROT SERPL-MCNC: 7.4 G/DL — SIGNIFICANT CHANGE UP (ref 6–8)
PROT UR-MCNC: SIGNIFICANT CHANGE UP
RBC # BLD: 5.57 M/UL — HIGH (ref 4.2–5.4)
RBC # FLD: 15.2 % — HIGH (ref 11.5–14.5)
RBC CASTS # UR COMP ASSIST: 5 /HPF — HIGH (ref 0–4)
SODIUM SERPL-SCNC: 137 MMOL/L — SIGNIFICANT CHANGE UP (ref 135–146)
SP GR SPEC: 1.04 — HIGH (ref 1.01–1.03)
UROBILINOGEN FLD QL: SIGNIFICANT CHANGE UP
WBC # BLD: 8.76 K/UL — SIGNIFICANT CHANGE UP (ref 4.8–10.8)
WBC # FLD AUTO: 8.76 K/UL — SIGNIFICANT CHANGE UP (ref 4.8–10.8)
WBC UR QL: 3 /HPF — SIGNIFICANT CHANGE UP (ref 0–5)

## 2023-07-29 PROCEDURE — 86077 PHYS BLOOD BANK SERV XMATCH: CPT

## 2023-07-29 PROCEDURE — 99285 EMERGENCY DEPT VISIT HI MDM: CPT

## 2023-07-29 PROCEDURE — 86900 BLOOD TYPING SEROLOGIC ABO: CPT

## 2023-07-29 PROCEDURE — 81001 URINALYSIS AUTO W/SCOPE: CPT

## 2023-07-29 PROCEDURE — 85025 COMPLETE CBC W/AUTO DIFF WBC: CPT

## 2023-07-29 PROCEDURE — 80053 COMPREHEN METABOLIC PANEL: CPT

## 2023-07-29 PROCEDURE — 76830 TRANSVAGINAL US NON-OB: CPT

## 2023-07-29 PROCEDURE — 76830 TRANSVAGINAL US NON-OB: CPT | Mod: 26

## 2023-07-29 PROCEDURE — 86901 BLOOD TYPING SEROLOGIC RH(D): CPT

## 2023-07-29 PROCEDURE — 86850 RBC ANTIBODY SCREEN: CPT

## 2023-07-29 PROCEDURE — 86880 COOMBS TEST DIRECT: CPT

## 2023-07-29 PROCEDURE — 84702 CHORIONIC GONADOTROPIN TEST: CPT

## 2023-07-29 PROCEDURE — 86870 RBC ANTIBODY IDENTIFICATION: CPT

## 2023-07-29 PROCEDURE — 99284 EMERGENCY DEPT VISIT MOD MDM: CPT | Mod: 25

## 2023-07-29 PROCEDURE — 36415 COLL VENOUS BLD VENIPUNCTURE: CPT

## 2023-07-29 PROCEDURE — 96372 THER/PROPH/DIAG INJ SC/IM: CPT

## 2023-07-29 RX ORDER — METHOTREXATE 2.5 MG/1
100 TABLET ORAL ONCE
Refills: 0 | Status: COMPLETED | OUTPATIENT
Start: 2023-07-29 | End: 2023-07-29

## 2023-07-29 RX ORDER — SODIUM CHLORIDE 9 MG/ML
1000 INJECTION INTRAMUSCULAR; INTRAVENOUS; SUBCUTANEOUS ONCE
Refills: 0 | Status: COMPLETED | OUTPATIENT
Start: 2023-07-29 | End: 2023-07-29

## 2023-07-29 RX ADMIN — METHOTREXATE 100 MILLIGRAM(S): 2.5 TABLET ORAL at 18:55

## 2023-07-29 RX ADMIN — SODIUM CHLORIDE 1000 MILLILITER(S): 9 INJECTION INTRAMUSCULAR; INTRAVENOUS; SUBCUTANEOUS at 16:28

## 2023-07-29 NOTE — ED PROVIDER NOTE - PATIENT PORTAL LINK FT
You can access the FollowMyHealth Patient Portal offered by [facility] by registering at the following website: [email or web address]. By joining [facility]’s FollowMyHealth portal, you will also be able to view your health information using other applications (apps) compatible with our system.

## 2023-07-29 NOTE — ED PROVIDER NOTE - PHYSICAL EXAMINATION
Vital Signs: I have reviewed the initial vital signs.  Constitutional: NAD, well-nourished, appears stated age, no acute distress.  HEENT: Airway patent, moist MM, no erythema/swelling/deformity of oral structures. EOMI, PERRLA.  CV: regular rate, regular rhythm, well-perfused extremities, 2+ b/l DP and radial pulses equal.  Lungs: BCTA, no increased WOB.  ABD: NT, ND, no guarding or rebound, no pulsatile mass, no hernias.   MSK: Neck supple, nontender, nl ROM, no stepoff. Chest nontender. Back nontender. Ext nontender, nl rom, no deformity.   INTEG: Skin warm, dry, no rash.  NEURO: A&Ox3, normal strength, nl sensation throughout, normal speech.

## 2023-07-29 NOTE — PROGRESS NOTE ADULT - ASSESSMENT
31 yo  s/p MTX on  for presumed ectopic pregnancy, with abnormal bhcg levels,  clinically and hemodynamically stable    - Discussed u/s and beta hcg findings with patient. Discussed that despite the drop in beta hcg today, the day 7 rise was an abnormal response to the MTX administration.  Since today's beta showed a 25% decrease proposed continuing to trend beta hcg however patient opted for second dose of MTX.    - Patient counseled on methotrexate therapy as treatment for ectopic pregnancy.  Common side effects of the medication as well as restrictions while on the medication were reviewed with patient and patient signed methotrexate information sheet that was placed in her chart.    - Consents for methotrexate signed with patient with attending at bedside.    - Chemotherapy drug order form sent to pharmacy with methotrexate dose calculated based on BSA for patient.   - Patient given strict precautions to call her physician or return to ED if she experiences any severe abdominal pain, dizziness, lightheadedness, severe n/v, or any heavy vaginal bleeding >2 pads/hour for >2 hours.    - Patient instructed on need for follow up of b-hcg on day 4 and day 7 of methotrexate therapy.  Patient intends to follow up in GYN office on  (Day 4) and  (Day 7)  - Once patient receives methotrexate therapy she is cleared for discharge from OBGYN perspective.  Primary managment per ED team.   - Dispo per ED    Dr. Rojas and Dr. Sneha vasquez

## 2023-07-29 NOTE — ED PROVIDER NOTE - OBJECTIVE STATEMENT
32-year-old female with history of diabetes, asthma, PCOS diagnosed with ectopic pregnancy and treated with methotrexate on 7/21.  Patient has been trending bHCG and told by GYN to return to the ED for elevated lab.  Patient states has been having some light vaginal spotting.  Patient denies any abdominal pain, nausea, vomiting, diarrhea, fever, chills or pain

## 2023-07-29 NOTE — ED ADULT NURSE NOTE - NSFALLUNIVINTERV_ED_ALL_ED
Bed/Stretcher in lowest position, wheels locked, appropriate side rails in place/Call bell, personal items and telephone in reach/Instruct patient to call for assistance before getting out of bed/chair/stretcher/Non-slip footwear applied when patient is off stretcher/[place] to call system/Physically safe environment - no spills, clutter or unnecessary equipment/Purposeful proactive rounding/Room/bathroom lighting operational, light cord in reach

## 2023-07-29 NOTE — ED ADULT TRIAGE NOTE - CHIEF COMPLAINT QUOTE
dx with ectopic pregnancy, was given methotrexate, told to come back by dr benito. HCG levels are increasing

## 2023-07-29 NOTE — PROGRESS NOTE ADULT - SUBJECTIVE AND OBJECTIVE BOX
Chief Complaint: mgmt of ectopic pregnancy    HPI: Patient is well known to gyn service for ectopic pregnancy. Patient called into ER for evaluation by GYN team abnormal beta hcg after administration of MTX on  with a rise for day 7 from bhcg 243(day 1) to 365.3 (day 7). Patient states she is having light bleeding, less than 1 pad per day. Denies any abdominal pain or cramping   Denies dizziness, lightheadedness SOB or chest pain. No other complaints. Denies nausea, vomiting, diarrhea, constipation, dysuria or urinary urgency. Pregnancy is highly desired.     Vital Signs Last 24 Hrs  T(F): 98.6 (2023 16:04), Max: 98.6 (2023 16:04)  HR: 73 (2023 16:04) (73 - 102)  BP: 153/57 (2023 16:04) (117/62 - 153/57)  RR: 18 (2023 16:04) (18 - 20)  Height (cm): 160 (23 @ 13:01)  Weight (kg): 95.3 (23 @ 13:01)  BMI (kg/m2): 37.2 (23 @ 13:01)  BSA (m2): 1.97 (23 @ 13:01)    General Appearance - AAOx3, NAD  Abdomen - Soft, nontender, nondistended, no rebound, no rigidity, no guarding, bowel sounds present    Meds:   sodium chloride 0.9% Bolus 1000 milliLiter(s) IV Bolus once    Height (cm): 160 (23 @ 13:01)  Weight (kg): 95.3 (23 @ 13:01)  BMI (kg/m2): 37.2 (23 @ 13:01)  BSA (m2): 1.97 (23 @ 13:01)    LABS:                        14.7   8.76  )-----------( 333      ( 2023 14:48 )             45.5     HCG Quantitative, Serum: 272.5 mIU/mL (23 @ 14:48)    ABO RH Interpretation: O NEG (23 @ 14:48)  Antibody Screen: POS [2023 17:21  LIZETMELANI  spoke to Dr. Prieto] (23 @ 14:48)     bhcg 243   bhcg 304.7 (day 4)   365.3 (day 7)    8.76>14.7/45.5<333, 137/4.2/101/23/20/0.6<181, AST/ALT , bHCG 272.5 (25% decrease)         137  |  101  |  20  ----------------------------<  181<H>  4.2   |  23  |  0.6<L>    Ca    10.2      2023 14:48    TPro  7.4  /  Alb  4.7  /  TBili  0.3  /  DBili  x   /  AST  20  /  ALT  48<H>  /  AlkPhos  98        Urinalysis Basic - ( 2023 14:48 )    Color: Yellow / Appearance: Clear / S.045 / pH: x  Gluc: 181 mg/dL / Ketone: Small  / Bili: Negative / Urobili: <2 mg/dL   Blood: x / Protein: Trace / Nitrite: Negative   Leuk Esterase: Negative / RBC: 5 /HPF / WBC 3 /HPF   Sq Epi: x / Non Sq Epi: x / Bacteria: Negative    RADIOLOGY & ADDITIONAL STUDIES:  < from: US Transvaginal (23 @ 17:52) >  ACC: [id number] EXAM:  US TRANSVAGINAL   ORDERED BY: LINDA PRIETO     PROCEDURE DATE:  2023          INTERPRETATION:  CLINICAL INFORMATION: History of PCO is, diagnosed with   ectopic pregnancy at outside facility and treated with methotrexateon   2023. Beta hCG 272 mIU/mL today, previously 365 mIU/mL on 2023.    PROCEDURE: Ultrasound examination of the pelvis was performed   transabdominally and transvaginally. Transvaginal examination was   performed to better evaluate the uterine contents.    COMPARISON: Transabdominal and transvaginal pelvic sonogram 2023    FINDINGS:    UTERUS: No evidence of an intrauterine gestational sac. The endometrium   measures 5mm in maximum diameter. No hypervascularity was identified   within the uterus on color Doppler.    RIGHT OVARY/ADNEXA: The right ovary is unremarkable. No adnexal mass.    Ovarian size: 2.2 x 1.7 x 1.6 cm    LEFT OVARY/ADNEXA: The left ovary is unremarkable. No adnexal masses.    Ovarian size: 2.6 x 1.8 x 1.7 cm    FLUID: There is no abnormal free fluid in the cul-de-sac.    IMPRESSION:    No intrauterine gestation is seen and no adnexal mass is evident. These   findings represent a pregnancy of unknown location. The sonographic   differential diagnosis includes: an intrauterine gestation too early to   visualize, a spontaneous , or an occult ectopic pregnancy.    Continued close clinical follow-up, serial serum beta-HCG levels and   short term sonographic follow up is recommended.    --- End of Report ---          FANNY WIGGINS MD; Resident Radiologist  This document has been electronically signed.  ASIF TAVERAS MD; Attending Radiologist  This document has been electronically signed. 2023  6:08PM    < end of copied text >

## 2023-07-29 NOTE — ED PROVIDER NOTE - CLINICAL SUMMARY MEDICAL DECISION MAKING FREE TEXT BOX
Patient presenting with worsening higher trending beta hCG, sent by GYN for evaluation as well as persistent vaginal spotting.  Patient was diagnosed recently with ectopic pregnancy and treated with methotrexate on 7/21 as documented.  Otherwise on arrival patient afebrile, hemodynamically stable, abdomen completely nontender, very well-appearing.  Obtained labs which were grossly unremarkable including no significant leukocytosis, anemia, signs of dehydration/JACKIE, transaminitis or significant electrolyte abnormalities. Pelvic US confirmed that there is likely pregnancy of unknown location, but no other emergent pathologies and UA negative for infection.  Consulted GYN who evaluated the patient in the ED and recommended a subsequent dose of methotrexate and discharged home with close outpatient follow-up.  Patient agreeable with plan. Agrees to return to ED for any new or worsening symptoms.

## 2023-07-29 NOTE — ED ADULT NURSE NOTE - OBJECTIVE STATEMENT
Pt presenting to the ED after having abnormal beta Hcg after receiving methotrexate on 7/21. pt states she is having light vaginal bleeding. Denies Chest pain, SOB, Dizziness

## 2023-07-29 NOTE — ED PROVIDER NOTE - CARE PROVIDER_API CALL
Donis Richey  Obstetrics and Gynecology  [address] [place] [id number]  Phone: [phone]  Fax: [phone]  Follow Up Time:

## 2023-07-29 NOTE — ED PROVIDER NOTE - NSFOLLOWUPINSTRUCTIONS_ED_ALL_ED_FT
Ectopic Pregnancy    WHAT YOU NEED TO KNOW:    Ectopic pregnancy occurs when a fertilized egg attaches and begins to grow outside of the uterus. The most common place for this to happen is in the fallopian tube. This is sometimes called a tubal pregnancy. The egg can also implant on the outside of the uterus, on the ovary or cervix, or in the abdomen. The egg may begin to grow, but the pregnancy cannot continue normally. Ectopic pregnancy can cause heavy bleeding and may be life-threatening.Ectopic Pregnancy         DISCHARGE INSTRUCTIONS:    Call your local emergency number (911 in the [place]) if:     You have chest pain or trouble breathing.        Call your doctor if:     You have sharp pain in your lower abdomen that is severe and starts suddenly.      You feel lightheaded or like you are going to faint.      You have increasing abdominal or pelvic pain or heavy vaginal bleeding.      You have shoulder pain.      You have a fever.      You have questions or concerns about your condition or care.    Medicines: You may need any of the following:     Prescription pain medicine may be given. Ask your healthcare provider how to take this medicine safely. Some prescription pain medicines contain acetaminophen. Do not take other medicines that contain acetaminophen without talking to your healthcare provider. Too much acetaminophen may cause liver damage. Prescription pain medicine may cause constipation. Ask your healthcare provider how to prevent or treat constipation.       Acetaminophen decreases pain and fever. It is available without a doctor's order. Ask how much to take and how often to take it. Follow directions. Read the labels of all other medicines you are using to see if they also contain acetaminophen, or ask your doctor or pharmacist. Acetaminophen can cause liver damage if not taken correctly. Do not use more than 4 grams (4,000 milligrams) total of acetaminophen in one day.       Take your medicine as directed. Contact your healthcare provider if you think your medicine is not helping or if you have side effects. Tell him or her if you are allergic to any medicine. Keep a list of the medicines, vitamins, and herbs you take. Include the amounts, and when and why you take them. Bring the list or the pill bottles to follow-up visits. Carry your medicine list with you in case of an emergency.    If you received methotrexate:     Do not have sex, and limit physical activity. Heavy physical activity or sexual intercourse may cause the ectopic pregnancy to rupture. This can be life-threatening. Your healthcare provider will tell you when it is okay to have sex and return to your normal activities.      Do not get pregnant until your healthcare provider says it is okay. Methotrexate will be harmful to an unborn baby. You will need to wait until at least 1 monthly cycle after you finish the methotrexate course to get pregnant. Your provider may want you to wait until after you have had 3 monthly cycles. This will help make sure all the methotrexate is out of your body.      Avoid folic acid and NSAID medicines. Folic acid, and NSAIDs, such as ibuprofen, prevent methotrexate from working correctly. Do not take folic acid supplements or have foods that are high in folic acid. Examples include orange juice, breakfast cereal, and leafy green vegetables. Your healthcare provider can give you more information on foods to avoid.      You may have some spotting and abdominal pain. This may start a few days after you begin taking methotrexate. These are normal and should only last a short time. Talk to your healthcare provider if you have any concerns.      Limit sunlight exposure. Sunlight can cause a condition caused methotrexate dermatitis (skin irritation).    For support and more information:     The [employer]  [address] [address]  [place] [address]  Phone: [phone]  Phone: [phone]  Web Address: http://www.acog.org      Follow up with your gynecologist as directed: You may need to return for a follow-up exam, treatment, or blood tests. If you received methotrexate to stop your pregnancy, it is important to come in for follow-up tests. Write down your questions so you remember to ask them during your visits.

## 2023-07-29 NOTE — ED PROVIDER NOTE - DIFFERENTIAL DIAGNOSIS
Elevated hcg, vaginal spotting r/o ectopic vs retained products/spontaneous  vs other emergent GYN pathology vs UTI. Differential Diagnosis

## 2023-07-29 NOTE — ED ADULT TRIAGE NOTE - ESI TRIAGE ACUITY LEVEL, MLM
[de-identified] : 3

## 2023-07-30 LAB — HCG SERPL-MCNC: 365.3 MIU/ML

## 2023-08-02 LAB
ALBUMIN SERPL ELPH-MCNC: 4.4 G/DL
ALP BLD-CCNC: 79 U/L
ALT SERPL-CCNC: 28 U/L
ANION GAP SERPL CALC-SCNC: 15 MMOL/L
AST SERPL-CCNC: 11 U/L
BILIRUB SERPL-MCNC: 0.3 MG/DL
BUN SERPL-MCNC: 15 MG/DL
CALCIUM SERPL-MCNC: 9.3 MG/DL
CHLORIDE SERPL-SCNC: 101 MMOL/L
CO2 SERPL-SCNC: 23 MMOL/L
CREAT SERPL-MCNC: 0.7 MG/DL
EGFR: 116 ML/MIN/1.73M2
GLUCOSE SERPL-MCNC: 188 MG/DL
HCG SERPL-MCNC: 211 MIU/ML
POTASSIUM SERPL-SCNC: 4.3 MMOL/L
PROT SERPL-MCNC: 6.9 G/DL
SODIUM SERPL-SCNC: 139 MMOL/L

## 2023-08-03 ENCOUNTER — APPOINTMENT (OUTPATIENT)
Dept: OBGYN | Facility: CLINIC | Age: 33
End: 2023-08-03
Payer: MEDICAID

## 2023-08-03 PROCEDURE — 99213 OFFICE O/P EST LOW 20 MIN: CPT | Mod: TH

## 2023-08-05 NOTE — HISTORY OF PRESENT ILLNESS
[FreeTextEntry1] : Patient here for f/u of treatment of her suspected ectopic pregnancy. She is feeling well. No pain. Minimal spotting.  Patient received MTX on 7/20. Her HCG levels did not drop appropriately so she received a second dose of MTX on Saturday July 29th.  Her day 4 hcg level has dropped. She is scheduled for her day 7 labs soon.

## 2023-08-05 NOTE — PLAN
[FreeTextEntry1] : Patient counselled. Ectopic precautions given Repeat hcg levels refer to urogyn for urinary incontinence. f/u 1 week.

## 2023-08-09 LAB — HCG SERPL-MCNC: 32.7 MIU/ML

## 2023-08-10 ENCOUNTER — APPOINTMENT (OUTPATIENT)
Dept: OBGYN | Facility: CLINIC | Age: 33
End: 2023-08-10
Payer: MEDICAID

## 2023-08-10 VITALS
DIASTOLIC BLOOD PRESSURE: 76 MMHG | SYSTOLIC BLOOD PRESSURE: 116 MMHG | BODY MASS INDEX: 36.19 KG/M2 | HEIGHT: 64 IN | WEIGHT: 212 LBS

## 2023-08-10 PROCEDURE — 99213 OFFICE O/P EST LOW 20 MIN: CPT | Mod: TH

## 2023-08-10 NOTE — PLAN
[FreeTextEntry1] : Ectopic pregnancy responsing to second dose of MTX Patient counselled. F/U 2 weeks Repeat HCG weekly on monday.  pt got up and walked with her platform walker, follow up with pmd, return if any symptoms worsen

## 2023-08-10 NOTE — PHYSICAL EXAM
[Appropriately responsive] : appropriately responsive [Alert] : alert [No Acute Distress] : no acute distress [Labia Majora] : normal [Labia Minora] : normal [Scant] : There was scant vaginal bleeding [Normal] : normal [Uterine Adnexae] : normal

## 2023-08-10 NOTE — HISTORY OF PRESENT ILLNESS
[FreeTextEntry1] : Here for f/u after medical management of ectopic with mtx. Patient required 2 doses of MTX. Last dose was 7/20 Day 4 hcg 211 Day 7 hcg  32.7 (on aug 7). Patient reports no pain. Her vaginal bleeding/spotting has slightly increased. It is dark red. No clots.

## 2023-08-17 LAB — HCG SERPL-MCNC: 15.6 MIU/ML

## 2023-08-24 ENCOUNTER — APPOINTMENT (OUTPATIENT)
Dept: OBGYN | Facility: CLINIC | Age: 33
End: 2023-08-24
Payer: MEDICAID

## 2023-08-24 VITALS — DIASTOLIC BLOOD PRESSURE: 80 MMHG | SYSTOLIC BLOOD PRESSURE: 120 MMHG | WEIGHT: 211 LBS | BODY MASS INDEX: 36.22 KG/M2

## 2023-08-24 DIAGNOSIS — N97.9 FEMALE INFERTILITY, UNSPECIFIED: ICD-10-CM

## 2023-08-24 DIAGNOSIS — O36.80X0 PREGNANCY WITH INCONCLUSIVE FETAL VIABILITY, NOT APPLICABLE OR UNSPECIFIED: ICD-10-CM

## 2023-08-24 PROCEDURE — 99213 OFFICE O/P EST LOW 20 MIN: CPT | Mod: TH

## 2023-08-24 RX ORDER — LETROZOLE TABLETS 2.5 MG/1
2.5 TABLET, FILM COATED ORAL DAILY
Qty: 5 | Refills: 6 | Status: ACTIVE | COMMUNITY
Start: 2023-04-06 | End: 1900-01-01

## 2023-08-24 NOTE — CHART NOTE - NSCHARTNOTESELECT_GEN_ALL_CORE
Event Note

## 2023-08-24 NOTE — CHART NOTE - NSCHARTNOTEFT_GEN_A_CORE
PGY2 GYN Contact Note    Called patient to remind to repeat hCG hormone today. No response, voicemail left with instructions to perform blood work today at any [facility] lab with call back information.    Dr. Chandler and Dr. Snowden to be made aware
PGY2 GYN Contact Note    Called patient to remind to repeat hCG hormone today. No response, voicemail left with instructions to perform blood work today at any [facility] lab with call back information.    Dr. Chang and Dr. Richey to be aware
PGY2 Note    Attempted to call patient as reminder to go to lab to repeat beta hcg.  Patient did not answer, voicemail left with reminder and callback number [phone].    Dr. Chandler to be made aware.
PGY2 Note    Attempted to call patient to see how she was feeling and to remind her to get repeat bhcg done after receiving methotrexate. Patient did not answer the phone. Voicemail left with callback number.    Dr. Rojas and Dr. Hoover to be aware
PGY2 Note    Discussed Day 4 (s/p MTX) labs with patient on phone, patient will go again on 8/4 for Day 7.  Currently asymptomatic.      Dr. Chandler to be made aware.
PGY2 Note    Spoke with patient on phone, currently at lab to repeat bhcg and CMP. Will follow results and inform patient.     Dr. Chandler to be made aware
PGY2 Note    Spoke with patient on phone, states she will go to lab today to repeat bhcg on Day 7.  Team to follow results. Patient currently asymptomatic.     Dr. Chandler to be made aware.
PGY2 Note    Spoke with patient on phone, states she will go to the lab today to repeat bhcg (day 7 s/p MTX), currently asymptomatic.  Team to follow results and inform patient.    Dr. Chandler to be made aware.
HPI from ED visit  () 33 yo  @ 5w5d by LMP, presents to ED for vaginal bleeding which started last night. She states that she saw blood when she wiped, has not saturated a full pad since then. She endorses some mild cramping on and off, 4/10 pain. She denies any severe abdominal pain, heavy vaginal bleeding, dizziness, lightheadedness, nausea, vomiting, chest pain, sob, dysuria. She took letrizole this cycle, prescribed by Dr. Richey her OBGYN. She has a history of TIIDM and asthma, no hospitalizations.    Labs showed:    11.20>14.2/45.1<340 139/3.9/102/23/14/0.6<140 AST/ALT 14/15 HCG 74.5 O neg     Imaging    TVUS FINDINGS: No intrauterine pregnancy is identified at this time. Findings may represent early pregnancy or pregnancy of unknown location. No adnexal   masses. Left ovarian hemorrhagic cyst measuring up to 1.9 cm.   TVUS Uterus: 5.7 cm x 3.5 cm x 4.2 cm. Within normal limits.  Endometrium: 6 mm. no intrauterine pregnancy seen.Right ovary: 2.2 cm x 1.2 cm x 2.3 cm. Within normal limits. Normal arterial and venous waveforms. Left ovary: 3.2 cm x 2.3 cm x 1.8 cm. Within normal limits. Normal arterial and venous waveforms.Fluid: Trace.    She was assessed as:   33 yo  @ 5w5d by LMP, vaginal bleeding, pregnancy of unknown location, likely early pregnancy loss low suspicion for ectopic, clinically and hemodynamically stable    Instructed to obtained serial b-hCGs.      bhcg 70    11.68>13.8/43.9<340, 140/4.3/102/25/17/0.8<214, AST/ALT , O neg, hcg 72   hcg 186   hcg 224.6 (20% increase, 1 day)   bhcg 243  Showed a plateauing beta hcg; Patient received MTX on  for presumed ectopic pregnancy.    bhcg 304.7 (day 4)   365.3 (day 7)    8.76>14.7/45.5<333, 137/4.2/101/23/20/0.6<181, AST/ALT , bHCG 272.5 (25% decrease)   bhcg continued to plateau and another dose of MTX was recommended.   MTX   100mg administered      bhcg 211 (day 4) 139/4.3/101/15/0.7<188, AST/ALT  bhcg 32.7 (total 84.5% decrease)   bhcg 15.6 (52% decrease)   bhcg <1    Patient is a 31yo  s/p MTX x2 for presumed ectopic pregnancy, now with negative bhcg.      Dr. Gage vasquez.
PGY2 Note    Attempted to call patient as reminder to go to lab to repeat beta hcg.  Patient did not answer, voicemail left with reminder and callback number [phone].   Dr. Chandler to be made aware.
PGY2 Note    Spoke with patient regarding appropriately dropping bhcg; states she is bleeding a little more, but still only 1 pad per day. Denies abdominal pain/cramping, nausea/vomiting.  Will repeat bhcg on 8/14.      Dr. Richey to be made aware.

## 2023-09-01 PROBLEM — N97.9 FEMALE INFERTILITY: Status: ACTIVE | Noted: 2023-02-09

## 2023-09-01 NOTE — PLAN
[FreeTextEntry1] : Patient counselled. Will try to get pregnant after her next cycle. Given her PCOS - she will require letrozole again. Valtrex prescribed for recurrent genital herpes.

## 2023-09-01 NOTE — HISTORY OF PRESENT ILLNESS
[FreeTextEntry1] : Patient here for f/u of her ectopic pregnancy that was treated with methotrexate.  Her HCG is now negative. She is no longer bleeding and is feeling fine.

## 2023-09-03 LAB — HCG SERPL-MCNC: <1 MIU/ML

## 2023-09-06 DIAGNOSIS — Z32.00 ENCOUNTER FOR PREGNANCY TEST, RESULT UNKNOWN: ICD-10-CM

## 2023-09-07 DIAGNOSIS — Z32.00 ENCOUNTER FOR PREGNANCY TEST, RESULT UNKNOWN: ICD-10-CM

## 2023-12-28 ENCOUNTER — APPOINTMENT (OUTPATIENT)
Dept: OBGYN | Facility: CLINIC | Age: 33
End: 2023-12-28
Payer: MEDICAID

## 2023-12-28 DIAGNOSIS — N76.6 ULCERATION OF VULVA: ICD-10-CM

## 2023-12-28 DIAGNOSIS — E11.9 TYPE 2 DIABETES MELLITUS W/OUT COMPLICATIONS: ICD-10-CM

## 2023-12-28 DIAGNOSIS — Z87.59 PERSONAL HISTORY OF OTHER COMPLICATIONS OF PREGNANCY, CHILDBIRTH AND THE PUERPERIUM: ICD-10-CM

## 2023-12-28 PROCEDURE — 99214 OFFICE O/P EST MOD 30 MIN: CPT | Mod: TH,25

## 2023-12-28 PROCEDURE — 76817 TRANSVAGINAL US OBSTETRIC: CPT

## 2023-12-28 RX ORDER — ALBUTEROL SULFATE 90 UG/1
108 (90 BASE) INHALANT RESPIRATORY (INHALATION)
Qty: 1 | Refills: 6 | Status: ACTIVE | COMMUNITY
Start: 2021-07-27 | End: 1900-01-01

## 2023-12-29 PROBLEM — E11.9 DIABETES: Status: ACTIVE | Noted: 2021-12-14

## 2023-12-29 LAB
ABO + RH PNL BLD: NORMAL
APPEARANCE: CLEAR
BILIRUBIN URINE: NEGATIVE
BLD GP AB SCN SERPL QL: NORMAL
BLOOD URINE: NEGATIVE
BV BACTERIA RRNA VAG QL NAA+PROBE: NOT DETECTED
C GLABRATA RNA VAG QL NAA+PROBE: NOT DETECTED
C TRACH RRNA SPEC QL NAA+PROBE: NOT DETECTED
CANDIDA RRNA VAG QL PROBE: NOT DETECTED
COLOR: YELLOW
ESTIMATED AVERAGE GLUCOSE: 214 MG/DL
GLUCOSE QUALITATIVE U: 100 MG/DL
HBA1C MFR BLD HPLC: 9.1 %
HBV SURFACE AG SER QL: NONREACTIVE
HCV AB SER QL: NONREACTIVE
HCV S/CO RATIO: 0.18 S/CO
HGB A MFR BLD: 97.6 %
HGB A2 MFR BLD: 2.4 %
HGB FRACT BLD-IMP: NORMAL
HIV1+2 AB SPEC QL IA.RAPID: NONREACTIVE
KETONES URINE: NEGATIVE MG/DL
LEUKOCYTE ESTERASE URINE: NEGATIVE
N GONORRHOEA RRNA SPEC QL NAA+PROBE: NOT DETECTED
NITRITE URINE: NEGATIVE
PH URINE: 5.5
PROTEIN URINE: NEGATIVE MG/DL
SPECIFIC GRAVITY URINE: 1.03
T PALLIDUM AB SER QL IA: NEGATIVE
T VAGINALIS RRNA SPEC QL NAA+PROBE: NOT DETECTED
UROBILINOGEN URINE: 0.2 MG/DL
VZV AB TITR SER: POSITIVE
VZV IGG SER IF-ACNC: 1358 INDEX

## 2023-12-29 NOTE — PLAN
[FreeTextEntry1] : IUP at 7+ weeks by emmyo today. Her LMP does NOT match with her sonogram. See sonogram report.  Patient counselled about her diabetes and the need to have tight control of her sugars. I recommended she start measuring her sugars 5x/day. Continue current insulin regimen.  Labs ordered  Refer to HR for consultation and repeat sonogram.  f/u 4 weeks.

## 2023-12-29 NOTE — HISTORY OF PRESENT ILLNESS
[FreeTextEntry1] : 34 y/o , LMP 10/01/2023, here today for confirmation of pregnancy. She has a known history of ectopic pregnancy a few months ago treated with methotrexate. She has PCOS and has been trying to conceive using letrozole. This pregnancy was conceived using letrozole.   Denies abnormal vaginal discharge, pelvic pain or urinary symptoms. Denies bleeding. No nausea/vomitting  Last Annual: 2/2023 Last PAP: 2/2023  meds Lantus - 20 units at night Humulog - 5 unites before each meal.  a1c = 9.5  - 1 month ago.

## 2023-12-30 LAB
BACTERIA UR CULT: NORMAL
LEAD BLD-MCNC: <1 UG/DL

## 2024-01-06 LAB
AR GENE MUT ANL BLD/T: NORMAL
CFTR MUT TESTED BLD/T: NEGATIVE
FMR1 GENE MUT ANL BLD/T: NORMAL
M TB IFN-G BLD-IMP: NEGATIVE
QUANTIFERON TB PLUS MITOGEN MINUS NIL: 3.78 IU/ML
QUANTIFERON TB PLUS NIL: 0.01 IU/ML
QUANTIFERON TB PLUS TB1 MINUS NIL: 0.01 IU/ML
QUANTIFERON TB PLUS TB2 MINUS NIL: 0.01 IU/ML
RUBV IGG FLD-ACNC: 0.9 INDEX
RUBV IGG SER-IMP: NORMAL

## 2024-01-11 ENCOUNTER — EMERGENCY (EMERGENCY)
Facility: HOSPITAL | Age: 34
LOS: 0 days | Discharge: HOME | End: 2024-01-11
Attending: EMERGENCY MEDICINE
Payer: MEDICAID

## 2024-01-11 ENCOUNTER — APPOINTMENT (OUTPATIENT)
Dept: OBGYN | Facility: CLINIC | Age: 34
End: 2024-01-11
Payer: MEDICAID

## 2024-01-11 VITALS
OXYGEN SATURATION: 100 % | RESPIRATION RATE: 19 BRPM | SYSTOLIC BLOOD PRESSURE: 132 MMHG | DIASTOLIC BLOOD PRESSURE: 82 MMHG | HEART RATE: 100 BPM

## 2024-01-11 VITALS
OXYGEN SATURATION: 99 % | WEIGHT: 220.02 LBS | RESPIRATION RATE: 18 BRPM | HEIGHT: 64 IN | SYSTOLIC BLOOD PRESSURE: 129 MMHG | DIASTOLIC BLOOD PRESSURE: 78 MMHG | TEMPERATURE: 98 F | HEART RATE: 98 BPM

## 2024-01-11 DIAGNOSIS — O26.891 OTHER SPECIFIED PREGNANCY RELATED CONDITIONS, FIRST TRIMESTER: ICD-10-CM

## 2024-01-11 DIAGNOSIS — O24.311 UNSPECIFIED PRE-EXISTING DIABETES MELLITUS IN PREGNANCY, FIRST TRIMESTER: ICD-10-CM

## 2024-01-11 DIAGNOSIS — O20.0 THREATENED ABORTION: ICD-10-CM

## 2024-01-11 DIAGNOSIS — Z3A.08 8 WEEKS GESTATION OF PREGNANCY: ICD-10-CM

## 2024-01-11 LAB
BLD GP AB SCN SERPL QL: SIGNIFICANT CHANGE UP
BLD GP AB SCN SERPL QL: SIGNIFICANT CHANGE UP

## 2024-01-11 PROCEDURE — 90384 RH IG FULL-DOSE IM: CPT

## 2024-01-11 PROCEDURE — 86900 BLOOD TYPING SEROLOGIC ABO: CPT

## 2024-01-11 PROCEDURE — 99283 EMERGENCY DEPT VISIT LOW MDM: CPT | Mod: 25

## 2024-01-11 PROCEDURE — 76815 OB US LIMITED FETUS(S): CPT

## 2024-01-11 PROCEDURE — 36415 COLL VENOUS BLD VENIPUNCTURE: CPT

## 2024-01-11 PROCEDURE — 96372 THER/PROPH/DIAG INJ SC/IM: CPT

## 2024-01-11 PROCEDURE — 99214 OFFICE O/P EST MOD 30 MIN: CPT | Mod: TH,25

## 2024-01-11 PROCEDURE — 99283 EMERGENCY DEPT VISIT LOW MDM: CPT

## 2024-01-11 PROCEDURE — 86850 RBC ANTIBODY SCREEN: CPT

## 2024-01-11 PROCEDURE — 86901 BLOOD TYPING SEROLOGIC RH(D): CPT

## 2024-01-11 NOTE — ED PROVIDER NOTE - PATIENT PORTAL LINK FT
You can access the FollowMyHealth Patient Portal offered by [facility] by registering at the following website: [email or web address]. By joining [employer]’s FollowMyHealth portal, you will also be able to view your health information using other applications (apps) compatible with our system. You can access the FollowMyHealth Patient Portal offered by [facility] by registering at the following website: [email or web address]. By joining [employer]’s FollowMyHealth portal, you will also be able to view your health information using other applications (apps) compatible with our system.

## 2024-01-11 NOTE — PLAN
[FreeTextEntry1] : Sono performed (see report) +FH Patient counselled wayne she has a thr ab. She is RH negative - she was sent to ED to get rhogam.  A1c 9.1 (down from 9.7).  Patient counselled that her pregnancy is at risk if her a1c is not under control. She has appt with high risk clinic.

## 2024-01-11 NOTE — ED PROVIDER NOTE - CARE PROVIDERS DIRECT ADDRESSES
[email or web address]

## 2024-01-11 NOTE — ED ADULT NURSE NOTE - PRIMARY CARE PROVIDER
"ED Provider Note    CHIEF COMPLAINT  Chief Complaint   Patient presents with   • Leg Pain       HPI  Nohemy Baumann is a 49 y.o. female who presents to the emergency room today with complaint of swelling to lower extremities increased on the left. Patient states this has been progressive over the last week she was seen by her nephrologist O month ago and increased on her Lasix to 2 twice a day. She has continued and worsening swelling to her left leg with pain over this area extends from the lower leg up towards the hip worsens with movement or palpation. Denies any nausea and vomiting no fever chills no chest pain or shortness of breath. She has a history of diabetes and hypertension. Symptoms occurred THE CONTACTS WERE NORMAL ACTIVITIES.    REVIEW OF SYSTEMS  See HPI for further details. All other systems are negative.     PAST MEDICAL HISTORY  Past Medical History   Diagnosis Date   • Hypertension    • Diabetes (CMS-HCC)    • Hypercholesteremia        FAMILY HISTORY  [unfilled]    SOCIAL HISTORY  Social History     Social History   • Marital Status: Single     Spouse Name: N/A   • Number of Children: N/A   • Years of Education: N/A     Social History Main Topics   • Smoking status: Not on file   • Smokeless tobacco: Not on file   • Alcohol Use: Not on file   • Drug Use: Not on file   • Sexual Activity: Not on file     Other Topics Concern   • Not on file     Social History Narrative   • No narrative on file       SURGICAL HISTORY  No past surgical history on file.    CURRENT MEDICATIONS  Home Medications     **Home medications have not yet been reviewed for this encounter**          ALLERGIES  Allergies   Allergen Reactions   • Nitroglycerin Hives     Nitro patch only, oral tablet is ok per pt   • Vancomycin Hives       PHYSICAL EXAM  VITAL SIGNS: /74 mmHg  Pulse 79  Temp(Src) 36.9 °C (98.4 °F)  Resp 16  Ht 1.6 m (5' 2.99\")  Wt 84.369 kg (186 lb)  BMI 32.96 kg/m2  SpO2 96%      Constitutional: Well " developed, Well nourished, No acute distress, Non-toxic appearance.   HENT: Normocephalic, Atraumatic, Bilateral external ears normal, Oropharynx moist, No oral exudates, Nose normal.   Eyes: PERRLA, EOMI, Conjunctiva normal, No discharge.   Neck: Normal range of motion, No tenderness, Supple, No stridor.   Lymphatic: No lymphadenopathy noted.   Cardiovascular: Normal heart rate, Normal rhythm, No murmurs, No rubs, No gallops.   Thorax & Lungs: Normal breath sounds, No respiratory distress, No wheezing, No chest tenderness.   Abdomen: Bowel sounds normal, Soft, No tenderness, No masses, No pulsatile masses.   Skin: Warm, Dry, No erythema, No rash.   Back: No tenderness, No CVA tenderness.   Extremities: Intact distal pulses, 3+ pitting edema lower extremities increased on the left,  tenderness left leg over the anterior surfaces although does extend posteriorly, No cyanosis, No clubbing.   Musculoskeletal: Patient is able ambulate but does have some pain with movement of her legs  Neurologic: Alert & oriented x 3, Normal motor function, Normal sensory function, No focal deficits noted.   Psychiatric: Affect normal, Judgment normal, Mood normal.         RADIOLOGY/PROCEDURES  Ultrasound performed shows no evidence of DVT I think chronic no acute process noted.    COURSE & MEDICAL DECISION MAKING  Pertinent Labs & Imaging studies reviewed. (See chart for details)  Discussed case with the patient's nephrologist on-call, arrived Lasix 40 mg once a day in the morning. We will place patient on compression stockings. Advised elevation to her lower extremities and follow-up with her nephrologist Dr. Gudino. Was also noted that she had decreased albumin she may have some 3rd spacing because of this we discussed diet alterations. Placed on medication for pain. He verbalized understanding instructions and need for follow-up is described above in the above instructions. DVT acute was not seen on    FINAL IMPRESSION  1.  Bilateral lower extremity edema  2. Hypoalbuminemia  3. Diabetes mellitus by history         Electronically signed by: Benny Lofton, 7/21/2017 5:50 AM     pcp

## 2024-01-11 NOTE — ED ADULT NURSE NOTE - NSFALLUNIVINTERV_ED_ALL_ED
Bed/Stretcher in lowest position, wheels locked, appropriate side rails in place/Call bell, personal items and telephone in reach/Instruct patient to call for assistance before getting out of bed/chair/stretcher/Non-slip footwear applied when patient is off stretcher/Means to call system/Physically safe environment - no spills, clutter or unnecessary equipment/Purposeful proactive rounding/Room/bathroom lighting operational, light cord in reach Bed/Stretcher in lowest position, wheels locked, appropriate side rails in place/Call bell, personal items and telephone in reach/Instruct patient to call for assistance before getting out of bed/chair/stretcher/Non-slip footwear applied when patient is off stretcher/[place] to call system/Physically safe environment - no spills, clutter or unnecessary equipment/Purposeful proactive rounding/Room/bathroom lighting operational, light cord in reach

## 2024-01-11 NOTE — PHYSICAL EXAM
[Chaperone Present] : A chaperone was present in the examining room during all aspects of the physical examination [Soft] : soft [Non-tender] : non-tender [Non-distended] : non-distended [No HSM] : No HSM [No Lesions] : no lesions [No Mass] : no mass

## 2024-01-11 NOTE — ED PROVIDER NOTE - CARE PROVIDER_API CALL
Donis Richey  Obstetrics and Gynecology  [address] [place] [address]  Phone: [phone]  Fax: [phone]  Follow Up Time:    Donis Richey  Obstetrics and Gynecology  [address] [place] [address]  Phone: [phone]  Fax: [phone]  Follow Up Time:

## 2024-01-11 NOTE — ED PROVIDER NOTE - OBJECTIVE STATEMENT
34 y/o female with hx of NIDDM, Rh negative blood , presents from OB office for evaluation . patient 8 weeks pregnant and had scant vaginal discharge this am. patient had ultrasound and sent for rhogam injection. no abdominal pain , vomiting, fevers, palpitations

## 2024-01-11 NOTE — ED PROVIDER NOTE - PHYSICAL EXAMINATION
general: well appearing, no distress  eyes: clear conjunctiva  : no vaginal bleeding, os closed, neg bimanual examination  abdomen: no CVA tenderness, BS x 4, non tender, no distention, no rashes, no rebound or guarding  msk: pelvis stable, gait steady  skin: no rashes, swelling, bruising

## 2024-01-11 NOTE — HISTORY OF PRESENT ILLNESS
[FreeTextEntry1] : 32 y/o  here today for a follow-up visit. IUP at 9w1d (emmyrenato). Patient c/o brown discharge for the past 3 days.  No cramping. She denies having intercourse before the brownish discharge. She is sexually active.  She is currently on insulin - taking lantus 21 units in the evening and humulog 4 or 6 unites before each meal. Her fasting sugars are usually under 100. Her 2 hour pp are 160-200 but occasionally it can be 60.

## 2024-01-11 NOTE — ED PROVIDER NOTE - CLINICAL SUMMARY MEDICAL DECISION MAKING FREE TEXT BOX
33y female sent for rhogam, had red/brown spotting, seen by Yeimi who confirmed IUP/FHR on sono per pt, no exam at that time, on our exam well appearing with unremarkable pelvic exam with closed os, given rhogam, will d/c pelvic rest and outpatient f/u. Patient counseled regarding conditions which should prompt return.

## 2024-01-25 ENCOUNTER — OUTPATIENT (OUTPATIENT)
Dept: OUTPATIENT SERVICES | Facility: HOSPITAL | Age: 34
LOS: 1 days | End: 2024-01-25
Payer: MEDICAID

## 2024-01-25 ENCOUNTER — ASOB RESULT (OUTPATIENT)
Age: 34
End: 2024-01-25

## 2024-01-25 ENCOUNTER — APPOINTMENT (OUTPATIENT)
Dept: ANTEPARTUM | Facility: CLINIC | Age: 34
End: 2024-01-25
Payer: MEDICAID

## 2024-01-25 ENCOUNTER — APPOINTMENT (OUTPATIENT)
Dept: OBGYN | Facility: CLINIC | Age: 34
End: 2024-01-25
Payer: MEDICAID

## 2024-01-25 DIAGNOSIS — O09.90 SUPERVISION OF HIGH RISK PREGNANCY, UNSPECIFIED, UNSPECIFIED TRIMESTER: ICD-10-CM

## 2024-01-25 DIAGNOSIS — E11.9 TYPE 2 DIABETES MELLITUS W/OUT COMPLICATIONS: ICD-10-CM

## 2024-01-25 DIAGNOSIS — Z34.90 ENCOUNTER FOR SUPERVISION OF NORMAL PREGNANCY, UNSPECIFIED, UNSPECIFIED TRIMESTER: ICD-10-CM

## 2024-01-25 LAB — GLUCOSE BLDC GLUCOMTR-MCNC: 103 MG/DL — HIGH (ref 70–99)

## 2024-01-25 PROCEDURE — 99213 OFFICE O/P EST LOW 20 MIN: CPT | Mod: TH

## 2024-01-25 PROCEDURE — 76801 OB US < 14 WKS SINGLE FETUS: CPT | Mod: 26

## 2024-01-25 PROCEDURE — 99214 OFFICE O/P EST MOD 30 MIN: CPT

## 2024-01-25 PROCEDURE — 76805 OB US >/= 14 WKS SNGL FETUS: CPT

## 2024-01-25 PROCEDURE — 99214 OFFICE O/P EST MOD 30 MIN: CPT | Mod: TH,25

## 2024-01-25 PROCEDURE — G0108: CPT

## 2024-01-25 PROCEDURE — 82962 GLUCOSE BLOOD TEST: CPT

## 2024-01-25 RX ORDER — ALBUTEROL SULFATE 2.5 MG/3ML
(2.5 MG/3ML) SOLUTION RESPIRATORY (INHALATION)
Qty: 1 | Refills: 5 | Status: ACTIVE | COMMUNITY
Start: 2021-08-10 | End: 1900-01-01

## 2024-01-25 RX ORDER — MONTELUKAST 10 MG/1
10 TABLET, FILM COATED ORAL
Qty: 30 | Refills: 11 | Status: ACTIVE | COMMUNITY
Start: 1900-01-01 | End: 1900-01-01

## 2024-01-25 RX ORDER — INSULIN DETEMIR 100 [IU]/ML
100 INJECTION, SOLUTION SUBCUTANEOUS
Qty: 5 | Refills: 11 | Status: ACTIVE | COMMUNITY
Start: 2024-01-25 | End: 1900-01-01

## 2024-01-31 DIAGNOSIS — O99.211 OBESITY COMPLICATING PREGNANCY, FIRST TRIMESTER: ICD-10-CM

## 2024-01-31 DIAGNOSIS — O24.111 PRE-EXISTING TYPE 2 DIABETES MELLITUS, IN PREGNANCY, FIRST TRIMESTER: ICD-10-CM

## 2024-01-31 DIAGNOSIS — O09.521 SUPERVISION OF ELDERLY MULTIGRAVIDA, FIRST TRIMESTER: ICD-10-CM

## 2024-01-31 DIAGNOSIS — O36.80X0 PREGNANCY WITH INCONCLUSIVE FETAL VIABILITY, NOT APPLICABLE OR UNSPECIFIED: ICD-10-CM

## 2024-01-31 DIAGNOSIS — Z3A.11 11 WEEKS GESTATION OF PREGNANCY: ICD-10-CM

## 2024-01-31 DIAGNOSIS — O98.319 OTHER INFECTIONS WITH A PREDOMINANTLY SEXUAL MODE OF TRANSMISSION COMPLICATING PREGNANCY, UNSPECIFIED TRIMESTER: ICD-10-CM

## 2024-01-31 NOTE — DISCUSSION/SUMMARY
[FreeTextEntry1] : 24 MFM Att'g & PGY-3 Initial Consult Note:  Ms Negro is referred by Dr Chandler. [id number] at 11w1d (FORREST  by   presenting for consultation for T2DM. Currently managed by Dr. Richey. Pregnancy conceived with ovulation induction on clomid.  She is doing well. She denies contractions, leakage of fluid, vaginal bleeding.  PMHx: DM2: last A1c 9.1.  Previously on Ozempic and Syngarty, switched to Glargine daily, Lispro tid.                       States finger sticks are poorly controlled, -133 and 2PP 150-200. Follows with endocrinologist Dr. Robles.  Also has asthma. Denies hospitalizations or intubations. Currently on albuterol PRN, symbicort daily, and singular daily. Denies recent asthma exacerbations. Last asthma attack was one year ago when she had RSV. She was seen in the ER but not admitted to the hospital. She is vaccinated for covid x2 and flu.   Allergies: seasonal Meds: albuterol PRN, symbicort daily, and singular daily.  Lantus 22U AM and Humalog 2-5.   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 1. T2DM: Suboptimal Control: Checking fs 2x/d. Breakfast 2 eggs and an English muffin, Lunch grilled chicken, sweet potatoes and broccoli and for dinner will eat salmon, steak or chicken with a baked potato and broccoli. Is only checking finger sticks twice a day. Current Insulin:        Glargine: am 22U       Lispro: 2-5 Units ac meals.  New Insulin:      Detemir 10U before breakfast;  12Units before dinner.  Small hs snack.       Lispro:    3 Units ac break;  3 Units ac Lunch;   3 Units ac supper.  -increase finger stick to 4x day We discussed the risk of end organ damage, including eyes, heart, kidneys in patients with diabetes, as well as the risk of coma and death in patients with uncontrolled diabetes.    Counselin. The patient will be evaluated by a nutritionist and instructed in adhering to a diabetic diet.  2. She was counseled by a nurse and instructed in capillary blood glucose testing (fasting and 2 hours after each meal).  3. The significance and usual management of diabetes in pregnancy were reviewed, including risks of preeclampsia, birth defects, miscarriage, Intra-Uterine Fetal Demise, macrosomia, birth trauma, pre-term labor,  section, NICU admission (the main reasons include  hypoglycemia and  jaundice) and the possible need for insulin therapy.  4. Exercise was encouraged. Ideally, she should walk briskly after each meal.  Recommendations:  1. Glycemic Control. Target glucose ranges to minimize excessive fetal growth are: Fasting 60-90 mg/dl; preprandial  mg/dl; and 2-hour postprandial < 120 mg/dl. If these values are elevated, insulin therapy is encouraged, although oral hypoglycemic agents are reasonable to try depending on the finger stick log.  2. Timing of Delivery. If spontaneous delivery has not occurred by 39 weeks gestation, delivery may be planned between 39-40 weeks. If complications, such as preeclampsia, macrosomia or poor glucose control develop, then delivery plans may need to be revised.  3. Route of delivery. Diabetes is associated with about a six-fold risk for shoulder dystocia. Operative vaginal deliveries should be approached with caution if at all.  4. Glucose control in labor. During labor, capillary glucose values should be checked every few hours (depending on their stability). Insulin may be required to cover elevated glucose levels.  ** Antepartum testing. Daily fetal movement counts are recommended from 28 weeks. Weekly or twice weekly biophysical profiles are recommended starting at 32 weeks gestation, depending on the glucose control.   **Ultrasound assessment of fetal growth monthly is rec.  Recommend:  1.24 hour UTP collection or UPr/Cr.   2.CMP, Hemoglobin A1C, TSH with reflex FT4  3.Dilated eye exam and foot exam  4.Fingerstick log  5.EKG  6. Fetal echocardiogram @20w  7. Dietician consult.  8. Aspirin 81 mg PO daily to start prior to 12 weeks gestation.   2. Obesity  -We discussed that obesity is associated with a host of pregnancy complications.   -The patient is at increased risk for preeclampsia and gestational hypertension,  delivery, macrosomia, gestational diabetes, deep venous thrombosis,  delivery, stillbirth and certain birth defects, such as open neural tube defects.  - Weekly biophysical profiles as above.  - cardiology consult placed  3. Asthma:  Continue with current regimen, follows closely with Dr. Saad amador of [facility] In general, the effect of pregnancy on asthma is favorable with a majority of patients is either stable or improved.  The effect of asthma on her fetus depends on the degree of her asthmatic control and oxygenation.  Maternal hypoxia may be associated with fetal growth restriction and fetal hypoxia.   Approximately 30-40% of patients with asthma improve with pregnancy, unfortunately about 25% of patients can worsen.  Patients requiring "rescue" therapy more than twice a week benefit from maintenance therapy with steroid inhalers.  Fetal risks associated with under controlled asthma include IUGR, IUFD, and preeclampsia.  The potential need for serial U/S and  testing was discussed.     4. Pregnancy:  f/u with Dr. Richey at next scheduled appointment  RTC 1 week for FS log review MD Hiram, FACOG with MD Cl[place], PGY-3   non-reactive

## 2024-02-02 LAB
1P36 DELETION SYN POPULATION-BASED RISK: NORMAL
1P36 DELETION SYNDROME RESULT: NORMAL
1P36 DELETION SYNDROME RISK AFTER TEST: NORMAL
22Q11.2 DELETION POPULATION-BASED RISK: NORMAL
22Q11.2 DELETION RISK AFTER TEST: NORMAL
22Q11.2 DELETION SYNDROME RESULT: NORMAL
ANGELMAN SYNDROME POPULATION-BASED RISK: NORMAL
ANGELMAN SYNDROME RESULT: NORMAL
ANGELMAN SYNDROME RISK AFTER TEST: NORMAL
CRI-DU-CHAT SYNDR POPULATION-BASED RISK: NORMAL
CRI-DU-CHAT SYNDROME RESULT: NORMAL
CRI-DU-CHAT SYNDROME RISK AFTER TEST: NORMAL
FETAL FRACTION: NORMAL
GENDER OF FETUS: NORMAL
MONOSOMY X AGE-BASED RISK: NORMAL
MONOSOMY X RESULT: NORMAL
MONOSOMY X RISK AFTER TEST: NORMAL
PANORAMA SCREEN: NORMAL
PRADER-WILLI SYND POPULATION-BASED RISK: NORMAL
PRADER-WILLI SYNDROME RESULT: NORMAL
PRADER-WILLI SYNDROME RISK AFTER TEST: NORMAL
REPRODUCTIVE CARRIER MULTIGENE ANL BLD/T: NORMAL
RPT COMMENT: NORMAL
TEST PERFORMANCE INFO SPEC: NORMAL
TRIPLOIDY RESULT: NORMAL
TRISOMY 13 AGE-BASED RISK: NORMAL
TRISOMY 13 RESULT: NORMAL
TRISOMY 13 RISK AFTER TEST: NORMAL
TRISOMY 18 AGE-BASED RISK: NORMAL
TRISOMY 18 RESULT: NORMAL
TRISOMY 18 RISK AFTER TEST: NORMAL
TRISOMY 21 RESULT: NORMAL
TRISOMY 21 RISK AFTER TEST: NORMAL

## 2024-02-07 ENCOUNTER — APPOINTMENT (OUTPATIENT)
Dept: ANTEPARTUM | Facility: CLINIC | Age: 34
End: 2024-02-07
Payer: MEDICAID

## 2024-02-07 ENCOUNTER — ASOB RESULT (OUTPATIENT)
Age: 34
End: 2024-02-07

## 2024-02-07 ENCOUNTER — NON-APPOINTMENT (OUTPATIENT)
Age: 34
End: 2024-02-07

## 2024-02-07 ENCOUNTER — OUTPATIENT (OUTPATIENT)
Dept: OUTPATIENT SERVICES | Facility: HOSPITAL | Age: 34
LOS: 1 days | End: 2024-02-07
Payer: MEDICAID

## 2024-02-07 ENCOUNTER — APPOINTMENT (OUTPATIENT)
Dept: OBGYN | Facility: CLINIC | Age: 34
End: 2024-02-07
Payer: MEDICAID

## 2024-02-07 VITALS — BODY MASS INDEX: 39.95 KG/M2 | WEIGHT: 234 LBS | HEIGHT: 64 IN

## 2024-02-07 DIAGNOSIS — Z71.3 DIETARY COUNSELING AND SURVEILLANCE: ICD-10-CM

## 2024-02-07 DIAGNOSIS — Z34.90 ENCOUNTER FOR SUPERVISION OF NORMAL PREGNANCY, UNSPECIFIED, UNSPECIFIED TRIMESTER: ICD-10-CM

## 2024-02-07 PROCEDURE — 76815 OB US LIMITED FETUS(S): CPT | Mod: 26

## 2024-02-07 PROCEDURE — 76813 OB US NUCHAL MEAS 1 GEST: CPT | Mod: 26

## 2024-02-07 PROCEDURE — 76813 OB US NUCHAL MEAS 1 GEST: CPT

## 2024-02-07 PROCEDURE — 76815 OB US LIMITED FETUS(S): CPT

## 2024-02-08 ENCOUNTER — NON-APPOINTMENT (OUTPATIENT)
Age: 34
End: 2024-02-08

## 2024-02-08 DIAGNOSIS — O99.211 OBESITY COMPLICATING PREGNANCY, FIRST TRIMESTER: ICD-10-CM

## 2024-02-08 DIAGNOSIS — Z36.89 ENCOUNTER FOR OTHER SPECIFIED ANTENATAL SCREENING: ICD-10-CM

## 2024-02-08 DIAGNOSIS — Z3A.13 13 WEEKS GESTATION OF PREGNANCY: ICD-10-CM

## 2024-02-08 DIAGNOSIS — O98.511 OTHER VIRAL DISEASES COMPLICATING PREGNANCY, FIRST TRIMESTER: ICD-10-CM

## 2024-02-08 DIAGNOSIS — Z36.82 ENCOUNTER FOR ANTENATAL SCREENING FOR NUCHAL TRANSLUCENCY: ICD-10-CM

## 2024-02-08 DIAGNOSIS — O09.511 SUPERVISION OF ELDERLY PRIMIGRAVIDA, FIRST TRIMESTER: ICD-10-CM

## 2024-02-08 DIAGNOSIS — O24.111 PRE-EXISTING TYPE 2 DIABETES MELLITUS, IN PREGNANCY, FIRST TRIMESTER: ICD-10-CM

## 2024-02-09 ENCOUNTER — APPOINTMENT (OUTPATIENT)
Dept: ANTEPARTUM | Facility: CLINIC | Age: 34
End: 2024-02-09

## 2024-02-09 ENCOUNTER — NON-APPOINTMENT (OUTPATIENT)
Age: 34
End: 2024-02-09

## 2024-02-15 ENCOUNTER — APPOINTMENT (OUTPATIENT)
Dept: ANTEPARTUM | Facility: CLINIC | Age: 34
End: 2024-02-15

## 2024-02-21 ENCOUNTER — APPOINTMENT (OUTPATIENT)
Dept: CARDIOLOGY | Facility: CLINIC | Age: 34
End: 2024-02-21

## 2024-02-23 ENCOUNTER — NON-APPOINTMENT (OUTPATIENT)
Age: 34
End: 2024-02-23

## 2024-02-26 ENCOUNTER — APPOINTMENT (OUTPATIENT)
Dept: ANTEPARTUM | Facility: CLINIC | Age: 34
End: 2024-02-26

## 2024-02-26 ENCOUNTER — NON-APPOINTMENT (OUTPATIENT)
Age: 34
End: 2024-02-26

## 2024-02-26 ENCOUNTER — APPOINTMENT (OUTPATIENT)
Dept: OBGYN | Facility: CLINIC | Age: 34
End: 2024-02-26

## 2024-02-28 ENCOUNTER — NON-APPOINTMENT (OUTPATIENT)
Age: 34
End: 2024-02-28

## 2024-02-29 ENCOUNTER — NON-APPOINTMENT (OUTPATIENT)
Age: 34
End: 2024-02-29

## 2024-02-29 ENCOUNTER — APPOINTMENT (OUTPATIENT)
Dept: ANTEPARTUM | Facility: CLINIC | Age: 34
End: 2024-02-29
Payer: MEDICAID

## 2024-02-29 ENCOUNTER — APPOINTMENT (OUTPATIENT)
Dept: OBGYN | Facility: CLINIC | Age: 34
End: 2024-02-29

## 2024-02-29 ENCOUNTER — OUTPATIENT (OUTPATIENT)
Dept: OUTPATIENT SERVICES | Facility: HOSPITAL | Age: 34
LOS: 1 days | End: 2024-02-29
Payer: MEDICAID

## 2024-02-29 VITALS
TEMPERATURE: 98.6 F | SYSTOLIC BLOOD PRESSURE: 112 MMHG | HEART RATE: 88 BPM | OXYGEN SATURATION: 100 % | BODY MASS INDEX: 40.51 KG/M2 | WEIGHT: 236 LBS | DIASTOLIC BLOOD PRESSURE: 54 MMHG

## 2024-02-29 DIAGNOSIS — O09.90 SUPERVISION OF HIGH RISK PREGNANCY, UNSPECIFIED, UNSPECIFIED TRIMESTER: ICD-10-CM

## 2024-02-29 LAB
BILIRUB UR QL STRIP: NEGATIVE
CLARITY UR: NORMAL
COLLECTION METHOD: NORMAL
GLUCOSE BLDC GLUCOMTR-MCNC: 112
GLUCOSE BLDC GLUCOMTR-MCNC: 112 MG/DL — HIGH (ref 70–99)
GLUCOSE UR-MCNC: 250
HCG UR QL: 0.2 EU/DL
HGB UR QL STRIP.AUTO: NEGATIVE
KETONES UR-MCNC: NEGATIVE
LEUKOCYTE ESTERASE UR QL STRIP: NEGATIVE
NITRITE UR QL STRIP: NEGATIVE
OB COMMENTS: NORMAL
PH UR STRIP: 6
PROT UR STRIP-MCNC: NORMAL
SCHEDULED VISIT: YES
SP GR UR STRIP: 1.02
URINE ALBUMIN/PROTEIN: NORMAL
URINE GLUCOSE: 250
URINE KETONES: NEGATIVE
WEEKS GESTATION: 16.1

## 2024-02-29 PROCEDURE — 82948 REAGENT STRIP/BLOOD GLUCOSE: CPT

## 2024-02-29 PROCEDURE — 82962 GLUCOSE BLOOD TEST: CPT

## 2024-02-29 PROCEDURE — 99213 OFFICE O/P EST LOW 20 MIN: CPT

## 2024-02-29 PROCEDURE — 81002 URINALYSIS NONAUTO W/O SCOPE: CPT

## 2024-02-29 PROCEDURE — 99213 OFFICE O/P EST LOW 20 MIN: CPT | Mod: 25

## 2024-03-01 ENCOUNTER — OUTPATIENT (OUTPATIENT)
Dept: OUTPATIENT SERVICES | Facility: HOSPITAL | Age: 34
LOS: 1 days | End: 2024-03-01
Payer: MEDICAID

## 2024-03-01 ENCOUNTER — APPOINTMENT (OUTPATIENT)
Dept: ANTEPARTUM | Facility: CLINIC | Age: 34
End: 2024-03-01
Payer: MEDICAID

## 2024-03-01 ENCOUNTER — ASOB RESULT (OUTPATIENT)
Age: 34
End: 2024-03-01

## 2024-03-01 ENCOUNTER — INPATIENT (INPATIENT)
Facility: HOSPITAL | Age: 34
LOS: 2 days | Discharge: HOME | DRG: 566 | End: 2024-03-04
Attending: OBSTETRICS & GYNECOLOGY | Admitting: OBSTETRICS & GYNECOLOGY
Payer: MEDICAID

## 2024-03-01 VITALS — SYSTOLIC BLOOD PRESSURE: 119 MMHG | HEART RATE: 100 BPM | DIASTOLIC BLOOD PRESSURE: 56 MMHG

## 2024-03-01 DIAGNOSIS — O26.893 OTHER SPECIFIED PREGNANCY RELATED CONDITIONS, THIRD TRIMESTER: ICD-10-CM

## 2024-03-01 DIAGNOSIS — Z34.90 ENCOUNTER FOR SUPERVISION OF NORMAL PREGNANCY, UNSPECIFIED, UNSPECIFIED TRIMESTER: ICD-10-CM

## 2024-03-01 LAB
ALBUMIN SERPL ELPH-MCNC: 3.8 G/DL — SIGNIFICANT CHANGE UP (ref 3.5–5.2)
ALP SERPL-CCNC: 70 U/L — SIGNIFICANT CHANGE UP (ref 30–115)
ALT FLD-CCNC: 26 U/L — SIGNIFICANT CHANGE UP (ref 0–41)
ANION GAP SERPL CALC-SCNC: 10 MMOL/L — SIGNIFICANT CHANGE UP (ref 7–14)
APPEARANCE UR: CLEAR — SIGNIFICANT CHANGE UP
AST SERPL-CCNC: 16 U/L — SIGNIFICANT CHANGE UP (ref 0–41)
BASOPHILS # BLD AUTO: 0.03 K/UL — SIGNIFICANT CHANGE UP (ref 0–0.2)
BASOPHILS NFR BLD AUTO: 0.3 % — SIGNIFICANT CHANGE UP (ref 0–1)
BILIRUB SERPL-MCNC: <0.2 MG/DL — SIGNIFICANT CHANGE UP (ref 0.2–1.2)
BILIRUB UR-MCNC: NEGATIVE — SIGNIFICANT CHANGE UP
BLD GP AB SCN SERPL QL: SIGNIFICANT CHANGE UP
BUN SERPL-MCNC: 16 MG/DL — SIGNIFICANT CHANGE UP (ref 10–20)
CALCIUM SERPL-MCNC: 9.3 MG/DL — SIGNIFICANT CHANGE UP (ref 8.4–10.5)
CHLORIDE SERPL-SCNC: 106 MMOL/L — SIGNIFICANT CHANGE UP (ref 98–110)
CO2 SERPL-SCNC: 23 MMOL/L — SIGNIFICANT CHANGE UP (ref 17–32)
COLOR SPEC: YELLOW — SIGNIFICANT CHANGE UP
CREAT SERPL-MCNC: 0.6 MG/DL — LOW (ref 0.7–1.5)
DIFF PNL FLD: NEGATIVE — SIGNIFICANT CHANGE UP
EGFR: 120 ML/MIN/1.73M2 — SIGNIFICANT CHANGE UP
EOSINOPHIL # BLD AUTO: 0.23 K/UL — SIGNIFICANT CHANGE UP (ref 0–0.7)
EOSINOPHIL NFR BLD AUTO: 2.6 % — SIGNIFICANT CHANGE UP (ref 0–8)
GLUCOSE BLDC GLUCOMTR-MCNC: 110 MG/DL — HIGH (ref 70–99)
GLUCOSE BLDC GLUCOMTR-MCNC: 137 MG/DL — HIGH (ref 70–99)
GLUCOSE SERPL-MCNC: 144 MG/DL — HIGH (ref 70–99)
GLUCOSE UR QL: >=1000 MG/DL
HCT VFR BLD CALC: 37.3 % — SIGNIFICANT CHANGE UP (ref 37–47)
HGB BLD-MCNC: 11.8 G/DL — LOW (ref 12–16)
IMM GRANULOCYTES NFR BLD AUTO: 0.2 % — SIGNIFICANT CHANGE UP (ref 0.1–0.3)
KETONES UR-MCNC: ABNORMAL MG/DL
LEUKOCYTE ESTERASE UR-ACNC: NEGATIVE — SIGNIFICANT CHANGE UP
LYMPHOCYTES # BLD AUTO: 1.93 K/UL — SIGNIFICANT CHANGE UP (ref 1.2–3.4)
LYMPHOCYTES # BLD AUTO: 22.2 % — SIGNIFICANT CHANGE UP (ref 20.5–51.1)
MCHC RBC-ENTMCNC: 26 PG — LOW (ref 27–31)
MCHC RBC-ENTMCNC: 31.6 G/DL — LOW (ref 32–37)
MCV RBC AUTO: 82.2 FL — SIGNIFICANT CHANGE UP (ref 81–99)
MONOCYTES # BLD AUTO: 0.61 K/UL — HIGH (ref 0.1–0.6)
MONOCYTES NFR BLD AUTO: 7 % — SIGNIFICANT CHANGE UP (ref 1.7–9.3)
NEUTROPHILS # BLD AUTO: 5.87 K/UL — SIGNIFICANT CHANGE UP (ref 1.4–6.5)
NEUTROPHILS NFR BLD AUTO: 67.7 % — SIGNIFICANT CHANGE UP (ref 42.2–75.2)
NITRITE UR-MCNC: NEGATIVE — SIGNIFICANT CHANGE UP
NRBC # BLD: 0 /100 WBCS — SIGNIFICANT CHANGE UP (ref 0–0)
PH UR: 6 — SIGNIFICANT CHANGE UP (ref 5–8)
PLATELET # BLD AUTO: 238 K/UL — SIGNIFICANT CHANGE UP (ref 130–400)
PMV BLD: 10 FL — SIGNIFICANT CHANGE UP (ref 7.4–10.4)
POTASSIUM SERPL-MCNC: 4 MMOL/L — SIGNIFICANT CHANGE UP (ref 3.5–5)
POTASSIUM SERPL-SCNC: 4 MMOL/L — SIGNIFICANT CHANGE UP (ref 3.5–5)
PROT SERPL-MCNC: 6.2 G/DL — SIGNIFICANT CHANGE UP (ref 6–8)
PROT UR-MCNC: NEGATIVE MG/DL — SIGNIFICANT CHANGE UP
RBC # BLD: 4.54 M/UL — SIGNIFICANT CHANGE UP (ref 4.2–5.4)
RBC # FLD: 14.3 % — SIGNIFICANT CHANGE UP (ref 11.5–14.5)
SODIUM SERPL-SCNC: 139 MMOL/L — SIGNIFICANT CHANGE UP (ref 135–146)
SP GR SPEC: >1.03 — HIGH (ref 1–1.03)
UROBILINOGEN FLD QL: 0.2 MG/DL — SIGNIFICANT CHANGE UP (ref 0.2–1)
WBC # BLD: 8.69 K/UL — SIGNIFICANT CHANGE UP (ref 4.8–10.8)
WBC # FLD AUTO: 8.69 K/UL — SIGNIFICANT CHANGE UP (ref 4.8–10.8)

## 2024-03-01 PROCEDURE — 84100 ASSAY OF PHOSPHORUS: CPT

## 2024-03-01 PROCEDURE — 76805 OB US >/= 14 WKS SNGL FETUS: CPT | Mod: 26

## 2024-03-01 PROCEDURE — 83036 HEMOGLOBIN GLYCOSYLATED A1C: CPT

## 2024-03-01 PROCEDURE — 84443 ASSAY THYROID STIM HORMONE: CPT

## 2024-03-01 PROCEDURE — 84439 ASSAY OF FREE THYROXINE: CPT

## 2024-03-01 PROCEDURE — 84436 ASSAY OF TOTAL THYROXINE: CPT

## 2024-03-01 PROCEDURE — 86850 RBC ANTIBODY SCREEN: CPT

## 2024-03-01 PROCEDURE — 93010 ELECTROCARDIOGRAM REPORT: CPT

## 2024-03-01 PROCEDURE — 86901 BLOOD TYPING SEROLOGIC RH(D): CPT

## 2024-03-01 PROCEDURE — 82962 GLUCOSE BLOOD TEST: CPT

## 2024-03-01 PROCEDURE — 94640 AIRWAY INHALATION TREATMENT: CPT

## 2024-03-01 PROCEDURE — 80053 COMPREHEN METABOLIC PANEL: CPT

## 2024-03-01 PROCEDURE — 84156 ASSAY OF PROTEIN URINE: CPT

## 2024-03-01 PROCEDURE — 85025 COMPLETE CBC W/AUTO DIFF WBC: CPT

## 2024-03-01 PROCEDURE — 83735 ASSAY OF MAGNESIUM: CPT

## 2024-03-01 PROCEDURE — 86900 BLOOD TYPING SEROLOGIC ABO: CPT

## 2024-03-01 PROCEDURE — 93005 ELECTROCARDIOGRAM TRACING: CPT

## 2024-03-01 PROCEDURE — 36415 COLL VENOUS BLD VENIPUNCTURE: CPT

## 2024-03-01 PROCEDURE — 81003 URINALYSIS AUTO W/O SCOPE: CPT

## 2024-03-01 RX ORDER — DEXTROSE 50 % IN WATER 50 %
15 SYRINGE (ML) INTRAVENOUS ONCE
Refills: 0 | Status: DISCONTINUED | OUTPATIENT
Start: 2024-03-01 | End: 2024-03-04

## 2024-03-01 RX ORDER — ALBUTEROL 90 UG/1
2 AEROSOL, METERED ORAL EVERY 6 HOURS
Refills: 0 | Status: DISCONTINUED | OUTPATIENT
Start: 2024-03-01 | End: 2024-03-04

## 2024-03-01 RX ORDER — BUDESONIDE AND FORMOTEROL FUMARATE DIHYDRATE 160; 4.5 UG/1; UG/1
2 AEROSOL RESPIRATORY (INHALATION)
Refills: 0 | Status: DISCONTINUED | OUTPATIENT
Start: 2024-03-01 | End: 2024-03-01

## 2024-03-01 RX ORDER — SODIUM CHLORIDE 9 MG/ML
1000 INJECTION, SOLUTION INTRAVENOUS
Refills: 0 | Status: DISCONTINUED | OUTPATIENT
Start: 2024-03-01 | End: 2024-03-04

## 2024-03-01 RX ORDER — DEXTROSE 50 % IN WATER 50 %
25 SYRINGE (ML) INTRAVENOUS ONCE
Refills: 0 | Status: DISCONTINUED | OUTPATIENT
Start: 2024-03-01 | End: 2024-03-04

## 2024-03-01 RX ORDER — BACITRACIN ZINC AND POLYMYXIN B SULFATE 500; 10000 [USP'U]/G; [USP'U]/G
1 OINTMENT OPHTHALMIC THREE TIMES A DAY
Refills: 0 | Status: DISCONTINUED | OUTPATIENT
Start: 2024-03-01 | End: 2024-03-04

## 2024-03-01 RX ORDER — VALACYCLOVIR 500 MG/1
1000 TABLET, FILM COATED ORAL DAILY
Refills: 0 | Status: DISCONTINUED | OUTPATIENT
Start: 2024-03-01 | End: 2024-03-04

## 2024-03-01 RX ORDER — INSULIN DETEMIR 100/ML (3)
12 INSULIN PEN (ML) SUBCUTANEOUS
Refills: 0 | Status: DISCONTINUED | OUTPATIENT
Start: 2024-03-01 | End: 2024-03-04

## 2024-03-01 RX ORDER — INSULIN DETEMIR 100/ML (3)
10 INSULIN PEN (ML) SUBCUTANEOUS
Refills: 0 | Status: DISCONTINUED | OUTPATIENT
Start: 2024-03-01 | End: 2024-03-04

## 2024-03-01 RX ORDER — DEXTROSE 50 % IN WATER 50 %
12.5 SYRINGE (ML) INTRAVENOUS ONCE
Refills: 0 | Status: DISCONTINUED | OUTPATIENT
Start: 2024-03-01 | End: 2024-03-04

## 2024-03-01 RX ORDER — INSULIN LISPRO 100/ML
3 VIAL (ML) SUBCUTANEOUS
Refills: 0 | Status: DISCONTINUED | OUTPATIENT
Start: 2024-03-01 | End: 2024-03-04

## 2024-03-01 RX ORDER — ASPIRIN/CALCIUM CARB/MAGNESIUM 324 MG
81 TABLET ORAL DAILY
Refills: 0 | Status: DISCONTINUED | OUTPATIENT
Start: 2024-03-01 | End: 2024-03-04

## 2024-03-01 RX ORDER — NEOMYCIN/BACIT/P-MYX/HYDROCORT 3.5-10K-1
1 OINTMENT (GRAM) OPHTHALMIC (EYE) EVERY 8 HOURS
Refills: 0 | Status: DISCONTINUED | OUTPATIENT
Start: 2024-03-01 | End: 2024-03-01

## 2024-03-01 RX ORDER — BUDESONIDE AND FORMOTEROL FUMARATE DIHYDRATE 160; 4.5 UG/1; UG/1
2 AEROSOL RESPIRATORY (INHALATION) DAILY
Refills: 0 | Status: DISCONTINUED | OUTPATIENT
Start: 2024-03-01 | End: 2024-03-04

## 2024-03-01 RX ORDER — MONTELUKAST 4 MG/1
10 TABLET, CHEWABLE ORAL DAILY
Refills: 0 | Status: DISCONTINUED | OUTPATIENT
Start: 2024-03-01 | End: 2024-03-04

## 2024-03-01 RX ORDER — GLUCAGON INJECTION, SOLUTION 0.5 MG/.1ML
1 INJECTION, SOLUTION SUBCUTANEOUS ONCE
Refills: 0 | Status: DISCONTINUED | OUTPATIENT
Start: 2024-03-01 | End: 2024-03-04

## 2024-03-01 RX ADMIN — Medication 12 UNIT(S): at 22:16

## 2024-03-01 NOTE — DISCUSSION/SUMMARY
[FreeTextEntry1] : PGY 3 Note  [id number] at 16w1d (FORREST  by presenting for consultation for T2DM. Currently managed by Dr. Richey. Pregnancy conceived with ovulation induction on clomid.   Patient did not bring blood sugar log. States that she infrequently checks her finger sticks due to her busy schedule. States when she does check her FS, fasting sugar is >120 and 2PP >200. Is currently giving herself levemir 10U AM, 10U PM, and lispro 3/3/3 (however only when she remembers). She works at [employer] and works odd and long hours. Hours change every day. Continues to eat poorly at work. Endorses frequently eating pasta with kyler sauce, and rice. Will eat salmon too.  Today patient discussed barriers to care including difficulty with her insurance and lack of social support. FOB is  to another women and continues that relationship. She states he promised her when the baby is born he will leave that relationship to be with her. Patient is adopted. Patients adopted mother passed aware 2-3 months ago from liver cancer and adoptive father  from diabetes when she was 13. Her biological mother struggles with drug addiction. She states she has 1 friend, Marilee who has offered to help her. Patient states her remaining family does not know about the pregnancy. She is concerned to discuss it with them as she knows the FOB is  to another women. She knows she will need to discuss the pregnancy with them. She currently lives alone with her dog and cat.   PMHx: DM2: last A1c 9.1. Previously on Ozempic and Syngarty, switched to Glargine daily, Lispro tid.  Follows with endocrinologist Dr. Robles. Also has asthma. Denies hospitalizations or intubations. Currently on albuterol PRN, symbicort daily, and singular daily. Denies recent asthma exacerbations. Last asthma attack was one year ago when she had RSV. She was seen in the ER but not admitted to the hospital. She is vaccinated for covid x2 and flu.  Allergies: seasonal Meds: albuterol PRN, symbicort daily, and singular daily. Lantus 22U AM and Humalog 2-5.  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[employer], works long hours, denies smoking, alcohol or drug use **see complex social history listed above  Psych: denies  Genetics: denies h/o genetic abnormalities  Physical Exam: /54: HR: 147 bpm, O2sat: 100 %, Wt: 236<235 lbs  GA: AOx3, NAD Heart: RRR, no M/R/G Lungs: CTAB Abd: soft, nontender, nondistended LE: no erythema, edema or pain UA: 250 glucose, trace protein  Labs:  A1c 9.1 O neg antibody neg Hep B NR Hep C NR Rubella: equivocal Frag X: neg SMA: neg TB Quant: Neg CF neg  OBUS: : S=D based on  sono : Normal NT screen 3/1: 16w2d Normal early anatomy   A/P Ms Negro is a 32yo  at 16w1d [place] FORREST 8/15/2024 with asthma and T2DM. 1. T2DM: Suboptimal Control: Checking fs 2x/d. -discussed with patient barriers to care. Seems that patient would benefit from inpatient admission for glucose control. Patient amenable however cannot come to hospital until tomorrow. Patient is very concerned about her job. I provided a note for work requesting reasonable accommodations as well as explaining that patient will be admitted to the hospital. Patient would like the day to discuss with boss. She will find someone to take care of her animals. Patient will need to see ophthalmology for chronic stye in eye and dermatology for persistent eczema. Consults to be placed as an inpatient.   New Insulin:  Detemir 10U before breakfast; 12Units before dinner. Small hs snack.  Lispro: 3 Units ac break; 3 Units ac Lunch; 3 Units ac supper. -increase finger stick to 4x day We discussed the risk of end organ damage, including eyes, heart, kidneys in patients with diabetes, as well as the risk of coma and death in patients with uncontrolled diabetes. Patient previously counseled on risks to fetus of poorly controlled diabetes in pregnancy  Previously recommended: 1.24 hour UTP collection or UPr/Cr. (patient did not complete, will do as inpatient) 2.CMP, Hemoglobin A1C, TSH with reflex FT4  (patient did not complete, will do as inpatient) 3.Dilated eye exam and foot exam, will send referral next appointment 4.Fingerstick log - reiterated importance 5.EKG - can complete as inpatient 6. Fetal echocardiogram @20w 7. s/p Dietician consult. 8. Aspirin 81 mg PO daily.  2. Obesity -We discussed that obesity is associated with a host of pregnancy complications. -The patient is at increased risk for preeclampsia and gestational hypertension,  delivery, macrosomia, gestational diabetes, deep venous thrombosis,  delivery, stillbirth and certain birth defects, such as open neural tube defects. - Weekly biophysical profiles as above. - cardiology consult placed  3. Asthma: Continue with current regimen, follows closely with Dr. Saad amador of [facility] In general, the effect of pregnancy on asthma is favorable with a majority of patients is either stable or improved. The effect of asthma on her fetus depends on the degree of her asthmatic control and oxygenation. Maternal hypoxia may be associated with fetal growth restriction and fetal hypoxia. Approximately 30-40% of patients with asthma improve with pregnancy, unfortunately about 25% of patients can worsen. Patients requiring "rescue" therapy more than twice a week benefit from maintenance therapy with steroid inhalers. Fetal risks associated with under controlled asthma include IUGR, IUFD, and preeclampsia. The potential need for serial U/S and  testing was discussed.  4. Social barriers -patient declined to speak with social work today -support will continue to be offered and provided as needed -patient aware she should speak with the finanical counselor at [facility] for guidance regarding her insurance situation -patient will see Dr Richey at [facility] instead of [facility] location going forward  Patient to be admitted to hospital tomorrow for glucose control as patient cannot come today. Will have early anatomy first. She will follow up with Dr[facility].

## 2024-03-01 NOTE — OB PROVIDER H&P - NSHPSOCIALHISTORY_GEN_ALL_CORE
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

## 2024-03-01 NOTE — OB RN PATIENT PROFILE - EDUCATION ON THE POTENTIAL RISKS AND IMPACT OF EARLY USE OF PACIFIERS ON THE ESTABLISHMENT OF BREASTFEEDING
OFFICE VISIT    Patient: Chantell Zamora   : 2003 MRN: [id number]    SUBJECTIVE:  Chief Complaint   Patient presents with   • Physical   • Other     Pots has been getting worse. Would like referral   • Knee     Right x 3 weeks. No injury.          Patient has given consent to record this visit for documentation in their clinical record.    A 19 year old female presents for an annual physical exam.      HISTORY OF PRESENT ILLNESS:    Historian: Self, accompanied by her significant other.    1. POTS (postural orthostatic tachycardia syndrome):  She states that she was doing fine and suddenly her pain started again after a couple of months. She states she feels dizzy with taking her hands up above her head and worsening when looking up even for a few seconds. She reports having difficulty in taking care or brushing her hair. She describes she lives in a basement hence walking upstairs, or walking her dogs aggravates her dizziness. She states the dizziness and passing out was once in a while initially, and now it is almost daily since past 6 months. She keeps herself properly hydrated and drinks lots of Gatorade and has salty snacks. She states taking salt makes her feel comfortable in 15 to 20 minutes. She mentions that she had to quit her job due to this issue as she was a  and her job required good physical efforts. She states she was started on beta blockers initially when she was diagnosed; however, her pulse used to dip into 30/minute. She has seen a cardiologist in the past and inquires about seeing cardiology currently.     She also has history of convulsions, suspected to be nonepileptic events. She informs of seeing neurology and undergoing an EEG which came as normal. 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 She inquires if this condition of hers is considered as a disability and  wants it to be on her medical note. She states that her current physiatric management is helping her. She had a virtual meet with her psychiatrist on Tuesday, and they discussed that the medications are helping her    2. Annual physical exam:    Dental screening: Overdue.    Ophthalmology screening: Up-to-date.    Cervical cancer screening: Due.  She defers getting her pap smear today.    Ob/Gyn care:   Regular menstrual cycle.    Additional comments    Right knee pain:  She mentions her knee is sore since the last 3 weeks. Denies injury or fall. She states the pain is resolving and currently only limited to certain movements. She states she was working extra hours when the pain began. She denies the pain limiting her routine. No prior history of injury to this knee.       Recent Review Flowsheet Data     Date 5/11/2023    Adult PHQ 2 Score 2    Adult PHQ 2 Interpretation No further screening needed    Little interest or pleasure in activity? Several days    Feeling down, depressed or hopeless? Several days    Adult PHQ 9 Score 11    Adult PHQ 9 Interpretation Moderate Depression    Trouble falling or staying asleep or sleeping all the time? More than half the days    Feeling tired or having little energy? More than half the days    Poor appetite or overeating? More than half the days    Feeling bad about yourself or that you are a failure or have let yourself or family down? Several days    Trouble concentrating on things such as reading the newspaper or watching TV? Several days    Moving or speaking slowly that other people have noticed or the opposite - being so fidgety or restless that you have been moving around a lot more than usual? Several days    Thoughts that you would be better off dead or of hurting yourself in some way? Not at all    If you reported any problems, how difficult have these problems made it to do your work, take care of things at home, or get along with other people? Somewhat difficult         Generalized Anxiety Disorder (GAD7)    Over the last 2 weeks, how often have you been bothered by the following problems?   Score: 13    Score:  0-4 minimal symptoms 10-14 moderate symptoms   5-9 mild symptoms 15-21 severe symptoms      1.  Feeling nervous, anxious or on edge More than half the days   2.  Not being able to stop or control worrying More than half the days   3.  Worrying too much about different things More than half the days   4.  Trouble relaxing Several days   5.  Being so restless that it's hard to sit still More than half the days   6.  Becoming easily annoyed or irritable Nearly every day   7.  Feeling afraid as if something awful might happen Several days             If you checked off any problems, how difficult have these made it for you to do your work, take care of things at home, or get along with other people? Very difficult                DEPRESSION ASSESSMENT/PLAN:  Depression managed by psychiatry.       PAST MEDICAL HISTORY:  Past Medical History:   Diagnosis Date   • Anxiety    • Depression    • POTS (postural orthostatic tachycardia syndrome)    • Seizures (CMD)      MEDICATIONS:  Current Outpatient Medications   Medication Sig Dispense Refill   • lamoTRIgine (LaMICtal) 150 MG tablet Take 1 tablet by mouth daily. 90 tablet 2   • busPIRone (BUSPAR) 10 MG tablet Take 1 tablet by mouth in the morning and 1 tablet in the evening. 180 tablet 1   • hydrOXYzine (ATARAX) 50 MG tablet Take 1 tablet by mouth 3 times daily as needed for Anxiety. 90 tablet 1     No current facility-administered medications for this visit.     ALLERGIES:  Allergies as of 05/11/2023 - Reviewed 05/11/2023   Allergen Reaction Noted   • Amoxicillin  12/23/2013   • Penicillins  12/23/2013     FAMILY HISTORY:  Family History   Problem Relation Age of Onset   • ADHD/ADD Sister    • Anxiety disorder Sister      SOCIAL HISTORY:  Social History     Tobacco Use   • Smoking status: Never   • Smokeless tobacco: Never    Vaping Use   • Vaping status: Some Days     Substances: Nicotine, CBD, Flavoring     Devices: Disposable, Pre-filled or refillable cartridge     Passive vaping exposure: Yes   Substance Use Topics   • Alcohol use: No   • Drug use: Never     Past Surgical HISTORY  Past Surgical History:   Procedure Laterality Date   • Appendectomy         REVIEW OF SYSTEMS:  GENERAL: Denies fever, chills, or appetite changes, weight changes    SKIN: Denies any concerning lesions, rash or changes  EYES: Denies visual problems.  ENT: Denies hearing or nasal concerns; difficulty swallowing.  CARDIOVASCULAR: Denies chest pain, palpitations, fatigue or peripheral edema.  RESPIRATORY: Denies shortness of breath, cough.  GI: Denies for diarrhea, nausea, vomiting, diarrhea, constipation, bloody stools, rare heart burn. Denies abdominal pain  : Denies urinary concerns.  MUSCULOSKELETAL: Denies neck, back and hip pain; Denies any abnormal joint or muscle pain.   NEUROLOGIC: Denies headache, dizziness, numbness, gait, seizure or memory problems.  PSYCHOLOGICAL: Denies changes in depression and anxiety. Denies sleep disturbance.       OBJECTIVE:  Visit Vitals  /72 (BP Location: RUE - Right upper extremity, Patient Position: Sitting, Cuff Size: Regular)   Pulse 84   Ht 5' 8\" (1.727 m)   Wt 74.7 kg (164 lb 9.6 oz)   LMP 04/20/2023   BMI 25.03 kg/m²       PHYSICAL EXAM:  Constitutional: Alert, in no acute distress and current vital signs reviewed.  Head and Face: Atraumatic, no deformities, normocephalic, normal facies.  Eyes: No discharge, normal conjunctiva, no eyelid swelling, no ptosis and the sclerae were normal. Pupils equal, round and reactive to light and accommodation, conjugate gaze and extraocular movements were intact.  ENT: Normal appearing outer ear, normal appearing nose. Examination of the tympanic membrane showed normal landmarks, normal appearing external canal. Nasal mucosa moist and pink, no nasal discharge. Normal  lips. Oral mucosa pink and moist, no oral lesions.  Neck: Normal appearing neck, supple neck and no mass was seen. Thyroid not enlarged and no thyroid nodules. No lymphadenopathy.  Pulmonary: No respiratory distress, normal respiratory rate and effort and no accessory muscle use. Breath sounds clear to auscultation bilaterally.  Cardiovascular: Normal rate, no murmurs were heard, regular rhythm, normal S1 and normal S2. Edema was not present in the lower extremities.  Abdomen: Soft, nontender, nondistended, normal bowel sounds and no abdominal mass. No hepatomegaly and no splenomegaly. No umbilical hernia was discovered.  Musculoskeletal: Right knee discomfort to the MCL with maximal flexion and internal rotation.  Neurologic: Cranial nerves grossly intact. Normal Deep tendon reflexes. No sensory deficits noted. No coordination deficits.  Psychiatric: Oriented to person, oriented to place and oriented to time. Alert and awake, interactive and mood/affect were appropriate. Judgement not impaired. Normal attention span. Short term memory intact.  Skin, Hair, Nails: Normal skin color and pigmentation and no rash. No foot ulcers and no skin ulcer was seen. Normal skin turgor. No clubbing or cyanosis of the fingernails.      ASSESSMENT AND PLAN:  This 19 year old female presents with :  1. Annual physical exam    2. POTS (postural orthostatic tachycardia syndrome)    3. Convulsions, unspecified convulsion type (CMD)    4. Acute pain of right knee    5. Bipolar affective disorder, currently depressed, moderate (CMD)    6. Generalized anxiety disorder    7. Moderate episode of recurrent major depressive disorder (CMD)        Orders Placed This Encounter   • SERVICE TO CARDIOLOGY       PLAN:    1. POTS (postural orthostatic tachycardia syndrome):  Discussed working on hydration and salt intake to be the treatment options.  Referral provided to SERVICE TO CARDIOLOGY for further evaluation.      2. Annual physical  exam:  Patient understands and agrees with the plan.     Right knee pain:  Discussed her knee likely to be twisted.  Pain is improving.  Functionality normal.  She is comfortable continuing to monitor. Notify me of any worsening/new symptoms.     Convulsions, likely non epileptic.  She has not improvement since starting on medications for her anxiety, depression and bipolar. She will continue to monitor.     Bipolar, depression, anxiety:  Continue follow up with psychiatry. Continue current medications.     Follow up in 1 year for physical or sooner if needed.    Refer to orders.    Patient understands and agrees with the plan.  Return to clinic as clinically indicated as discussed with the patient who verbalized understanding of the plan and is in agreement with the plan.    I,  Dr. Omar Sweeney, have created a visit summary document based on the audio recording between LILIAN Brown and this patient for the physician to review, edit as needed, and authenticate.    Creation Date: 5/12/2023     I have reviewed and edited the visit summary above and attest that it is accurate.    LILIAN Day  5/12/23   Statement Selected

## 2024-03-01 NOTE — OB PROVIDER H&P - HISTORY OF PRESENT ILLNESS
32yo , @16w2d, FORREST 24 by first trimester sono, with h/o T2DM, asthma, obesity, presenting for  admission for glycemic control.  Pregnancy conceived with ovulation induction on clomid. Patient does not check finger sticks regularly, which she attributes to her busy schedule.  She states when she does check her FS, FFS will range from 100-120s, and 2hPP is frequently > 200.  She only takes her insulin when she remembers; [facility] changed insulin regime yesterday, currently Detemir 10U before breakfast; 12Units before dinner. Small hs snack. She did not take any insulin today. She last ate at 1830, pizza; typical diet does include pasta, rice, and pizza, occasional salmon.  Currently denying any abdominal pain, lof, vb. Denies fevers, chills, nausea/vomiting. Denies lightheadedness dizziness, excessive thirst.      Pregnancy Complications:  # T2DM:   - last A1c 9.1% (23)  - Detemir 10U before breakfast; 12Units before dinner. Small hs snack.  - Lispro: 3 Units ac break; 3 Units ac Lunch; 3 Units ac supper.  - FFS will range from 100-120s, and 2hPP is frequently > 200  - noncompliant with insulin and FS    # Asthma   - Albuterol PRN, Symbicort daily, and Singular daily.  - denies h/o hospitalizations or intubations   - No current CP/SOB, wheezing     # HSV   - On Valtrex 1g QD   - Denies any current outbreaks, lesions, cold or flu-like symptoms     # Social Issues   - Works at [employer] with odd hours making it difficult to take FS and eat diabetic diet   - Lack of social support; FOB lives with another woman. States she only has one friend, but is willing to help her.    - Adopted; adopted mother passed 2 mo ago (liver cancer), adopted father passed years ago (diabetes), biological mother struggles with addiction.    - Lives alone with 2 pets.   - Declined SW when offered in [facility] clinic

## 2024-03-01 NOTE — OB PROVIDER H&P - NSHPLABSRESULTS_GEN_ALL_CORE
Labs:  12/28  A1c 9.1  O neg  antibody neg  Hep B NR  Hep C NR  Rubella: equivocal  Frag X: neg  SMA: neg  TB Quant: Neg  CF neg  ?  OBUS:  1/25: S=D based on 12/28 sono  2/7: Normal NT screen  3/1: 16w2d Normal early anatomy  @16w2d: variable, ant placenta, MVP 4.36, incomplete anatomy however no major fetal malformations seen

## 2024-03-01 NOTE — OB PROVIDER H&P - ASSESSMENT
33y , @16w2d, with PMHx T2DM, asthma, and obesity, admitted for antepartum glycemic control.      # T2DM:   - Current Insulin: Detemir 10U before breakfast; 12Units before dinner. Small hs snack.; Lispro: 3 Units ac break; 3 Units ac Lunch; 3 Units ac supper. (ordered)   - FS fasting and 2h PP   - will start 24h UTP   - f/u CBC, CMP, A1c, TSH with reflex to FT4  - f/u EKG  - c/w Aspirin 81 mg PO daily.  - CC diet with evening snack     # Asthma   - c/w Symbicort, Singulair, and Albuterol     # Left eyelid stye   - Opthalmology consult placed   - Cortisporin eye ointment ordered q8h     # Eczema   - f/u dermatology consult     # Pregnancy     D/w Dr. Jaramillo To be discussed with MFM attending    33y , @16w2d, with PMHx T2DM, asthma, and obesity, admitted for antepartum glycemic control.      # T2DM:   - Current Insulin: Detemir 10U before breakfast; 12Units before dinner. Small hs snack.; Lispro: 3 Units ac break; 3 Units ac Lunch; 3 Units ac supper. (ordered)   - FS fasting, preprandial, and 2h PP   - will start 24h UTP   - f/u CBC, CMP, A1c, TSH with reflex to FT4  - f/u EKG  - c/w Aspirin 81 mg PO daily.  - CC diet with evening snack     # Asthma   - c/w Symbicort, Singulair, and Albuterol     # Left eyelid stye   - Opthalmology consult placed   - Cortisporin eye ointment ordered q8h     # Eczema   - Dermatology consult placed    # Pregnancy   - Prenatal vitamins  - Ambulate as tolerated  - Vitals per routine  - Transfer to the floors    D/w Dr. Jaramillo To be discussed with MFM attending

## 2024-03-02 LAB
A1C WITH ESTIMATED AVERAGE GLUCOSE RESULT: 6.6 % — HIGH (ref 4–5.6)
ESTIMATED AVERAGE GLUCOSE: 143 MG/DL — HIGH (ref 68–114)
GLUCOSE BLDC GLUCOMTR-MCNC: 114 MG/DL — HIGH (ref 70–99)
GLUCOSE BLDC GLUCOMTR-MCNC: 115 MG/DL — HIGH (ref 70–99)
GLUCOSE BLDC GLUCOMTR-MCNC: 120 MG/DL — HIGH (ref 70–99)
GLUCOSE BLDC GLUCOMTR-MCNC: 94 MG/DL — SIGNIFICANT CHANGE UP (ref 70–99)
T4 AB SER-ACNC: 9 UG/DL — SIGNIFICANT CHANGE UP (ref 4.6–12)
TSH SERPL-MCNC: 1.87 UIU/ML — SIGNIFICANT CHANGE UP (ref 0.27–4.2)

## 2024-03-02 PROCEDURE — 99232 SBSQ HOSP IP/OBS MODERATE 35: CPT

## 2024-03-02 RX ADMIN — Medication 81 MILLIGRAM(S): at 11:22

## 2024-03-02 RX ADMIN — BACITRACIN ZINC AND POLYMYXIN B SULFATE 1 APPLICATION(S): 500; 10000 OINTMENT OPHTHALMIC at 14:10

## 2024-03-02 RX ADMIN — Medication 3 UNIT(S): at 16:41

## 2024-03-02 RX ADMIN — Medication 12 UNIT(S): at 17:21

## 2024-03-02 RX ADMIN — Medication 3 UNIT(S): at 11:23

## 2024-03-02 RX ADMIN — BACITRACIN ZINC AND POLYMYXIN B SULFATE 1 APPLICATION(S): 500; 10000 OINTMENT OPHTHALMIC at 05:37

## 2024-03-02 RX ADMIN — BACITRACIN ZINC AND POLYMYXIN B SULFATE 1 APPLICATION(S): 500; 10000 OINTMENT OPHTHALMIC at 21:12

## 2024-03-02 RX ADMIN — Medication 10 UNIT(S): at 11:21

## 2024-03-02 RX ADMIN — Medication 1 TABLET(S): at 11:22

## 2024-03-02 RX ADMIN — VALACYCLOVIR 1000 MILLIGRAM(S): 500 TABLET, FILM COATED ORAL at 11:22

## 2024-03-02 RX ADMIN — MONTELUKAST 10 MILLIGRAM(S): 4 TABLET, CHEWABLE ORAL at 11:22

## 2024-03-02 RX ADMIN — Medication 3 UNIT(S): at 08:07

## 2024-03-02 RX ADMIN — BUDESONIDE AND FORMOTEROL FUMARATE DIHYDRATE 2 PUFF(S): 160; 4.5 AEROSOL RESPIRATORY (INHALATION) at 08:10

## 2024-03-02 NOTE — CONSULT NOTE ADULT - ASSESSMENT
A/P:  32 yo pregnant female (16 wk) with pmhx of Asthma, DM and HSV admitted for antepartum glycemic control with h/o eczema and rosacea x years c/o itchy rashes on the blt UE and erythematous xerotic patches and papules blt face/ nose:   1) Atopic Dermatitis on face and arms: moderate  - consider mild to moderate topical steroids for eczema control like hydrocortisone cream 2.5 % on face/ around nose qam and mometasone cream or ointment twice a day on the affected areas on  arms. If not better, consider an oral antihistamine like Benadryl.   -Can use vaseline, aquaphor or cetaphil ointment twice a day for dry skin on affected areas face and extremities        counseling for sensitive skin done: change soap from Dial to Aveeno, Cetaphil or Dove.   The patient should continue care with her outpatient dermatologist.     2) Rosacea acne: moderate. Due to pregnancy, consider erythromycin ointment bid or azelaic acid qd to affected area when patient is bothered by it. Erythromycin ointment can also be used for the skin excoriations and for the L upper eyelid cyst/ stye/ follow Optho recommendations for eyelid lesion.  3) Continue other care as per Ob/Gyn / Medical team.     Please contact me for any questions or if the condition tets worse.

## 2024-03-02 NOTE — CONSULT NOTE ADULT - SUBJECTIVE AND OBJECTIVE BOX
34 yo pregnant female (16 wk) admitted for antepartum glycemic control with h/o eczema x years  c/o itchy rashes on the arms, worse in the winter. Uses Dial soap. Also used older tube of fluocinonide cream on the arms but only once with little relief. Last time she saw her dermatologist was years ago. No fever, no chills, No joint pains. No SOB. Resting comfortably. Denies mucosal lesions and trunk lesions, no blisters.   Patient also has a h/o rosacea x many years not using medications c/o red patches on nose and cheeks x years now more dry around the nose, not using medications for it.  Reports h/o genital HSV with no episodes in the past 6 months, is on Valacyclovir 1 gh qd. Also with L upper eyelid cyst for weeks, now on bacitracin/ polymyxin B opththalmic ointment  PMHx: Asthma, PCOS, DM2  Meds: reviewed  NKDA  Meds: reviewed  PE (focused exam): Alert/ NAD (done by bedside 3/2,  3:45 pm)  - eryth xerotic patches and papules on blt cheeks and nose;  - ~0.5 cm eryth pap on L upper eyelid  - eryth patches and papules with excoriations worst on blt antecubital fossae  Labs: NC

## 2024-03-02 NOTE — PROGRESS NOTE ADULT - SUBJECTIVE AND OBJECTIVE BOX
Reason for Admission:     Gestational Age: 16w2d  Hospital Day: 2      Pt seen and examined at bedside, no complaints. She denies any lof, vb, cx, headache, dizziness, weakness, abdominal pain, palpitations, or diaphoresis.     Ambulating: Yes  Voiding: Yes  Flatus: Yes  Bowel movements: No  Diet: Regular    MEDICATIONS  (STANDING):  aspirin  chewable 81 milliGRAM(s) Oral daily  bacitracin/polymyxin B Ophthalmic Ointment 1 Application(s) Left EYE three times a day  budesonide  80 MICROgram(s)/formoterol 4.5 MICROgram(s) Inhaler 2 Puff(s) Inhalation daily  dextrose 5%. 1000 milliLiter(s) (50 mL/Hr) IV Continuous <Continuous>  dextrose 5%. 1000 milliLiter(s) (100 mL/Hr) IV Continuous <Continuous>  dextrose 50% Injectable 25 Gram(s) IV Push once  dextrose 50% Injectable 12.5 Gram(s) IV Push once  dextrose 50% Injectable 25 Gram(s) IV Push once  glucagon  Injectable 1 milliGRAM(s) IntraMuscular once  insulin detemir injectable (LEVEMIR) 10 Unit(s) SubCutaneous before breakfast  insulin detemir injectable (LEVEMIR) 12 Unit(s) SubCutaneous with dinner  insulin lispro Injectable (ADMELOG) 3 Unit(s) SubCutaneous before breakfast  insulin lispro Injectable (ADMELOG) 3 Unit(s) SubCutaneous before dinner  insulin lispro Injectable (ADMELOG) 3 Unit(s) SubCutaneous before lunch  montelukast 10 milliGRAM(s) Oral daily  prenatal multivitamin 1 Tablet(s) Oral daily  valACYclovir 1000 milliGRAM(s) Oral daily    MEDICATIONS  (PRN):  albuterol    90 MICROgram(s) HFA Inhaler 2 Puff(s) Inhalation every 6 hours PRN Shortness of Breath and/or Wheezing  dextrose Oral Gel 15 Gram(s) Oral once PRN Blood Glucose LESS THAN 70 milliGRAM(s)/deciliter      PAST MEDICAL & SURGICAL HISTORY:  Asthma  Diabetes mellitus, type 2  Eczema  PCOS (polycystic ovarian syndrome)  No significant past surgical history    Physical Exam:   Vital Signs Last 24 Hrs  T(C): 36.3 (02 Mar 2024 15:00), Max: 37.1 (01 Mar 2024 18:52)  T(F): 97.4 (02 Mar 2024 15:00), Max: 98.7 (01 Mar 2024 18:52)  HR: 84 (02 Mar 2024 15:00) (84 - 100)  BP: 100/52 (02 Mar 2024 15:00) (100/52 - 119/58)  RR: 18 (02 Mar 2024 15:00) (18 - 20)      Gen: AOx3, NAD   Cardio: RRR, S1S2, no m/r/g  Pulm: CTA b/l  Abdomen: soft, nontender, no palpable contractions     Labs:                        11.8   8.69  )-----------( 238      ( 01 Mar 2024 18:56 )             37.3

## 2024-03-02 NOTE — PROGRESS NOTE ADULT - ATTENDING COMMENTS
Patient seen and examined at bedside in [facility], denies any complaints at this time. Will follow FS and titrate insulin regimen as need. Derm consult pending.

## 2024-03-02 NOTE — PROGRESS NOTE ADULT - ASSESSMENT
33y , @16w3d, with PMHx T2DM, asthma, and obesity, admitted for antepartum glycemic control.      # T2DM:   - Current Insulin: Detemir 10U before breakfast; 12Units before dinner. Small hs snack.; Lispro: 3 Units ac break; 3 Units ac Lunch; 3 Units ac supper. (ordered)   - FS fasting, preprandial, and 2h PP   - will start 24h UTP   - c/w Aspirin 81 mg PO daily.  - CC diet with evening snack     # Asthma   - c/w Symbicort, Singulair, and Albuterol     # Left eyelid stye   - Opthalmology consult placed   - Cortisporin eye ointment ordered q8h     # Eczema   - Dermatology consult placed    # Pregnancy   - Prenatal vitamins  - Ambulate as tolerated  - Vitals per routine    Dr. Chang aware

## 2024-03-03 LAB
COLLECT DURATION TIME UR: 24 HR — SIGNIFICANT CHANGE UP
GLUCOSE BLDC GLUCOMTR-MCNC: 131 MG/DL — HIGH (ref 70–99)
GLUCOSE BLDC GLUCOMTR-MCNC: 143 MG/DL — HIGH (ref 70–99)
GLUCOSE BLDC GLUCOMTR-MCNC: 91 MG/DL — SIGNIFICANT CHANGE UP (ref 70–99)
PROT 24H UR-MRATE: 147 MG/24 H — HIGH (ref 50–100)
TOTAL VOLUME - 24 HOUR: 2100 ML — SIGNIFICANT CHANGE UP
URINE CREATININE CALCULATION: 1 G/24 HR — SIGNIFICANT CHANGE UP (ref 0.8–1.8)

## 2024-03-03 PROCEDURE — 99233 SBSQ HOSP IP/OBS HIGH 50: CPT

## 2024-03-03 RX ORDER — HYDROCORTISONE 1 %
1 OINTMENT (GRAM) TOPICAL DAILY
Refills: 0 | Status: DISCONTINUED | OUTPATIENT
Start: 2024-03-03 | End: 2024-03-04

## 2024-03-03 RX ADMIN — Medication 3 UNIT(S): at 11:32

## 2024-03-03 RX ADMIN — Medication 12 UNIT(S): at 17:42

## 2024-03-03 RX ADMIN — Medication 10 UNIT(S): at 07:39

## 2024-03-03 RX ADMIN — Medication 3 UNIT(S): at 16:14

## 2024-03-03 RX ADMIN — BACITRACIN ZINC AND POLYMYXIN B SULFATE 1 APPLICATION(S): 500; 10000 OINTMENT OPHTHALMIC at 13:15

## 2024-03-03 RX ADMIN — Medication 3 UNIT(S): at 07:38

## 2024-03-03 RX ADMIN — Medication 1 APPLICATION(S): at 17:07

## 2024-03-03 RX ADMIN — Medication 81 MILLIGRAM(S): at 11:31

## 2024-03-03 RX ADMIN — VALACYCLOVIR 1000 MILLIGRAM(S): 500 TABLET, FILM COATED ORAL at 11:32

## 2024-03-03 RX ADMIN — BACITRACIN ZINC AND POLYMYXIN B SULFATE 1 APPLICATION(S): 500; 10000 OINTMENT OPHTHALMIC at 05:24

## 2024-03-03 RX ADMIN — BUDESONIDE AND FORMOTEROL FUMARATE DIHYDRATE 2 PUFF(S): 160; 4.5 AEROSOL RESPIRATORY (INHALATION) at 07:44

## 2024-03-03 RX ADMIN — BACITRACIN ZINC AND POLYMYXIN B SULFATE 1 APPLICATION(S): 500; 10000 OINTMENT OPHTHALMIC at 21:13

## 2024-03-03 RX ADMIN — Medication 1 TABLET(S): at 11:32

## 2024-03-03 RX ADMIN — Medication 1 APPLICATION(S): at 13:14

## 2024-03-03 RX ADMIN — MONTELUKAST 10 MILLIGRAM(S): 4 TABLET, CHEWABLE ORAL at 11:32

## 2024-03-03 NOTE — PROGRESS NOTE ADULT - SUBJECTIVE AND OBJECTIVE BOX
Gestational Age: 16w3d  Hospital Day: 3    Pt seen and examined at bedside, no complaints. Denies contractions, vaginal bleeding, leakage of fluids.    MEDICATIONS  (STANDING):  aspirin  chewable 81 milliGRAM(s) Oral daily  bacitracin/polymyxin B Ophthalmic Ointment 1 Application(s) Left EYE three times a day  budesonide  80 MICROgram(s)/formoterol 4.5 MICROgram(s) Inhaler 2 Puff(s) Inhalation daily  insulin detemir injectable (LEVEMIR) 10 Unit(s) SubCutaneous before breakfast  insulin detemir injectable (LEVEMIR) 12 Unit(s) SubCutaneous with dinner  insulin lispro Injectable (ADMELOG) 3 Unit(s) SubCutaneous before breakfast  insulin lispro Injectable (ADMELOG) 3 Unit(s) SubCutaneous before dinner  insulin lispro Injectable (ADMELOG) 3 Unit(s) SubCutaneous before lunch  montelukast 10 milliGRAM(s) Oral daily  prenatal multivitamin 1 Tablet(s) Oral daily  valACYclovir 1000 milliGRAM(s) Oral daily    MEDICATIONS  (PRN):  albuterol    90 MICROgram(s) HFA Inhaler 2 Puff(s) Inhalation every 6 hours PRN Shortness of Breath and/or Wheezing  dextrose Oral Gel 15 Gram(s) Oral once PRN Blood Glucose LESS THAN 70 milliGRAM(s)/deciliter    PAST MEDICAL & SURGICAL HISTORY:  Asthma  Diabetes mellitus, type 2  Eczema  PCOS (polycystic ovarian syndrome)  No significant past surgical history    Vital Signs Last 24 Hrs  T(C): 36.7 (02 Mar 2024 23:43), Max: 36.7 (02 Mar 2024 23:43)  T(F): 98.1 (02 Mar 2024 23:43), Max: 98.1 (02 Mar 2024 23:43)  HR: 85 (02 Mar 2024 23:43) (84 - 86)  BP: 95/54 (02 Mar 2024 23:43) (95/54 - 119/58)  RR: 18 (02 Mar 2024 23:43) (18 - 18)    Physical Exam  Gen: AOx3, NAD   Cardio: RRR, S1S2, no m/r/g  Pulm: CTA b/l  Abdomen: soft, nontender, no palpable contractions     Labs:                        11.8   8.69  )-----------( 238      ( 01 Mar 2024 18:56 )             37.3  Gestational Age: 16w3d  Hospital Day: 3    Pt seen and examined at bedside, no complaints. Denies contractions, vaginal bleeding, leakage of fluids.    MEDICATIONS  (STANDING):  aspirin  chewable 81 milliGRAM(s) Oral daily  bacitracin/polymyxin B Ophthalmic Ointment 1 Application(s) Left EYE three times a day  budesonide  80 MICROgram(s)/formoterol 4.5 MICROgram(s) Inhaler 2 Puff(s) Inhalation daily  insulin detemir injectable (LEVEMIR) 10 Unit(s) SubCutaneous before breakfast  insulin detemir injectable (LEVEMIR) 12 Unit(s) SubCutaneous with dinner  insulin lispro Injectable (ADMELOG) 3 Unit(s) SubCutaneous before breakfast  insulin lispro Injectable (ADMELOG) 3 Unit(s) SubCutaneous before dinner  insulin lispro Injectable (ADMELOG) 3 Unit(s) SubCutaneous before lunch  montelukast 10 milliGRAM(s) Oral daily  prenatal multivitamin 1 Tablet(s) Oral daily  valACYclovir 1000 milliGRAM(s) Oral daily    MEDICATIONS  (PRN):  albuterol    90 MICROgram(s) HFA Inhaler 2 Puff(s) Inhalation every 6 hours PRN Shortness of Breath and/or Wheezing  dextrose Oral Gel 15 Gram(s) Oral once PRN Blood Glucose LESS THAN 70 milliGRAM(s)/deciliter    PAST MEDICAL & SURGICAL HISTORY:  Asthma  Diabetes mellitus, type 2  Eczema  PCOS (polycystic ovarian syndrome)  No significant past surgical history    Vital Signs Last 24 Hrs  T(C): 36.7 (02 Mar 2024 23:43), Max: 36.7 (02 Mar 2024 23:43)  T(F): 98.1 (02 Mar 2024 23:43), Max: 98.1 (02 Mar 2024 23:43)  HR: 85 (02 Mar 2024 23:43) (84 - 86)  BP: 95/54 (02 Mar 2024 23:43) (95/54 - 119/58)  RR: 18 (02 Mar 2024 23:43) (18 - 18)    Physical Exam  Gen: AOx3, NAD   Cardio: RRR, S1S2, no m/r/g  Pulm: CTA b/l  Abdomen: soft, nontender, no palpable contractions     Labs:             11.8   8.69  )-----------( 238      ( 01 Mar 2024 18:56 )             37.3       03-01    139  |  106  |  16  ----------------------------<  144  4.0   |  23  |  0.6    Ca    9.3      01 Mar 2024 18:56   Mg    x   01 Mar 2024 18:56  Phos    x    01 Mar 2024 18:56    TPro  6.2  /  Alb  3.8  /  TBili  <0.2  /  DBili  x   /  AST  16  /  ALT  26  /  AlkPhos  70  03-01-24      Lactate dehydrogenase  x     03-01-24  Uric acid    x    03-01-24    Protein Total, Urine 24 Hour (03.02.24 @ 22:00)    Urine Creatinine Calculation: 1.0 g/24 hr   Total Volume - 24 Hour: 2100 mL   Protein Saul. Calculation: 147 mg/24 h   Interval Timed: 24 HR    A1C with Estimated Average Glucose (03.01.24 @ 18:56)    A1C with Estimated Average Glucose Result: 6.6:          CAPILLARY BLOOD GLUCOSE      POCT Blood Glucose.: 143 mg/dL (03 Mar 2024 14:10)  POCT Blood Glucose.: 91 mg/dL (03 Mar 2024 11:15)  POCT Blood Glucose.: 120 mg/dL (02 Mar 2024 19:52)

## 2024-03-03 NOTE — PROGRESS NOTE ADULT - ASSESSMENT
A/P: 34yo , @16w4d, with PMHx T2DM, asthma, and obesity, admitted for antepartum glycemic control.      # T2DM:   - Current Insulin: Detemir 10U before breakfast; 12Units before dinner. Small hs snack.; Lispro: 3 Units ac break; 3 Units ac Lunch; 3 Units ac supper. (ordered)   - FS fasting, preprandial, and 2h PP   - will start 24h UTP   - c/w Aspirin 81 mg PO daily.  - CC diet with evening snack     # Asthma   - c/w Symbicort, Singulair, and Albuterol     # Left eyelid stye   - Opthalmology consult placed   - Cortisporin eye ointment ordered q8h     # Eczema   - Dermatology consult placed    # Pregnancy   - Prenatal vitamins  - Ambulate as tolerated  - Vitals per routine A/P: Ms. Negro is a 34yo , @16w4d, with PMHx T2DM, asthma, and obesity, admitted for antepartum glycemic control.      # T2DM:   - Current Insulin: Detemir 10U before breakfast; 12Units before dinner. Small hs snack.; Lispro: 3 Units ac break; 3 Units ac Lunch; 3 Units ac supper. (ordered)   - FS fasting, preprandial, and 2h PP   - will start 24h UTP   - c/w Aspirin 81 mg PO daily.  - CC diet with evening snack     # Asthma   - c/w Symbicort, Singulair, and Albuterol     # Left eyelid stye   - Opthalmology consult placed   - Cortisporin eye ointment ordered q8h     # Eczema   - Appreciate Dermatology input       # Pregnancy   - Prenatal vitamins  - Ambulate as tolerated  - Vitals per routine     A/P: Ms. Negro is a 32yo , @16w4d, with PMHx T2DM, asthma, and obesity, admitted for antepartum glycemic control.      # T2DM:   - Current Insulin: Detemir 10U before breakfast; 12Units before dinner. Small hs snack.; Lispro: 3 Units ac breakfast; 3 Units ac Lunch; 3 Units ac supper. (ordered)   - FS fasting, preprandial, and 2h PP   - will start 24h UTP   - c/w Aspirin 81 mg PO daily.  - CC diet with evening snack     # Asthma   - c/w Symbicort, Singulair, and Albuterol     # Left eyelid stye   - Opthalmology consult placed   - Cortisporin eye ointment ordered q8h     # Eczema   - Appreciate Dermatology input       # Pregnancy   - Prenatal vitamins  - Ambulate as tolerated  - Vitals per routine

## 2024-03-03 NOTE — PROGRESS NOTE ADULT - ATTENDING COMMENTS
[facility] Staff    I saw and evaluated Ms. TAYLOR JAY  with Dr. Sethi  Ms. TAYLOR JAY reports positive fetal movement and no vaginal bleeding.    General:  Ms. TAYLOR JAY is lying in bed.  She appears comfortable.    Vital Signs Last 24 Hrs  T(C): 36.5 (03 Mar 2024 15:00), Max: 36.7 (02 Mar 2024 23:43)  T(F): 97.7 (03 Mar 2024 15:00), Max: 98.1 (02 Mar 2024 23:43)  HR: 88 (03 Mar 2024 15:00) (85 - 88)  BP: 92/55 (03 Mar 2024 15:00) (92/55 - 103/55)  BP(mean): --  RR: 18 (03 Mar 2024 15:00) (18 - 18)  SpO2: --    Cardiovascular:  Normal S1 S2.  No murmurs.  Pulmonary:  Clear to auscultation bilaterally.  No wheezing.  Abdomen:  Soft, nontender, nondistended, no rebound, no guarding.  Extremities:  Nontender calves.  Neurology:  Deep tendon reflexes 2+ bilaterally.    Labs as above.    Ms. Jay is a 32yo , @16w4d, with PMHx T2DM, asthma, and obesity with poor follow up, admitted for antepartum glycemic control.      # T2DM:   - Current Insulin: Detemir 10U before breakfast; 12Units before dinner. Small hs snack.; Lispro: 3 Units ac break; 3 Units ac Lunch; 3 Units ac supper. (ordered)   - FS fasting, preprandial, and 2h PP   - Fingersticks are normal to mildly elevated, however it is not clear to me when the fingersticks are being checked.  - Cardiology referral as an outpatient.  - Dilated eye exam and foot exam.  - c/w Aspirin 81 mg PO daily.    # Asthma   - c/w Symbicort, Singulair, and Albuterol     # Left eyelid stye   - Opthalmology consult placed   - Cortisporin eye ointment ordered q8h     # Eczema   -  -Hydrocortisone cream 2.5 % on face/ around nose qam       -Mometasone cream or ointment twice a day on the affected areas on  arms.     #Social  - Ms Jay stated that part of the reason she has not been following up is because her insurance lapsed.  It will be reinstated 2024.      # Pregnancy   - Prenatal vitamins  - Ambulate as tolerated  - Vitals per routine    Reza Sheppard MD [facility] Staff    I saw and evaluated Ms. TAYLOR JAY  with Dr. Sethi  Ms. TAYLOR JAY reports  no vaginal bleeding.    General:  Ms. TAYLOR JAY is lying in bed.  She appears comfortable.    Vital Signs Last 24 Hrs  T(C): 36.5 (03 Mar 2024 15:00), Max: 36.7 (02 Mar 2024 23:43)  T(F): 97.7 (03 Mar 2024 15:00), Max: 98.1 (02 Mar 2024 23:43)  HR: 88 (03 Mar 2024 15:00) (85 - 88)  BP: 92/55 (03 Mar 2024 15:00) (92/55 - 103/55)  BP(mean): --  RR: 18 (03 Mar 2024 15:00) (18 - 18)  SpO2: --    Cardiovascular:  Normal S1 S2.  No murmurs.  Pulmonary:  Clear to auscultation bilaterally.  No wheezing.  Abdomen:  Soft, nontender, nondistended, no rebound, no guarding.  Extremities:  Nontender calves.  Neurology:  Deep tendon reflexes 2+ bilaterally.    Labs as above.    Ms. Jay is a 34yo , @16w4d, with PMHx T2DM, asthma, and obesity with poor follow up, admitted for antepartum glycemic control.      # T2DM:   - Current Insulin: Detemir 10U before breakfast; 12Units before dinner. Small hs snack.; Lispro: 3 Units ac break; 3 Units ac Lunch; 3 Units ac supper. (ordered)   - FS fasting, preprandial, and 2h PP   - Fingersticks are normal to mildly elevated, however it is not clear to me when the fingersticks are being checked.  - Cardiology referral as an outpatient.  - Dilated eye exam and foot exam.  - c/w Aspirin 81 mg PO daily.    # Asthma   - c/w Symbicort, Singulair, and Albuterol   - Check peak flow.    # Left eyelid stye   - Opthalmology consult placed   - Cortisporin eye ointment ordered q8h     # Eczema   -  -Hydrocortisone cream 2.5 % on face/ around nose qam       -Mometasone cream or ointment twice a day on the affected areas on  arms.     #Social  - Ms Jay stated that part of the reason she has not been following up is because her insurance lapsed.  It will be reinstated 2024.      # Pregnancy   - Prenatal vitamins  - Ambulate as tolerated  - Vitals per routine  - Check TSH and Free T4.    Reza Sheppard MD

## 2024-03-04 ENCOUNTER — TRANSCRIPTION ENCOUNTER (OUTPATIENT)
Age: 34
End: 2024-03-04

## 2024-03-04 VITALS
TEMPERATURE: 98 F | DIASTOLIC BLOOD PRESSURE: 50 MMHG | RESPIRATION RATE: 18 BRPM | SYSTOLIC BLOOD PRESSURE: 106 MMHG | HEART RATE: 84 BPM

## 2024-03-04 DIAGNOSIS — O09.512 SUPERVISION OF ELDERLY PRIMIGRAVIDA, SECOND TRIMESTER: ICD-10-CM

## 2024-03-04 DIAGNOSIS — O24.112 PRE-EXISTING TYPE 2 DIABETES MELLITUS, IN PREGNANCY, SECOND TRIMESTER: ICD-10-CM

## 2024-03-04 DIAGNOSIS — Z3A.16 16 WEEKS GESTATION OF PREGNANCY: ICD-10-CM

## 2024-03-04 DIAGNOSIS — O99.519 DISEASES OF THE RESPIRATORY SYSTEM COMPLICATING PREGNANCY, UNSPECIFIED TRIMESTER: ICD-10-CM

## 2024-03-04 DIAGNOSIS — O99.212 OBESITY COMPLICATING PREGNANCY, SECOND TRIMESTER: ICD-10-CM

## 2024-03-04 DIAGNOSIS — O98.512 OTHER VIRAL DISEASES COMPLICATING PREGNANCY, SECOND TRIMESTER: ICD-10-CM

## 2024-03-04 DIAGNOSIS — O09.519 SUPERVISION OF ELDERLY PRIMIGRAVIDA, UNSPECIFIED TRIMESTER: ICD-10-CM

## 2024-03-04 LAB
ALBUMIN SERPL ELPH-MCNC: 3.6 G/DL — SIGNIFICANT CHANGE UP (ref 3.5–5.2)
ALP SERPL-CCNC: 56 U/L — SIGNIFICANT CHANGE UP (ref 30–115)
ALT FLD-CCNC: 23 U/L — SIGNIFICANT CHANGE UP (ref 0–41)
ANION GAP SERPL CALC-SCNC: 12 MMOL/L — SIGNIFICANT CHANGE UP (ref 7–14)
AST SERPL-CCNC: 13 U/L — SIGNIFICANT CHANGE UP (ref 0–41)
BASOPHILS # BLD AUTO: 0.04 K/UL — SIGNIFICANT CHANGE UP (ref 0–0.2)
BASOPHILS NFR BLD AUTO: 0.5 % — SIGNIFICANT CHANGE UP (ref 0–1)
BILIRUB SERPL-MCNC: <0.2 MG/DL — SIGNIFICANT CHANGE UP (ref 0.2–1.2)
BUN SERPL-MCNC: 11 MG/DL — SIGNIFICANT CHANGE UP (ref 10–20)
CALCIUM SERPL-MCNC: 8.9 MG/DL — SIGNIFICANT CHANGE UP (ref 8.4–10.5)
CHLORIDE SERPL-SCNC: 104 MMOL/L — SIGNIFICANT CHANGE UP (ref 98–110)
CO2 SERPL-SCNC: 22 MMOL/L — SIGNIFICANT CHANGE UP (ref 17–32)
CREAT SERPL-MCNC: 0.5 MG/DL — LOW (ref 0.7–1.5)
EGFR: 125 ML/MIN/1.73M2 — SIGNIFICANT CHANGE UP
EOSINOPHIL # BLD AUTO: 0.2 K/UL — SIGNIFICANT CHANGE UP (ref 0–0.7)
EOSINOPHIL NFR BLD AUTO: 2.3 % — SIGNIFICANT CHANGE UP (ref 0–8)
GLUCOSE BLDC GLUCOMTR-MCNC: 142 MG/DL — HIGH (ref 70–99)
GLUCOSE SERPL-MCNC: 90 MG/DL — SIGNIFICANT CHANGE UP (ref 70–99)
HCT VFR BLD CALC: 36.5 % — LOW (ref 37–47)
HGB BLD-MCNC: 12 G/DL — SIGNIFICANT CHANGE UP (ref 12–16)
IMM GRANULOCYTES NFR BLD AUTO: 0.3 % — SIGNIFICANT CHANGE UP (ref 0.1–0.3)
LYMPHOCYTES # BLD AUTO: 2.36 K/UL — SIGNIFICANT CHANGE UP (ref 1.2–3.4)
LYMPHOCYTES # BLD AUTO: 27.5 % — SIGNIFICANT CHANGE UP (ref 20.5–51.1)
MAGNESIUM SERPL-MCNC: 1.9 MG/DL — SIGNIFICANT CHANGE UP (ref 1.8–2.4)
MCHC RBC-ENTMCNC: 26.9 PG — LOW (ref 27–31)
MCHC RBC-ENTMCNC: 32.9 G/DL — SIGNIFICANT CHANGE UP (ref 32–37)
MCV RBC AUTO: 81.8 FL — SIGNIFICANT CHANGE UP (ref 81–99)
MONOCYTES # BLD AUTO: 0.55 K/UL — SIGNIFICANT CHANGE UP (ref 0.1–0.6)
MONOCYTES NFR BLD AUTO: 6.4 % — SIGNIFICANT CHANGE UP (ref 1.7–9.3)
NEUTROPHILS # BLD AUTO: 5.4 K/UL — SIGNIFICANT CHANGE UP (ref 1.4–6.5)
NEUTROPHILS NFR BLD AUTO: 63 % — SIGNIFICANT CHANGE UP (ref 42.2–75.2)
NRBC # BLD: 0 /100 WBCS — SIGNIFICANT CHANGE UP (ref 0–0)
PHOSPHATE SERPL-MCNC: 4.2 MG/DL — SIGNIFICANT CHANGE UP (ref 2.1–4.9)
PLATELET # BLD AUTO: 227 K/UL — SIGNIFICANT CHANGE UP (ref 130–400)
PMV BLD: 10 FL — SIGNIFICANT CHANGE UP (ref 7.4–10.4)
POTASSIUM SERPL-MCNC: 4 MMOL/L — SIGNIFICANT CHANGE UP (ref 3.5–5)
POTASSIUM SERPL-SCNC: 4 MMOL/L — SIGNIFICANT CHANGE UP (ref 3.5–5)
PROT SERPL-MCNC: 5.8 G/DL — LOW (ref 6–8)
RBC # BLD: 4.46 M/UL — SIGNIFICANT CHANGE UP (ref 4.2–5.4)
RBC # FLD: 14.1 % — SIGNIFICANT CHANGE UP (ref 11.5–14.5)
SODIUM SERPL-SCNC: 138 MMOL/L — SIGNIFICANT CHANGE UP (ref 135–146)
T4 FREE SERPL-MCNC: 1 NG/DL — SIGNIFICANT CHANGE UP (ref 0.9–1.8)
TSH SERPL-MCNC: 2.5 UIU/ML — SIGNIFICANT CHANGE UP (ref 0.27–4.2)
WBC # BLD: 8.58 K/UL — SIGNIFICANT CHANGE UP (ref 4.8–10.8)
WBC # FLD AUTO: 8.58 K/UL — SIGNIFICANT CHANGE UP (ref 4.8–10.8)

## 2024-03-04 PROCEDURE — 99238 HOSP IP/OBS DSCHRG MGMT 30/<: CPT

## 2024-03-04 RX ORDER — ALBUTEROL 90 UG/1
2 AEROSOL, METERED ORAL
Qty: 0 | Refills: 0 | DISCHARGE
Start: 2024-03-04

## 2024-03-04 RX ORDER — INSULIN DETEMIR 100/ML (3)
55 INSULIN PEN (ML) SUBCUTANEOUS
Refills: 0 | DISCHARGE
Start: 2024-03-04

## 2024-03-04 RX ORDER — NEOMYCIN/POLYMYXIN B/DEXAMETHA 0.1 %
1 SUSPENSION, DROPS(FINAL DOSAGE FORM)(ML) OPHTHALMIC (EYE) EVERY 6 HOURS
Refills: 0 | Status: DISCONTINUED | OUTPATIENT
Start: 2024-03-04 | End: 2024-03-04

## 2024-03-04 RX ORDER — HYDROCORTISONE 1 %
1 OINTMENT (GRAM) TOPICAL
Qty: 0 | Refills: 0 | DISCHARGE
Start: 2024-03-04

## 2024-03-04 RX ORDER — INSULIN LISPRO 100/ML
14 VIAL (ML) SUBCUTANEOUS
Refills: 0 | DISCHARGE
Start: 2024-03-04

## 2024-03-04 RX ORDER — ASPIRIN/CALCIUM CARB/MAGNESIUM 324 MG
1 TABLET ORAL
Qty: 0 | Refills: 0 | DISCHARGE
Start: 2024-03-04

## 2024-03-04 RX ORDER — VALACYCLOVIR 500 MG/1
1 TABLET, FILM COATED ORAL
Qty: 0 | Refills: 0 | DISCHARGE
Start: 2024-03-04

## 2024-03-04 RX ORDER — NEOMYCIN/POLYMYXIN B/DEXAMETHA 0.1 %
1 SUSPENSION, DROPS(FINAL DOSAGE FORM)(ML) OPHTHALMIC (EYE)
Qty: 1 | Refills: 2
Start: 2024-03-04

## 2024-03-04 RX ADMIN — Medication 1 APPLICATION(S): at 06:23

## 2024-03-04 RX ADMIN — Medication 1 APPLICATION(S): at 11:20

## 2024-03-04 RX ADMIN — Medication 3 UNIT(S): at 11:21

## 2024-03-04 RX ADMIN — MONTELUKAST 10 MILLIGRAM(S): 4 TABLET, CHEWABLE ORAL at 11:19

## 2024-03-04 RX ADMIN — Medication 10 UNIT(S): at 07:34

## 2024-03-04 RX ADMIN — Medication 81 MILLIGRAM(S): at 11:19

## 2024-03-04 RX ADMIN — BUDESONIDE AND FORMOTEROL FUMARATE DIHYDRATE 2 PUFF(S): 160; 4.5 AEROSOL RESPIRATORY (INHALATION) at 07:35

## 2024-03-04 RX ADMIN — Medication 1 TABLET(S): at 11:19

## 2024-03-04 RX ADMIN — BACITRACIN ZINC AND POLYMYXIN B SULFATE 1 APPLICATION(S): 500; 10000 OINTMENT OPHTHALMIC at 06:22

## 2024-03-04 RX ADMIN — VALACYCLOVIR 1000 MILLIGRAM(S): 500 TABLET, FILM COATED ORAL at 11:19

## 2024-03-04 RX ADMIN — Medication 3 UNIT(S): at 07:46

## 2024-03-04 NOTE — DISCHARGE NOTE NURSING/CASE MANAGEMENT/SOCIAL WORK - PATIENT PORTAL LINK FT
You can access the FollowMyHealth Patient Portal offered by [facility] by registering at the following website: [email or web address]. By joining [facility]’s FollowMyHealth portal, you will also be able to view your health information using other applications (apps) compatible with our system.

## 2024-03-04 NOTE — DISCHARGE NOTE PROVIDER - NSDCCPCAREPLAN_GEN_ALL_CORE_FT
PRINCIPAL DISCHARGE DIAGNOSIS  Diagnosis: T2DM (type 2 diabetes mellitus)  Assessment and Plan of Treatment:

## 2024-03-04 NOTE — CHART NOTE - NSCHARTNOTEFT_GEN_A_CORE
Picture of the eye was sent to the Eye Clinic Team  The patient Upper LEFT Eye Lid, towards temporal side has a swollen Internal Hordeolum with a white head  Discuss case with Dr. Vasquez.    Patient was on opthalmic ointment.     Recommended to Discontinue ointment and change to Maxitrol  as long as GYN approves (steroid use)   Recommended time is 4 x day ( q6h)  Warm Compresses as often as possible.     Please reach out if needed  If no improvement consider drainage with Opthalmology

## 2024-03-04 NOTE — DISCHARGE NOTE PROVIDER - NSDCCAREPROVSEEN_GEN_ALL_CORE_FT
Reza Sheppard    [facility] appointment: Monday, March 11 @ 1pm  Donis Richey: Monday, March 11 @ 11am @ [facility]

## 2024-03-04 NOTE — PROGRESS NOTE ADULT - SUBJECTIVE AND OBJECTIVE BOX
PGY 3 Note    Reason for Admission: poorly controlled diabetes    Gestational Age: 16.5  Hospital Day: 4      Pt seen and examined at bedside, no complaints. Denies cramping, vaginal bleeding, leakage of fluids.  Pt denies of headache, dizziness, weakness, abdominal pain, palpitations, or diaphoresis.     Ambulating: Yes  Voiding: Yes  Flatus: Yes  Bowel movements: Yes   Diet: Regular    MEDICATIONS  (STANDING):  aspirin  chewable 81 milliGRAM(s) Oral daily  bacitracin/polymyxin B Ophthalmic Ointment 1 Application(s) Left EYE three times a day  budesonide  80 MICROgram(s)/formoterol 4.5 MICROgram(s) Inhaler 2 Puff(s) Inhalation daily  dextrose 5%. 1000 milliLiter(s) (50 mL/Hr) IV Continuous <Continuous>  dextrose 5%. 1000 milliLiter(s) (100 mL/Hr) IV Continuous <Continuous>  dextrose 50% Injectable 25 Gram(s) IV Push once  dextrose 50% Injectable 25 Gram(s) IV Push once  dextrose 50% Injectable 12.5 Gram(s) IV Push once  glucagon  Injectable 1 milliGRAM(s) IntraMuscular once  hydrocortisone 2.5% Ointment 1 Application(s) Topical daily  insulin detemir injectable (LEVEMIR) 10 Unit(s) SubCutaneous before breakfast  insulin detemir injectable (LEVEMIR) 12 Unit(s) SubCutaneous with dinner  insulin lispro Injectable (ADMELOG) 3 Unit(s) SubCutaneous before breakfast  insulin lispro Injectable (ADMELOG) 3 Unit(s) SubCutaneous before dinner  insulin lispro Injectable (ADMELOG) 3 Unit(s) SubCutaneous before lunch  montelukast 10 milliGRAM(s) Oral daily  prenatal multivitamin 1 Tablet(s) Oral daily  triamcinolone 0.1% Ointment 1 Application(s) Topical two times a day  valACYclovir 1000 milliGRAM(s) Oral daily    PAST MEDICAL & SURGICAL HISTORY:  Asthma  Diabetes mellitus, type 2  Eczema  PCOS (polycystic ovarian syndrome)  No significant past surgical history    Physical Exam:   Vital Signs Last 24 Hrs  T(C): 36.6 (03 Mar 2024 23:37), Max: 36.6 (03 Mar 2024 23:37)  T(F): 97.8 (03 Mar 2024 23:37), Max: 97.8 (03 Mar 2024 23:37)  HR: 84 (03 Mar 2024 23:37) (84 - 88)  BP: 103/57 (03 Mar 2024 23:37) (92/55 - 103/57)  RR: 18 (03 Mar 2024 23:37) (18 - 18)    Gen: AOx3, NAD   Heart: stye noted on left eye, with abscess noted  Cardio: RRR, S1S2, no m/r/g  Pulm: CTA b/l  Abdomen: soft, gravid, nontender, no palpable contractions   Extremities: no swelling or erythema noted     Labs:                        11.8   8.69  )-----------( 238      ( 01 Mar 2024 18:56 )             37.3

## 2024-03-04 NOTE — DISCHARGE NOTE NURSING/CASE MANAGEMENT/SOCIAL WORK - NSDCPEFALRISK_GEN_ALL_CORE
For information on Fall & Injury Prevention, visit: [email or web address] OR  [email or web address] OR  https://www.cdc.gov/steadi/patient.html

## 2024-03-04 NOTE — DISCHARGE NOTE PROVIDER - CARE PROVIDER_API CALL
Donis Richey  Obstetrics and Gynecology  [address] [place] [address]  Phone: [phone]  Fax: [phone]  Follow Up Time:

## 2024-03-04 NOTE — PROGRESS NOTE ADULT - ASSESSMENT
A/P: Ms. Negro is a 32yo , @16w5d, with PMHx T2DM, asthma, and obesity, admitted for antepartum glycemic control.      # T2DM:   - Current Insulin: Detemir 10U before breakfast; 12Units before dinner. Small hs snack.; Lispro: 3 Units ac breakfast; 3 Units ac Lunch; 3 Units ac supper. (ordered)   - FS fasting, preprandial, and 2h PP   - s/p 24h UTP   - c/w Aspirin 81 mg PO daily.  - CC diet with evening snack     # Asthma   - c/w Symbicort, Singulair, and Albuterol     # Left eyelid stye   - Opthalmology consult placed - appreciate recs  - Cortisporin eye ointment ordered q8h     # Eczema   - Appreciate Dermatology input       # Pregnancy   - Prenatal vitamins  - Ambulate as tolerated  - Vitals per routine

## 2024-03-04 NOTE — DISCHARGE NOTE PROVIDER - NSDCFUADDAPPT_GEN_ALL_CORE_FT
Eduardo Shay OD (optometrist) [phone]  [address] [place] [address], [facility] 2 [facility] [address], [place] [address]

## 2024-03-04 NOTE — PROGRESS NOTE ADULT - ATTENDING COMMENTS
[facility] Staff    I saw and evaluated Ms. TAYLOR NEGRO  with Dr. Snowden.      General:  Ms. TAYLOR NEGRO is lying in bed.  She appears comfortable.    Vital Signs Last 24 Hrs  T(C): 36.6 (04 Mar 2024 07:50), Max: 36.6 (03 Mar 2024 23:37)  T(F): 97.9 (04 Mar 2024 07:50), Max: 97.9 (04 Mar 2024 07:50)  HR: 84 (04 Mar 2024 07:50) (84 - 88)  BP: 106/50 (04 Mar 2024 07:50) (92/55 - 106/50)  BP(mean): --  RR: 18 (04 Mar 2024 07:50) (18 - 18)  SpO2: --    Abdomen:  Soft, nontender, nondistended, no rebound, no guarding.  Extremities:  Nontender calves.                          12.0   8.58  )-----------( 227      ( 04 Mar 2024 06:05 )             36.5                         11.8   8.69  )-----------( 238      ( 01 Mar 2024 18:56 )             37.3       03-04    138  |  104  |  11  ----------------------------<  90  4.0   |  22  |  0.5    Ca    8.9      04 Mar 2024 06:05   Mg    1.9  04 Mar 2024 06:05  Phos    4.2   04 Mar 2024 06:05    TPro  5.8  /  Alb  3.6  /  TBili  <0.2  /  DBili  x   /  AST  13  /  ALT  23  /  AlkPhos  56  24      Lactate dehydrogenase  x     24  Uric acid    x    24    CAPILLARY BLOOD GLUCOSE      POCT Blood Glucose.: 142 mg/dL (04 Mar 2024 09:35) - postprandial  POCT Blood Glucose.: 131 mg/dL (03 Mar 2024 20:49)  POCT Blood Glucose.: 143 mg/dL (03 Mar 2024 14:10)  POCT Blood Glucose.: 91 mg/dL (03 Mar 2024 11:15)    Urinalysis Basic - ( 04 Mar 2024 06:05 )    Color: x / Appearance: x / SG: x / pH: x  Gluc: 90 mg/dL / Ketone: x  / Bili: x / Urobili: x   Blood: x / Protein: x / Nitrite: x   Leuk Esterase: x / RBC: x / WBC x   Sq Epi: x / Non Sq Epi: x / Bacteria: x    Ms. Negro is a 34yo , @16w5d, with PMHx T2DM, asthma, and obesity with poor follow up, admitted for antepartum glycemic control.      # T2DM:   - Current Insulin: Detemir 10U before breakfast; 12Units before dinner. Small hs snack.; Lispro: 3 Units ac break; 3 Units ac Lunch; 3 Units ac supper.   - FS fasting, preprandial, and 2h PP   - Fingersticks are normal to mildly elevated, however it is not clear to me when the fingersticks are being checked.  A fasting fingerstick was not checked this morning.  That being said her fingersticks and insulin regimen can be monitored as an outpatient.  - Cardiology referral as an outpatient, once her is insurance reinstated next month.  - Dilated eye exam and foot exam.  - c/w Aspirin 81 mg PO daily.    # Asthma   - c/w Symbicort, Singulair, and Albuterol   - Check peak flow.    # Left eyelid stye   - Opthalmology consult placed   - Cortisporin eye ointment ordered q8h     # Eczema     -Hydrocortisone cream 2.5 % on face/ around nose qam     -Mometasone cream or ointment twice a day on the affected areas on  arms.     #Social  - Ms Negro stated that part of the reason she has not been following up is because her insurance lapsed.  It will be reinstated 2024.      # Pregnancy   - Prenatal vitamins  - Ambulate as tolerated  - Vitals per routine  - Check TSH and Free T4.    Once she is seen by Ophthalmology she can be discharged.  She has a follow up appointment with us 3/11/2024 and with her primary Obstetrician the same day.      Reza Sheppard MD

## 2024-03-04 NOTE — DISCHARGE NOTE NURSING/CASE MANAGEMENT/SOCIAL WORK - NSDCFUADDAPPT_GEN_ALL_CORE_FT
Eduardo Shay OD (optometrist) [phone]  [address] [place] [address], [facility] 2 [facility] 5, [place] [address]

## 2024-03-04 NOTE — DISCHARGE NOTE PROVIDER - NSDCFUSCHEDAPPT_GEN_ALL_CORE_FT
Mireille Patten  [facility] PreAdmits  Scheduled Appointment: 03/11/2024    Donis Richey  [facility] Physician [facility]  OBGYNGEN [address]  Scheduled Appointment: 03/11/2024    [facility] Physician [facility]  ANTEPARTUM [address]  Scheduled Appointment: 03/11/2024    Mireille Patten  [facility] PreAdmits  Scheduled Appointment: 04/01/2024    [facility] Physician [facility]  ANTEPARTUM [address]  Scheduled Appointment: 04/01/2024

## 2024-03-04 NOTE — DISCHARGE NOTE PROVIDER - HOSPITAL COURSE
24 @ 11:03    HPI:  32yo , @16w2d, FORREST 24 by first trimester sono, with h/o T2DM, asthma, obesity, presenting for  admission for glycemic control.  Pregnancy conceived with ovulation induction on clomid. Patient does not check finger sticks regularly, which she attributes to her busy schedule.  She states when she does check her FS, FFS will range from 100-120s, and 2hPP is frequently > 200.  She only takes her insulin sometimes, M changed insulin regime yesterday, currently Detemir 10U before breakfast; 12Units before dinner. Small hs snack.     discharged on same admission regimen. Follow up at [facility] next week

## 2024-03-04 NOTE — DISCHARGE NOTE PROVIDER - NSDCMRMEDTOKEN_GEN_ALL_CORE_FT
albuterol 90 mcg/inh inhalation aerosol: 2 puff(s) inhaled every 6 hours As needed Shortness of Breath and/or Wheezing  aspirin 81 mg oral tablet, chewable: 1 tab(s) orally once a day  hydrocortisone 2.5% topical ointment: 1 Apply topically to affected area once a day  insulin detemir 100 units/mL subcutaneous solution: 10 unit(s) subcutaneous once a day (before a meal)  insulin detemir 100 units/mL subcutaneous solution: 12 unit(s) subcutaneous once a day (before a meal)  insulin lispro 100 units/mL injectable solution: 3 unit(s) injectable 3 times a day with meals  triamcinolone 0.1% topical ointment: 1 Apply topically to affected area 2 times a day  valACYclovir 1 g oral tablet: 1 tab(s) orally once a day   100

## 2024-03-08 ENCOUNTER — NON-APPOINTMENT (OUTPATIENT)
Age: 34
End: 2024-03-08

## 2024-03-08 DIAGNOSIS — O99.712 DISEASES OF THE SKIN AND SUBCUTANEOUS TISSUE COMPLICATING PREGNANCY, SECOND TRIMESTER: ICD-10-CM

## 2024-03-08 DIAGNOSIS — O99.282 ENDOCRINE, NUTRITIONAL AND METABOLIC DISEASES COMPLICATING PREGNANCY, SECOND TRIMESTER: ICD-10-CM

## 2024-03-08 DIAGNOSIS — O98.512 OTHER VIRAL DISEASES COMPLICATING PREGNANCY, SECOND TRIMESTER: ICD-10-CM

## 2024-03-08 DIAGNOSIS — J45.909 UNSPECIFIED ASTHMA, UNCOMPLICATED: ICD-10-CM

## 2024-03-08 DIAGNOSIS — O26.892 OTHER SPECIFIED PREGNANCY RELATED CONDITIONS, SECOND TRIMESTER: ICD-10-CM

## 2024-03-08 DIAGNOSIS — B00.9 HERPESVIRAL INFECTION, UNSPECIFIED: ICD-10-CM

## 2024-03-08 DIAGNOSIS — O24.112 PRE-EXISTING TYPE 2 DIABETES MELLITUS, IN PREGNANCY, SECOND TRIMESTER: ICD-10-CM

## 2024-03-08 DIAGNOSIS — E28.2 POLYCYSTIC OVARIAN SYNDROME: ICD-10-CM

## 2024-03-08 DIAGNOSIS — H00.014 HORDEOLUM EXTERNUM LEFT UPPER EYELID: ICD-10-CM

## 2024-03-08 DIAGNOSIS — Z79.4 LONG TERM (CURRENT) USE OF INSULIN: ICD-10-CM

## 2024-03-08 DIAGNOSIS — L30.9 DERMATITIS, UNSPECIFIED: ICD-10-CM

## 2024-03-08 DIAGNOSIS — O99.891 OTHER SPECIFIED DISEASES AND CONDITIONS COMPLICATING PREGNANCY: ICD-10-CM

## 2024-03-08 DIAGNOSIS — Z3A.16 16 WEEKS GESTATION OF PREGNANCY: ICD-10-CM

## 2024-03-08 DIAGNOSIS — O99.512 DISEASES OF THE RESPIRATORY SYSTEM COMPLICATING PREGNANCY, SECOND TRIMESTER: ICD-10-CM

## 2024-03-10 ENCOUNTER — NON-APPOINTMENT (OUTPATIENT)
Age: 34
End: 2024-03-10

## 2024-03-11 ENCOUNTER — APPOINTMENT (OUTPATIENT)
Dept: OBGYN | Facility: CLINIC | Age: 34
End: 2024-03-11

## 2024-03-11 ENCOUNTER — APPOINTMENT (OUTPATIENT)
Dept: OBGYN | Facility: CLINIC | Age: 34
End: 2024-03-11
Payer: MEDICAID

## 2024-03-11 ENCOUNTER — RESULT CHARGE (OUTPATIENT)
Age: 34
End: 2024-03-11

## 2024-03-11 ENCOUNTER — OUTPATIENT (OUTPATIENT)
Dept: OUTPATIENT SERVICES | Facility: HOSPITAL | Age: 34
LOS: 1 days | End: 2024-03-11
Payer: MEDICAID

## 2024-03-11 ENCOUNTER — APPOINTMENT (OUTPATIENT)
Dept: ANTEPARTUM | Facility: CLINIC | Age: 34
End: 2024-03-11
Payer: MEDICAID

## 2024-03-11 ENCOUNTER — OUTPATIENT (OUTPATIENT)
Dept: OUTPATIENT SERVICES | Facility: HOSPITAL | Age: 34
LOS: 1 days | End: 2024-03-11

## 2024-03-11 VITALS — SYSTOLIC BLOOD PRESSURE: 112 MMHG | WEIGHT: 239 LBS | BODY MASS INDEX: 41.02 KG/M2 | DIASTOLIC BLOOD PRESSURE: 54 MMHG

## 2024-03-11 VITALS — HEART RATE: 88 BPM | OXYGEN SATURATION: 100 % | TEMPERATURE: 98.2 F

## 2024-03-11 DIAGNOSIS — Z34.90 ENCOUNTER FOR SUPERVISION OF NORMAL PREGNANCY, UNSPECIFIED, UNSPECIFIED TRIMESTER: ICD-10-CM

## 2024-03-11 DIAGNOSIS — O09.90 SUPERVISION OF HIGH RISK PREGNANCY, UNSPECIFIED, UNSPECIFIED TRIMESTER: ICD-10-CM

## 2024-03-11 LAB
BILIRUB UR QL STRIP: NEGATIVE
BP DIAS: 54 MM HG
BP SYS: 112 MM HG
CLARITY UR: CLEAR
COLLECTION METHOD: NORMAL
FETAL MOVEMENT: PRESENT
GLUCOSE BLDC GLUCOMTR-MCNC: 98
GLUCOSE UR-MCNC: NEGATIVE
HCG UR QL: NEGATIVE EU/DL
HGB UR QL STRIP.AUTO: NEGATIVE
KETONES UR-MCNC: NEGATIVE
LEUKOCYTE ESTERASE UR QL STRIP: NEGATIVE
NITRITE UR QL STRIP: NEGATIVE
OB COMMENTS: NORMAL
PH UR STRIP: 6
PROT UR STRIP-MCNC: NEGATIVE
SCHEDULED VISIT: YES
SP GR UR STRIP: 1.02
URINE ALBUMIN/PROTEIN: NORMAL
URINE GLUCOSE: NORMAL
URINE KETONES: NORMAL
WEEKS GESTATION: 17.5

## 2024-03-11 PROCEDURE — 99214 OFFICE O/P EST MOD 30 MIN: CPT

## 2024-03-11 PROCEDURE — 82962 GLUCOSE BLOOD TEST: CPT

## 2024-03-11 PROCEDURE — 99213 OFFICE O/P EST LOW 20 MIN: CPT

## 2024-03-11 PROCEDURE — 99214 OFFICE O/P EST MOD 30 MIN: CPT | Mod: 25

## 2024-03-11 PROCEDURE — ZZZZZ: CPT

## 2024-03-11 PROCEDURE — 82948 REAGENT STRIP/BLOOD GLUCOSE: CPT

## 2024-03-11 RX ORDER — METFORMIN HYDROCHLORIDE 500 MG/1
500 TABLET, COATED ORAL
Qty: 180 | Refills: 3 | Status: ACTIVE | COMMUNITY
Start: 2024-03-11 | End: 1900-01-01

## 2024-03-11 RX ORDER — ASPIRIN 81 MG/1
81 TABLET, CHEWABLE ORAL DAILY
Qty: 90 | Refills: 3 | Status: ACTIVE | COMMUNITY
Start: 2024-03-11 | End: 1900-01-01

## 2024-03-11 NOTE — DISCUSSION/SUMMARY
[FreeTextEntry1] : 3/11/24  [id number] at 17w5d, FORREST  by first trimester qing, presenting for consultation for T2DM. Currently managed by Dr. Richey. Pregnancy conceived with ovulation induction on clomid.  Patient did not bring blood sugar log. Patient reports fasting FS 120s and 2hr postprandial after lunch and dinner are in bola 160s. She usually skips breakfast. Reports she checks her fingersticks but does not always write them down due to her busy schedule. Patient was admitted to the hospital for glycemic control on 3/1 to 3/4.  She works at [employer] and works odd and long hours. Hours change every day. Yesterday she had steak, vegetables, a potato and cheesecake for dinner. Her FS was 167 postprandial. While admitted at the hospital, patient was started on maxitrol ointment treatment for a hordeolum on her left eye. Reports the ointment made it worse, so she discontinued it. Reports it has not increased in size since. Mildly tender to touch. Denies changes in vision. Has not scheduled follow up with opthamology yet.   From prior visit: PMHx: DM2: last A1c 9.1. Previously on Ozempic and Syngarty, switched to Glargine daily, Lispro tid. Follows with endocrinologist Dr. Robles. History of asthma. Denies hospitalizations or intubations. Currently on albuterol PRN, symbicort daily, and singular daily. Denies recent asthma exacerbations. Last asthma attack was one year ago when she had RSV. She was seen in the ER but not admitted to the hospital. She is vaccinated for covid x2 and flu.  Allergies: seasonal Meds:  albuterol PRN, symbicort daily, and singular daily. Lantus 22U AM and Humalog 2-5.  OB Hx:  MAB with D&C ectopic s/p MTX in   Gyn Hx: H/o HSV. Last outbreak in March. 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 Denies smoking, alcohol or drug use.  Patient discussed barriers to care including difficulty with her insurance and lack of social support. FOB is  to another women and continues that relationship. She states he promised her when the baby is born he will leave that relationship to be with her. Patient is adopted. Patients adopted mother passed aware 2-3 months ago from liver cancer and adoptive father  from diabetes when she was 13. Her biological mother struggles with drug addiction. She states she has 1 friend, Marilee who has offered to help her. Patient states her remaining family does not know about the pregnancy. She is concerned to discuss it with them as she knows the FOB is  to another women. She knows she will need to discuss the pregnancy with them. She currently lives alone with her dog and cat.  Psych: denies  Genetics: denies h/o genetic abnormalities  Physical Exam: /54: HR: 147 bpm, O2sat: 100 %, Wt: 239lbs<236<235 lbs FH 140bpm FS 98 (currently eating popcorn, did not eat breakfast) U dip: neg GA: AOx3, NAD. Acne rosacea on her face. 1cm hordeolum on left eye.  Heart: RRR, no M/R/G Lungs: CTAB Abd: soft, nontender, nondistended LE: no erythema, edema or pain. Atopic dermatitis on upper extremities.   Labs:  A1c 9.1, O neg, antibody neg, Hep B NR, Hep C NR, Rubella: equivocal, Frag X: neg, SMA: neg, TB Quant: Neg,CF neg, NIPTs LR 3/1 A1C 6.6%  3/2 TSH 1.87, T4 9, 24hr   OBUS: : S=D based on  sono : Normal NT screen 3/1: 16w2d, 186g anatomy wnl  A/P Ms Negro is a 34yo  at 17w5d GA FORREST 8/15/2024 with asthma and T2DM. Now with hordeulum on her left eye for the past month.  1. T2DM: Poor Control and non-compliant -Patient reporting fasting FS 120s, 2hr postprandial FS 160s. Did not bring FS log -Counseled patient on the importance of glucose control in pregnancy and importance of logging FS.  -Previously discussed the risk of end organ damage, including eyes, heart, kidneys in patients with diabetes, as well as the risk of coma and death in patients with uncontrolled diabetes. Counseled on risks to fetus of poorly controlled diabetes in pregnancy. -Current insulin regimen: Detemir 12U before breakfast, 12U before dinner, Lispro 3/3/3 -New insulin regimen (adjusted per patient's verbal FS values) Detemir 12U before breakfast, 14U before dinner, Lispro , metformin 500mg BID -Rx for metformin sent to pharmacy -Patient counseled on starting ASA 81mg daily to reduce risk of preeclampsia. Rx sent to pharmacy -S/p dietician counseling  - 3/1 24hr   Previously recommended: - Dilated eye exam and foot exam. Referral for opthamology and podiatry given - 3/1 EKG: NSR - Referral for cardiology given  - Script for fetal echocardiogram next visit  2. Obesity -Previously  discussed that obesity is associated with a host of pregnancy complications. -The patient is at increased risk for preeclampsia and gestational hypertension,  delivery, macrosomia, gestational diabetes, deep venous thrombosis,  delivery, stillbirth and certain birth defects, such as open neural tube defects. - Will need weekly biophysical profiles in third trimester.  - Cardiology consult pending  3. Asthma: Has not required albuterol recently.  -Well controlled with symbicort daily, singulair daily, albuterol prn -Continue with current regimen, follows closely with Dr. Nash off of [facility].  4. Ocular hordeulum, left - warm compresses - f/u w ophthalmology  5. Pregnancy -History of HSV, recommend Valtrex for suppression at 36 weeks.  -Recommend RhoGam at 24 weeks.  -Patient has script for MSAFP -Follow with Dr Richey for prenatal care  RTC 1 week for follow up with FS log.

## 2024-03-12 DIAGNOSIS — Z34.90 ENCOUNTER FOR SUPERVISION OF NORMAL PREGNANCY, UNSPECIFIED, UNSPECIFIED TRIMESTER: ICD-10-CM

## 2024-03-12 DIAGNOSIS — O99.519 DISEASES OF THE RESPIRATORY SYSTEM COMPLICATING PREGNANCY, UNSPECIFIED TRIMESTER: ICD-10-CM

## 2024-03-12 DIAGNOSIS — O09.519 SUPERVISION OF ELDERLY PRIMIGRAVIDA, UNSPECIFIED TRIMESTER: ICD-10-CM

## 2024-03-12 DIAGNOSIS — O24.112 PRE-EXISTING TYPE 2 DIABETES MELLITUS, IN PREGNANCY, SECOND TRIMESTER: ICD-10-CM

## 2024-03-12 DIAGNOSIS — O99.212 OBESITY COMPLICATING PREGNANCY, SECOND TRIMESTER: ICD-10-CM

## 2024-03-12 DIAGNOSIS — Z3A.17 17 WEEKS GESTATION OF PREGNANCY: ICD-10-CM

## 2024-03-15 ENCOUNTER — NON-APPOINTMENT (OUTPATIENT)
Age: 34
End: 2024-03-15

## 2024-03-18 ENCOUNTER — APPOINTMENT (OUTPATIENT)
Dept: ANTEPARTUM | Facility: CLINIC | Age: 34
End: 2024-03-18
Payer: MEDICAID

## 2024-03-18 ENCOUNTER — OUTPATIENT (OUTPATIENT)
Dept: OUTPATIENT SERVICES | Facility: HOSPITAL | Age: 34
LOS: 1 days | End: 2024-03-18
Payer: MEDICAID

## 2024-03-18 VITALS
SYSTOLIC BLOOD PRESSURE: 117 MMHG | BODY MASS INDEX: 40.85 KG/M2 | HEART RATE: 87 BPM | OXYGEN SATURATION: 97 % | DIASTOLIC BLOOD PRESSURE: 53 MMHG | TEMPERATURE: 98.2 F | WEIGHT: 238 LBS

## 2024-03-18 DIAGNOSIS — O09.90 SUPERVISION OF HIGH RISK PREGNANCY, UNSPECIFIED, UNSPECIFIED TRIMESTER: ICD-10-CM

## 2024-03-18 LAB
BILIRUB UR QL STRIP: NORMAL
BP DIAS: 53 MM HG
BP SYS: 117 MM HG
CLARITY UR: CLEAR
COLLECTION METHOD: NORMAL
FETAL HEART RATE (BPM): 154
GLUCOSE BLDC GLUCOMTR-MCNC: 89
GLUCOSE UR-MCNC: NORMAL
HCG UR QL: 0.2 EU/DL
HGB UR QL STRIP.AUTO: NORMAL
KETONES UR-MCNC: NORMAL
LEUKOCYTE ESTERASE UR QL STRIP: NORMAL
NITRITE UR QL STRIP: NORMAL
OB COMMENTS: NORMAL
PH UR STRIP: 5
PROT UR STRIP-MCNC: NORMAL
SCHEDULED VISIT: YES
SP GR UR STRIP: 1.02
URINE ALBUMIN/PROTEIN: NORMAL
URINE GLUCOSE: NORMAL
URINE KETONES: NORMAL
WEEKS GESTATION: 18.5

## 2024-03-18 PROCEDURE — 99213 OFFICE O/P EST LOW 20 MIN: CPT

## 2024-03-18 PROCEDURE — 82962 GLUCOSE BLOOD TEST: CPT

## 2024-03-18 PROCEDURE — 82948 REAGENT STRIP/BLOOD GLUCOSE: CPT

## 2024-03-18 PROCEDURE — 99213 OFFICE O/P EST LOW 20 MIN: CPT | Mod: 25

## 2024-03-18 PROCEDURE — 81002 URINALYSIS NONAUTO W/O SCOPE: CPT

## 2024-03-18 NOTE — DISCUSSION/SUMMARY
[FreeTextEntry1] : 3/18/24 MFM follow up 33y  at 18w5d, FORREST  by first trimester sonogam, presenting for follow up consultation for T2DM. Currently managed by Dr. Richey. Pregnancy conceived with ovulation induction on clomid.  Patient brought blood sugar log and glucometer today. FS randomly tested throughout the day. Patient reports she does not eat breakfast. Patient reports she picked up her metformin that was prescribed last visit, but has not started taking it. Has not started taking aspirin yet though she has it. Reports she threw out all her referals given last visit when cleaning her car. On 3/15 patient had an episode of lower abdominal cramping, followed by dysuria and hematuria that resolved after her void. Denies vaginal bleeding or any urinary symptoms since. Patient had intercourse the night prior. Reports the hordeolum on her right eye is stable in size and she has been using warm compresses. Denies changes in vision. Has not scheduled follow up with opthamology yet.  From prior visit: PMHx: DM2: last A1c 9.1. Previously on Ozempic and Syngarty, switched to Glargine daily, Lispro tid. Follows with endocrinologist Dr. Robles. History of asthma. Denies hospitalizations or intubations. Currently on albuterol PRN, symbicort daily, and singular daily. Denies recent asthma exacerbations. Last asthma attack was one year ago when she had RSV. She was seen in the ER but not admitted to the hospital. She is vaccinated for covid x2 and flu.  Allergies: seasonal Meds: albuterol PRN, symbicort daily, and singular daily. Lantus 22U AM and Humalog 2-5.  OB Hx:  MAB with D&C ectopic s/p MTX in  Gyn Hx: H/o HSV. Last outbreak in March. 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 Patient discussed barriers to care including difficulty with her insurance and lack of social support. SEBLE is  to another women and continues that relationship. She states he promised her when the baby is born he will leave that relationship to be with her. Patient is adopted. Patients adopted mother passed aware 2-3 months ago from liver cancer and adoptive father  from diabetes when she was 13. Her biological mother struggles with drug addiction. She states she has 1 friend, Marilee who has offered to help her. Patient states her remaining family does not know about the pregnancy. She is concerned to discuss it with them as she knows the FOB is  to another women. She knows she will need to discuss the pregnancy with them. She currently lives alone with her dog and cat.  Psych: denies Genetics: denies h/o genetic abnormalities  Physical Exam: /53 HR: 87 bpm, O2sat: 97 %, Wt: 238lbs<239lbs<236<235 lbs FH 140bpm FS 98  U dip: large ketones GA: AOx3, NAD. Acne rosacea on her face. 1cm hordeolum on left eye. Heart: RRR, no M/R/G Lungs: CTAB Abd: soft, nontender, nondistended LE: no erythema, edema or pain. Atopic dermatitis on upper extremities.  Labs:  A1c 9.1, O neg, antibody neg, Hep B NR, Hep C NR, Rubella: equivocal, Frag X: neg, SMA: neg, TB Quant: Neg,CF neg, NIPTs LR 3/1 A1C 6.6% 3/2 TSH 1.87, T4 9, 24hr   OBUS: : S=D based on  sono : Normal NT screen 3/1: 16w2d, 186g anatomy wnl  A/P Ms Negro is a 34yo  at 18w5d GA FORREST 8/15/2024 with asthma and T2DM, with persistant hordeulum on her left eye for the past month. 1. T2DM: Poor Control and non-compliant -FS log reviewed 3/15-3/17. Fasting FS (taken 12-3pm) 123-140 (3/3 elevated). Pre dinner , 120, 180. 2hr post dinner 162. -Rediscussed with patient how to check FS 4 times a day and what the parameters are. -Current insulin regimen: Detemir 12U before breakfast, 14U before dinner, Lispro 5/5/5, metformin 500mg BID (not taking metformin) -Due to elevated fasting FS, will increase bedtime levemir -New insulin regimen:  Detemir 12U before breakfast, 16U before dinner, Lispro , metformin 500mg BID  -Counseled patient on the importance of glucose control in pregnancy and importance of logging FS. -Previously discussed the risk of end organ damage, including eyes, heart, kidneys in patients with diabetes, as well as the risk of coma and death in patients with uncontrolled diabetes. Counseled on risks to fetus of poorly controlled diabetes in pregnancy. -Rx for metformin previously sent to pharmacy -Patient counseled on starting ASA 81mg daily to reduce risk of preeclampsia. Rx sent to pharmacy -S/p dietician counseling -3/1 24hr   Previously recommended: - Dilated eye exam and foot exam. Referral for opthamology and podiatry given - 3/1 EKG: NSR - Referral for cardiology given - Script for fetal echocardiogram next visit  2. Obesity -Previously discussed that obesity is associated with a host of pregnancy complications. -The patient is at increased risk for preeclampsia and gestational hypertension,  delivery, macrosomia, gestational diabetes, deep venous thrombosis,  delivery, stillbirth and certain birth defects, such as open neural tube defects. - Will need weekly biophysical profiles in third trimester. - Cardiology consult pending  3. Asthma: Has not required albuterol recently. -Well controlled with symbicort daily, singulair daily, albuterol prn -Continue with current regimen, follows closely with Dr. Nash off of [facility].  4. Ocular hordeulum, left - warm compresses - f/u w ophthalmology  5. Vaginal bleeding/hematuria, now resolved - Script for UCx given - Rh neg, s/p Rhogam  for first trimester bleeding. Recommend Rhogam q12 weeks for duration of pregnancy.   6. Pregnancy -History of HSV, recommend Valtrex for suppression at 36 weeks. -Reprinted script for MSAFP, encouraged patient to get bloodwork done -Follow with Dr Richey for prenatal care  RTC 1 week for follow up with FS log.

## 2024-03-19 DIAGNOSIS — O09.519 SUPERVISION OF ELDERLY PRIMIGRAVIDA, UNSPECIFIED TRIMESTER: ICD-10-CM

## 2024-03-19 DIAGNOSIS — Z3A.18 18 WEEKS GESTATION OF PREGNANCY: ICD-10-CM

## 2024-03-19 DIAGNOSIS — O24.112 PRE-EXISTING TYPE 2 DIABETES MELLITUS, IN PREGNANCY, SECOND TRIMESTER: ICD-10-CM

## 2024-03-19 DIAGNOSIS — O99.519 DISEASES OF THE RESPIRATORY SYSTEM COMPLICATING PREGNANCY, UNSPECIFIED TRIMESTER: ICD-10-CM

## 2024-03-19 DIAGNOSIS — O99.212 OBESITY COMPLICATING PREGNANCY, SECOND TRIMESTER: ICD-10-CM

## 2024-03-25 ENCOUNTER — RESULT CHARGE (OUTPATIENT)
Age: 34
End: 2024-03-25

## 2024-03-25 ENCOUNTER — OUTPATIENT (OUTPATIENT)
Dept: OUTPATIENT SERVICES | Facility: HOSPITAL | Age: 34
LOS: 1 days | End: 2024-03-25
Payer: MEDICAID

## 2024-03-25 ENCOUNTER — APPOINTMENT (OUTPATIENT)
Dept: ANTEPARTUM | Facility: CLINIC | Age: 34
End: 2024-03-25
Payer: MEDICAID

## 2024-03-25 VITALS
DIASTOLIC BLOOD PRESSURE: 58 MMHG | BODY MASS INDEX: 41.02 KG/M2 | WEIGHT: 239 LBS | OXYGEN SATURATION: 100 % | SYSTOLIC BLOOD PRESSURE: 126 MMHG | HEART RATE: 92 BPM

## 2024-03-25 DIAGNOSIS — O09.90 SUPERVISION OF HIGH RISK PREGNANCY, UNSPECIFIED, UNSPECIFIED TRIMESTER: ICD-10-CM

## 2024-03-25 LAB
BILIRUB UR QL STRIP: NORMAL
BP DIAS: 58 MM HG
BP SYS: 126 MM HG
CLARITY UR: CLEAR
COLLECTION METHOD: NORMAL
FETAL HEART RATE (BPM): 145
FETAL MOVEMENT: PRESENT
GLUCOSE BLDC GLUCOMTR-MCNC: 105
GLUCOSE UR-MCNC: NORMAL
HCG UR QL: 0.2 EU/DL
HGB UR QL STRIP.AUTO: NORMAL
KETONES UR-MCNC: NORMAL
LEUKOCYTE ESTERASE UR QL STRIP: NORMAL
NITRITE UR QL STRIP: NORMAL
OB COMMENTS: NORMAL
PH UR STRIP: 5
PROT UR STRIP-MCNC: NORMAL
SCHEDULED VISIT: YES
SP GR UR STRIP: 1.03
URINE ALBUMIN/PROTEIN: NORMAL
URINE GLUCOSE: NORMAL
URINE KETONES: NORMAL
WEEKS GESTATION: 19.5

## 2024-03-25 PROCEDURE — 82962 GLUCOSE BLOOD TEST: CPT

## 2024-03-25 PROCEDURE — 99214 OFFICE O/P EST MOD 30 MIN: CPT | Mod: 25

## 2024-03-25 PROCEDURE — 81002 URINALYSIS NONAUTO W/O SCOPE: CPT

## 2024-03-25 PROCEDURE — 82948 REAGENT STRIP/BLOOD GLUCOSE: CPT

## 2024-03-25 NOTE — DISCUSSION/SUMMARY
[FreeTextEntry1] : 3/18/24 MFM follow up 33y  at 19w5d, FORREST  by first trimester sonogam, presenting for follow up consultation for T2DM. Currently managed by Dr. Richey. Pregnancy conceived with ovulation induction on clomid.  Patient did not bring FS log today but FS values reviewed on glucometer. Patient taking FS sporadically throughout the day though she reports she usually eats three meals daily, sometimes skips breakfast. Now taking metformin and ASA as prescribed. Has not done MSAFP or urine culture as instructed last time. Reports she will go this week. Denies any more episodes of hematuria. Denies urinary symptoms. Has appointment scheduled with opthamology in April. Reports the hordeolum on the right eye is stable and she continues to use warm compresses prn.   From prior visit: PMHx: DM2: last A1c 9.1. Previously on Ozempic and Syngarty, switched to Glargine daily, Lispro tid. Follows with endocrinologist Dr. Robles. History of asthma. Denies hospitalizations or intubations. Currently on albuterol PRN, symbicort daily, and singular daily. Denies recent asthma exacerbations. Last asthma attack was one year ago when she had RSV. She was seen in the ER but not admitted to the hospital. She is vaccinated for covid x2 and flu.  Allergies: seasonal Meds: albuterol PRN, symbicort daily, and singular daily. Lantus 22U AM and Humalog 2-5.  OB Hx:  MAB with D&C ectopic s/p MTX in  Gyn Hx: H/o HSV. Last outbreak in March. 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 Patient discussed barriers to care including difficulty with her insurance and lack of social support. SEBLE is  to another women and continues that relationship. She states he promised her when the baby is born he will leave that relationship to be with her. Patient is adopted. Patients adopted mother passed aware 2-3 months ago from liver cancer and adoptive father  from diabetes when she was 13. Her biological mother struggles with drug addiction. She states she has 1 friend, Marilee who has offered to help her. Patient states her remaining family does not know about the pregnancy. She is concerned to discuss it with them as she knows the FOB is  to another women. She knows she will need to discuss the pregnancy with them. She currently lives alone with her dog and cat.  Psych: denies Genetics: denies h/o genetic abnormalities  Physical Exam: /58 HR: 92 bpm, O2sat: 100 %, Wt: 239<238lbs<239lbs<236<235 lbs FH 145bpm  (fasting) U dip: large ketones GA: AOx3, NAD. Acne rosacea on her face. 1cm hordeolum on left eye. Heart: RRR, no M/R/G Lungs: CTAB Abd: soft, nontender, nondistended LE: no erythema, edema or pain. Atopic dermatitis on upper extremities.  Labs:  A1c 9.1, O neg, antibody neg, Hep B NR, Hep C NR, Rubella: equivocal, Frag X: neg, SMA: neg, TB Quant: Neg,CF neg, NIPTs LR 3/1 A1C 6.6% 3/2 TSH 1.87, T4 9, 24hr   OBUS: : S=D based on  sono : Normal NT screen 3/1: 16w2d, 186g anatomy wnl  A/P Ms Negro is a 34yo  at 19w5d GA FORREST 8/15/2024 with asthma and T2DM, with persistent hordeulum on her left eye for the past month. 1. T2DM: Poor Control and non-compliant -Glucometer reviewed 3/19-3/24. Fasting FS 86 (1 value recorded. 2hr post breakfast  (1 value recorder), 2hr post lunch , 111 (2 values recorded), 2hr post dinner , 191 (2 values recorded). Patient reports overall increase in all her fingerstick values. -Rediscussed with patient how to check FS 4 times a day and what the parameters are. If she can not/will not check 4 times per day she should check twice a day and alternate the times when she checks.  -Current insulin regimen: Detemir 12U before breakfast, 16U before dinner, Lispro , metformin 500mg BID -Due to elevated values, will increase lispro with all her meals and increase PM levemir.  -New insulin regimen: Detemir 12U before breakfast, 18U before dinner, Lispro , metformin 500mg BID -Counseled patient on the importance of glucose control in pregnancy and importance of logging FS. -Previously discussed the risk of end organ damage, including eyes, heart, kidneys in patients with diabetes, as well as the risk of coma and death in patients with uncontrolled diabetes. Counseled on risks to fetus of poorly controlled diabetes in pregnancy. -C/w ASA 81 mg PO daily -S/p dietician counseling -3/1 24hr  mg/24 hours  Previously recommended: - Dilated eye exam and foot exam. Referral for ophthalmology and podiatry given - 3/1 EKG: NSR - Referral for cardiology given, encouraged to make appointment  - Script for fetal echo given  2. Obesity -Previously discussed that obesity is associated with a host of pregnancy complications. -The patient is at increased risk for preeclampsia and gestational hypertension,  delivery, macrosomia, gestational diabetes, deep venous thrombosis,  delivery, stillbirth and certain birth defects, such as open neural tube defects. - Will need weekly biophysical profiles in third trimester. - Cardiology consult pending  3. Asthma: Has not required albuterol recently. -Well controlled with symbicort daily, singulair daily, albuterol prn -Continue with current regimen, follows closely with Dr. Nash off of [facility].  4. Ocular hordeulum, left - She had a Ophthalmology consult while in house. - warm compresses - f/u w ophthalmology on 4/15  5. Vaginal bleeding/hematuria, now resolved - Script for UCx previously given. Patient reports she will go this week. - Rh neg, s/p Rhogam  for first trimester bleeding  6. Pregnancy -History of HSV, recommend Valtrex for suppression at 36 weeks. -Patient reports she will do MSAFP this week. -Follow with Dr Richey for prenatal care  RTC 1 week for follow up with FS log and for anatomy ultrasound.

## 2024-03-26 DIAGNOSIS — Z3A.19 19 WEEKS GESTATION OF PREGNANCY: ICD-10-CM

## 2024-03-26 DIAGNOSIS — O24.112 PRE-EXISTING TYPE 2 DIABETES MELLITUS, IN PREGNANCY, SECOND TRIMESTER: ICD-10-CM

## 2024-03-26 DIAGNOSIS — O99.212 OBESITY COMPLICATING PREGNANCY, SECOND TRIMESTER: ICD-10-CM

## 2024-03-26 DIAGNOSIS — O99.519 DISEASES OF THE RESPIRATORY SYSTEM COMPLICATING PREGNANCY, UNSPECIFIED TRIMESTER: ICD-10-CM

## 2024-03-26 DIAGNOSIS — O09.519 SUPERVISION OF ELDERLY PRIMIGRAVIDA, UNSPECIFIED TRIMESTER: ICD-10-CM

## 2024-03-28 ENCOUNTER — OUTPATIENT (OUTPATIENT)
Dept: OUTPATIENT SERVICES | Facility: HOSPITAL | Age: 34
LOS: 1 days | End: 2024-03-28
Payer: MEDICAID

## 2024-03-28 DIAGNOSIS — Z34.90 ENCOUNTER FOR SUPERVISION OF NORMAL PREGNANCY, UNSPECIFIED, UNSPECIFIED TRIMESTER: ICD-10-CM

## 2024-03-28 PROCEDURE — 87086 URINE CULTURE/COLONY COUNT: CPT

## 2024-03-28 PROCEDURE — 82105 ALPHA-FETOPROTEIN SERUM: CPT

## 2024-03-29 ENCOUNTER — NON-APPOINTMENT (OUTPATIENT)
Age: 34
End: 2024-03-29

## 2024-03-29 DIAGNOSIS — Z34.90 ENCOUNTER FOR SUPERVISION OF NORMAL PREGNANCY, UNSPECIFIED, UNSPECIFIED TRIMESTER: ICD-10-CM

## 2024-04-01 ENCOUNTER — APPOINTMENT (OUTPATIENT)
Dept: ANTEPARTUM | Facility: CLINIC | Age: 34
End: 2024-04-01
Payer: MEDICAID

## 2024-04-01 ENCOUNTER — OUTPATIENT (OUTPATIENT)
Dept: OUTPATIENT SERVICES | Facility: HOSPITAL | Age: 34
LOS: 1 days | End: 2024-04-01
Payer: MEDICAID

## 2024-04-01 ENCOUNTER — ASOB RESULT (OUTPATIENT)
Age: 34
End: 2024-04-01

## 2024-04-01 VITALS
OXYGEN SATURATION: 98 % | HEART RATE: 82 BPM | BODY MASS INDEX: 41.54 KG/M2 | DIASTOLIC BLOOD PRESSURE: 53 MMHG | WEIGHT: 242 LBS | SYSTOLIC BLOOD PRESSURE: 108 MMHG

## 2024-04-01 DIAGNOSIS — Z34.90 ENCOUNTER FOR SUPERVISION OF NORMAL PREGNANCY, UNSPECIFIED, UNSPECIFIED TRIMESTER: ICD-10-CM

## 2024-04-01 DIAGNOSIS — O09.90 SUPERVISION OF HIGH RISK PREGNANCY, UNSPECIFIED, UNSPECIFIED TRIMESTER: ICD-10-CM

## 2024-04-01 PROCEDURE — 76817 TRANSVAGINAL US OBSTETRIC: CPT | Mod: 26

## 2024-04-01 PROCEDURE — 99214 OFFICE O/P EST MOD 30 MIN: CPT | Mod: 25

## 2024-04-01 PROCEDURE — 81002 URINALYSIS NONAUTO W/O SCOPE: CPT

## 2024-04-01 PROCEDURE — 82948 REAGENT STRIP/BLOOD GLUCOSE: CPT

## 2024-04-01 PROCEDURE — 76811 OB US DETAILED SNGL FETUS: CPT | Mod: 26

## 2024-04-01 PROCEDURE — 82962 GLUCOSE BLOOD TEST: CPT

## 2024-04-01 PROCEDURE — 76811 OB US DETAILED SNGL FETUS: CPT

## 2024-04-01 PROCEDURE — 76817 TRANSVAGINAL US OBSTETRIC: CPT

## 2024-04-01 RX ORDER — VALACYCLOVIR 1 G/1
1 TABLET, FILM COATED ORAL DAILY
Qty: 90 | Refills: 3 | Status: ACTIVE | COMMUNITY
Start: 2023-02-09 | End: 1900-01-01

## 2024-04-01 NOTE — DISCUSSION/SUMMARY
[FreeTextEntry1] : 33y  at 20w5d, FORREST  by first trimester sonogam, presenting for follow up consultation for T2DM. Currently managed by Dr. Richey. Pregnancy conceived with ovulation induction on clomid.  Patient brought FS log today. Has not been eating breakfast. Complaint with insulin and metformin. Patient denies episodes of hypoglycemia. Taking ASA daily. Denies contractions, leakage of fluid, vaginal bleeding. Has scheduled appointment with opthamology on 4/15 for her hordeolum. Reports it started spotaneously draining this weekend. Patient also expresses concern about her rosacea. Though it has not worsened, she is embarassed by it and looking to try different facial products. Desires to see dermatology outpatient.   From prior visit: PMHx: DM2: last A1c 9.1. Previously on Ozempic and Syngarty, switched to Glargine daily, Lispro tid. Follows with endocrinologist Dr. Robles. History of asthma. Denies hospitalizations or intubations. Currently on albuterol PRN, symbicort daily, and singular daily. Denies recent asthma exacerbations. Last asthma attack was one year ago when she had RSV. She was seen in the ER but not admitted to the hospital. She is vaccinated for covid x2 and flu.  Allergies: seasonal Meds: albuterol PRN, symbicort daily, and singular daily. Lantus 22U AM and Humalog 2-5.  OB Hx:  MAB with D&C ectopic s/p MTX in  Gyn Hx: H/o HSV. Last outbreak in March. 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 Patient discussed barriers to care including difficulty with her insurance and lack of social support. SEBLE is  to another women and continues that relationship. She states he promised her when the baby is born he will leave that relationship to be with her. Patient is adopted. Patients adopted mother passed aware 2-3 months ago from liver cancer and adoptive father  from diabetes when she was 13. Her biological mother struggles with drug addiction. She states she has 1 friend, Marilee who has offered to help her. Patient states her remaining family does not know about the pregnancy. She is concerned to discuss it with them as she knows the FOB is  to another women. She knows she will need to discuss the pregnancy with them. She currently lives alone with her dog and cat.  Psych: denies Genetics: denies h/o genetic abnormalities  Physical Exam: /53 HR: 82 bpm, O2sat: 98 %, Wt: 242<239<238lbs<239lbs<236<235 lbs FH 147bpm FS 96 (fasting) U dip: trace protein GA: AOx3, NAD. Acne rosacea on her face. 1cm hordeolum on left eye. Heart: RRR, no M/R/G Lungs: CTAB Abd: soft, nontender, nondistended LE: no erythema, edema or pain. Atopic dermatitis on upper extremities.  Labs:  A1c 9.1, O neg, antibody neg, Hep B NR, Hep C NR, Rubella: equivocal, Frag X: neg, SMA: neg, TB Quant: Neg,CF neg, NIPTs LR 3/1 A1C 6.6% 3/2 TSH 1.87, T4 9, 24hr   OBUS: : S=D based on  sono : Normal NT screen 3/1: 16w2d, 186g anatomy wnl : 20w5d, LGA 98% (AC 94%) anatomy wnl but incomplete eval of heart  A/P Ms Negro is a 32yo  at 20w5d GA FORREST 8/15/2024 with asthma and T2DM, with persistent hordeulum on her left eye for the past month. 1. T2DM: Poor Control  -FS log reviewed 3/26-3/31. Fasting -109 (6/6 elevated). 2hr PP -174 (10/10 elevated).  -Current insulin regimen: Detemir 12U before breakfast, 18U before dinner, Lispro , metformin 500mg BID -Due to all elevated values, will increase all insulin. -New insulin regimen: Detemir 16U before breakfast, 20U before dinner, Lispro 10/10/10, metformin 500mg BID -Previously discussed the risk of end organ damage, including eyes, heart, kidneys in patients with diabetes, as well as the risk of coma and death in patients with uncontrolled diabetes. Counseled on risks to fetus of poorly controlled diabetes in pregnancy. -C/w ASA 81 mg PO daily -S/p dietician counseling -3/1 24hr  mg/24 hours  Previously recommended: - Dilated eye exam and foot exam. Opthamology appointment scheduled 4/15. Referral for podiatry given previously given.  - 3/1 EKG: NSR - Referral previously for cardiology given, encouraged to make appointment - Fetal echo scheduled   2. Obesity -Previously discussed that obesity is associated with a host of pregnancy complications. -The patient is at increased risk for preeclampsia and gestational hypertension,  delivery, macrosomia, gestational diabetes, deep venous thrombosis,  delivery, stillbirth and certain birth defects, such as open neural tube defects. - Will need weekly biophysical profiles in third trimester. - Cardiology consult pending  3. Asthma: Has not required albuterol recently. -Well controlled with symbicort daily, singulair daily, albuterol prn -Continue with current regimen, follows closely with Dr. Nash off of [facility].  4. Ocular hordeulum, left - She had a ophthalmology consult during inpatient admission - warm compresses - f/u w ophthalmology on 4/15  5. Rosacea -Recommended gentle face cleanser, azelaic acid, and sunscreen -Dermatology referral given   6. First trimester vaginal bleeding, now resolved - Rh neg, s/p Rhogam   - Recommend rhogam in 12 weeks  7. Pregnancy -History of HSV, taking valtrex 1g daily for suppression. Patient would like to continue for remainer of pregnancy. Refill sent today -MSAFP pending, patient encouraged to go -Follow with Dr Richey for prenatal care  Follow up in 1 week for BG check.

## 2024-04-02 DIAGNOSIS — O36.60X0 MATERNAL CARE FOR EXCESSIVE FETAL GROWTH, UNSPECIFIED TRIMESTER, NOT APPLICABLE OR UNSPECIFIED: ICD-10-CM

## 2024-04-02 DIAGNOSIS — O09.512 SUPERVISION OF ELDERLY PRIMIGRAVIDA, SECOND TRIMESTER: ICD-10-CM

## 2024-04-02 DIAGNOSIS — O99.212 OBESITY COMPLICATING PREGNANCY, SECOND TRIMESTER: ICD-10-CM

## 2024-04-02 DIAGNOSIS — O99.519 DISEASES OF THE RESPIRATORY SYSTEM COMPLICATING PREGNANCY, UNSPECIFIED TRIMESTER: ICD-10-CM

## 2024-04-02 DIAGNOSIS — Z3A.20 20 WEEKS GESTATION OF PREGNANCY: ICD-10-CM

## 2024-04-02 DIAGNOSIS — O98.512 OTHER VIRAL DISEASES COMPLICATING PREGNANCY, SECOND TRIMESTER: ICD-10-CM

## 2024-04-02 DIAGNOSIS — O36.62X0 MATERNAL CARE FOR EXCESSIVE FETAL GROWTH, SECOND TRIMESTER, NOT APPLICABLE OR UNSPECIFIED: ICD-10-CM

## 2024-04-02 DIAGNOSIS — O24.112 PRE-EXISTING TYPE 2 DIABETES MELLITUS, IN PREGNANCY, SECOND TRIMESTER: ICD-10-CM

## 2024-04-03 DIAGNOSIS — O36.60X0 MATERNAL CARE FOR EXCESSIVE FETAL GROWTH, UNSPECIFIED TRIMESTER, NOT APPLICABLE OR UNSPECIFIED: ICD-10-CM

## 2024-04-03 DIAGNOSIS — O24.112 PRE-EXISTING TYPE 2 DIABETES MELLITUS, IN PREGNANCY, SECOND TRIMESTER: ICD-10-CM

## 2024-04-03 DIAGNOSIS — O99.212 OBESITY COMPLICATING PREGNANCY, SECOND TRIMESTER: ICD-10-CM

## 2024-04-03 DIAGNOSIS — O99.519 DISEASES OF THE RESPIRATORY SYSTEM COMPLICATING PREGNANCY, UNSPECIFIED TRIMESTER: ICD-10-CM

## 2024-04-03 DIAGNOSIS — Z3A.20 20 WEEKS GESTATION OF PREGNANCY: ICD-10-CM

## 2024-04-03 DIAGNOSIS — O09.519 SUPERVISION OF ELDERLY PRIMIGRAVIDA, UNSPECIFIED TRIMESTER: ICD-10-CM

## 2024-04-04 ENCOUNTER — NON-APPOINTMENT (OUTPATIENT)
Age: 34
End: 2024-04-04

## 2024-04-08 ENCOUNTER — APPOINTMENT (OUTPATIENT)
Dept: OBGYN | Facility: CLINIC | Age: 34
End: 2024-04-08

## 2024-04-09 ENCOUNTER — NON-APPOINTMENT (OUTPATIENT)
Age: 34
End: 2024-04-09

## 2024-04-15 ENCOUNTER — APPOINTMENT (OUTPATIENT)
Dept: OPHTHALMOLOGY | Facility: CLINIC | Age: 34
End: 2024-04-15
Payer: MEDICAID

## 2024-04-15 ENCOUNTER — APPOINTMENT (OUTPATIENT)
Dept: OBGYN | Facility: CLINIC | Age: 34
End: 2024-04-15
Payer: MEDICAID

## 2024-04-15 ENCOUNTER — OUTPATIENT (OUTPATIENT)
Dept: OUTPATIENT SERVICES | Facility: HOSPITAL | Age: 34
LOS: 1 days | End: 2024-04-15
Payer: MEDICAID

## 2024-04-15 VITALS — WEIGHT: 244 LBS | SYSTOLIC BLOOD PRESSURE: 109 MMHG | DIASTOLIC BLOOD PRESSURE: 55 MMHG | BODY MASS INDEX: 41.88 KG/M2

## 2024-04-15 DIAGNOSIS — Z34.90 ENCOUNTER FOR SUPERVISION OF NORMAL PREGNANCY, UNSPECIFIED, UNSPECIFIED TRIMESTER: ICD-10-CM

## 2024-04-15 DIAGNOSIS — H00.16 CHALAZION LEFT EYE, UNSPECIFIED EYELID: ICD-10-CM

## 2024-04-15 DIAGNOSIS — H40.013 OPEN ANGLE WITH BORDERLINE FINDINGS, LOW RISK, BILATERAL: ICD-10-CM

## 2024-04-15 DIAGNOSIS — H53.8 OTHER VISUAL DISTURBANCES: ICD-10-CM

## 2024-04-15 DIAGNOSIS — E11.9 TYPE 2 DIABETES MELLITUS WITHOUT COMPLICATIONS: ICD-10-CM

## 2024-04-15 PROCEDURE — 99213 OFFICE O/P EST LOW 20 MIN: CPT | Mod: TH

## 2024-04-15 PROCEDURE — 81002 URINALYSIS NONAUTO W/O SCOPE: CPT

## 2024-04-15 PROCEDURE — 92134 CPTRZ OPH DX IMG PST SGM RTA: CPT

## 2024-04-15 PROCEDURE — 76815 OB US LIMITED FETUS(S): CPT

## 2024-04-15 PROCEDURE — 92004 COMPRE OPH EXAM NEW PT 1/>: CPT

## 2024-04-15 NOTE — ASU DISCHARGE PLAN (ADULT/PEDIATRIC) - CARE PROVIDER_API CALL
April 15, 2024    To Whom It May Concern:         This is confirmation that Ann Salguero attended her scheduled appointment with Marion Lacey M.D. on 4/15/24. She may return to work 4/21/2024 without any restrictions.          If you have any questions please do not hesitate to call me at the phone number listed below.    Sincerely,          Marion Lacey M.D.  [phone]                   Donis Rcihey)  Obstetrics and Gynecology  [address] [place] [address]  Phone: [phone]  Fax: [phone]  Follow Up Time: 2 weeks

## 2024-04-16 ENCOUNTER — APPOINTMENT (OUTPATIENT)
Dept: PEDIATRIC CARDIOLOGY | Facility: CLINIC | Age: 34
End: 2024-04-16
Payer: MEDICAID

## 2024-04-16 PROCEDURE — 99205 OFFICE O/P NEW HI 60 MIN: CPT | Mod: 25

## 2024-04-16 PROCEDURE — 76827 ECHO EXAM OF FETAL HEART: CPT

## 2024-04-16 PROCEDURE — 93325 DOPPLER ECHO COLOR FLOW MAPG: CPT

## 2024-04-16 PROCEDURE — 76825 ECHO EXAM OF FETAL HEART: CPT

## 2024-04-16 NOTE — HISTORY OF PRESENT ILLNESS
[FreeTextEntry1] : Dear Dr. Patten:  I had the pleasure of seeing your patient, TAYLOR JAY, in my office today, 04/16/2024. She was referred to pediatric cardiology for fetal echocardiogram.  Maternal diabetes TAYLOR has been doing well from a clinical standpoint. There is no family history of congenital heart disease.  Today's echocardiogram was normal. Please see attached report.

## 2024-04-16 NOTE — REVIEW OF SYSTEMS
[Feeling Poorly] : not feeling poorly (malaise) [Fever] : no fever [Wgt Loss (___ Lbs)] : no recent weight loss [Pallor] : not pale [Eye Discharge] : no eye discharge [Redness] : no redness [Change in Vision] : no change in vision [Nasal Stuffiness] : no nasal congestion [Sore Throat] : no sore throat [Earache] : no earache [Loss Of Hearing] : no hearing loss [Cyanosis] : no cyanosis [Edema] : no edema [Diaphoresis] : not diaphoretic [Chest Pain] : no chest pain or discomfort [Exercise Intolerance] : no persistence of exercise intolerance [Palpitations] : no palpitations [Orthopnea] : no orthopnea [Fast HR] : no tachycardia [Tachypnea] : not tachypneic [Wheezing] : no wheezing [Cough] : no cough [Shortness Of Breath] : not expressed as feeling short of breath [Vomiting] : no vomiting [Diarrhea] : no diarrhea [Abdominal Pain] : no abdominal pain [Decrease In Appetite] : appetite not decreased [Fainting (Syncope)] : no fainting [Seizure] : no seizures [Headache] : no headache [Dizziness] : no dizziness [Limping] : no limping [Joint Pains] : no arthralgias [Joint Swelling] : no joint swelling [Rash] : no rash [Wound problems] : no wound problems [Easy Bruising] : no tendency for easy bruising [Swollen Glands] : no lymphadenopathy [Easy Bleeding] : no ~M tendency for easy bleeding [Nosebleeds] : no epistaxis [Sleep Disturbances] : ~T no sleep disturbances [Hyperactive] : no hyperactive behavior [Anxiety] : no anxiety [Depression] : no depression [Failure To Thrive] : no failure to thrive [Short Stature] : short stature was not noted [Jitteriness] : no jitteriness [Heat/Cold Intolerance] : no temperature intolerance [Dec Urine Output] : no oliguria

## 2024-04-16 NOTE — DISCUSSION/SUMMARY
[FreeTextEntry1] : Normal fetal echocardiogram Reassurance Recommend routine prenatal care and delivery  Please do not hesitate to contact me if you have any questions.   Navdeep Go MD, MS, FAAP, FACC Attending Physician, Pediatric Cardiology [facility] [place] [address] Office: [phone] Fax: [phone] Email: [email or web address]     I have spent 60 minutes of time on the encounter excluding separately reported services.

## 2024-04-17 ENCOUNTER — NON-APPOINTMENT (OUTPATIENT)
Age: 34
End: 2024-04-17

## 2024-04-18 DIAGNOSIS — Z34.90 ENCOUNTER FOR SUPERVISION OF NORMAL PREGNANCY, UNSPECIFIED, UNSPECIFIED TRIMESTER: ICD-10-CM

## 2024-04-19 ENCOUNTER — APPOINTMENT (OUTPATIENT)
Dept: ANTEPARTUM | Facility: CLINIC | Age: 34
End: 2024-04-19

## 2024-04-21 LAB
BILIRUB UR QL STRIP: NORMAL
CLARITY UR: CLEAR
COLLECTION METHOD: NORMAL
GLUCOSE UR-MCNC: 100
HCG UR QL: 0.2 EU/DL
HGB UR QL STRIP.AUTO: NORMAL
KETONES UR-MCNC: NORMAL
LEUKOCYTE ESTERASE UR QL STRIP: NORMAL
NITRITE UR QL STRIP: NORMAL
PH UR STRIP: 6
PROT UR STRIP-MCNC: NORMAL
SP GR UR STRIP: 1.03

## 2024-05-01 ENCOUNTER — APPOINTMENT (OUTPATIENT)
Dept: OPHTHALMOLOGY | Facility: CLINIC | Age: 34
End: 2024-05-01

## 2024-05-03 ENCOUNTER — OUTPATIENT (OUTPATIENT)
Dept: OUTPATIENT SERVICES | Facility: HOSPITAL | Age: 34
LOS: 1 days | End: 2024-05-03
Payer: MEDICAID

## 2024-05-03 ENCOUNTER — ASOB RESULT (OUTPATIENT)
Age: 34
End: 2024-05-03

## 2024-05-03 ENCOUNTER — APPOINTMENT (OUTPATIENT)
Dept: ANTEPARTUM | Facility: CLINIC | Age: 34
End: 2024-05-03
Payer: MEDICAID

## 2024-05-03 DIAGNOSIS — Z34.90 ENCOUNTER FOR SUPERVISION OF NORMAL PREGNANCY, UNSPECIFIED, UNSPECIFIED TRIMESTER: ICD-10-CM

## 2024-05-03 PROCEDURE — 76816 OB US FOLLOW-UP PER FETUS: CPT | Mod: 26

## 2024-05-03 PROCEDURE — 76816 OB US FOLLOW-UP PER FETUS: CPT

## 2024-05-06 ENCOUNTER — NON-APPOINTMENT (OUTPATIENT)
Age: 34
End: 2024-05-06

## 2024-05-06 DIAGNOSIS — O24.112 PRE-EXISTING TYPE 2 DIABETES MELLITUS, IN PREGNANCY, SECOND TRIMESTER: ICD-10-CM

## 2024-05-06 DIAGNOSIS — Z3A.25 25 WEEKS GESTATION OF PREGNANCY: ICD-10-CM

## 2024-05-06 DIAGNOSIS — O98.512 OTHER VIRAL DISEASES COMPLICATING PREGNANCY, SECOND TRIMESTER: ICD-10-CM

## 2024-05-06 DIAGNOSIS — O36.63X0 MATERNAL CARE FOR EXCESSIVE FETAL GROWTH, THIRD TRIMESTER, NOT APPLICABLE OR UNSPECIFIED: ICD-10-CM

## 2024-05-06 DIAGNOSIS — O99.212 OBESITY COMPLICATING PREGNANCY, SECOND TRIMESTER: ICD-10-CM

## 2024-05-06 DIAGNOSIS — O09.512 SUPERVISION OF ELDERLY PRIMIGRAVIDA, SECOND TRIMESTER: ICD-10-CM

## 2024-05-07 ENCOUNTER — APPOINTMENT (OUTPATIENT)
Dept: ANTEPARTUM | Facility: CLINIC | Age: 34
End: 2024-05-07
Payer: MEDICAID

## 2024-05-07 ENCOUNTER — OUTPATIENT (OUTPATIENT)
Dept: OUTPATIENT SERVICES | Facility: HOSPITAL | Age: 34
LOS: 1 days | End: 2024-05-07
Payer: MEDICAID

## 2024-05-07 DIAGNOSIS — O99.519 DISEASES OF THE RESPIRATORY SYSTEM COMPLICATING PREGNANCY, UNSPECIFIED TRIMESTER: ICD-10-CM

## 2024-05-07 DIAGNOSIS — Z3A.25 25 WEEKS GESTATION OF PREGNANCY: ICD-10-CM

## 2024-05-07 DIAGNOSIS — O24.112 PRE-EXISTING TYPE 2 DIABETES MELLITUS, IN PREGNANCY, SECOND TRIMESTER: ICD-10-CM

## 2024-05-07 DIAGNOSIS — O09.519 SUPERVISION OF ELDERLY PRIMIGRAVIDA, UNSPECIFIED TRIMESTER: ICD-10-CM

## 2024-05-07 DIAGNOSIS — O36.60X0 MATERNAL CARE FOR EXCESSIVE FETAL GROWTH, UNSPECIFIED TRIMESTER, NOT APPLICABLE OR UNSPECIFIED: ICD-10-CM

## 2024-05-07 DIAGNOSIS — A60.00 HERPESVIRAL INFECTION OF UROGENITAL SYSTEM, UNSPECIFIED: ICD-10-CM

## 2024-05-07 DIAGNOSIS — O09.90 SUPERVISION OF HIGH RISK PREGNANCY, UNSPECIFIED, UNSPECIFIED TRIMESTER: ICD-10-CM

## 2024-05-07 PROCEDURE — 82948 REAGENT STRIP/BLOOD GLUCOSE: CPT

## 2024-05-07 PROCEDURE — 82962 GLUCOSE BLOOD TEST: CPT

## 2024-05-07 PROCEDURE — 81002 URINALYSIS NONAUTO W/O SCOPE: CPT

## 2024-05-07 PROCEDURE — 99215 OFFICE O/P EST HI 40 MIN: CPT | Mod: TH

## 2024-05-07 PROCEDURE — 99215 OFFICE O/P EST HI 40 MIN: CPT

## 2024-05-07 NOTE — HISTORY OF PRESENT ILLNESS
[FreeTextEntry1] : 33y  at 25w6d, FORREST  by first trimester sonogam, presenting for follow up consultation for T2DM. Currently managed by Dr. Richey. Pregnancy conceived with ovulation induction on clomid.  Patient did not bring FS log today. Complaint with insulin and metformin. Is taking lantus 22AM/22PM, humalog . Patient denies episodes of hypoglycemia. Taking ASA daily. Continues to eat whatever is served at work ([employer]) does not follow diabetic diet. Denies contractions, leakage of fluid, vaginal bleeding. Had scheduled appointment with opthamology on 4/15 for her hordeolum, has follow up scheduled in few weeks. Reports it is improving.   From prior visit: PMHx: DM2: last A1c 9.1. Previously on Ozempic and Syngarty, switched to Glargine daily, Lispro tid. Follows with endocrinologist Dr. Robles. History of asthma. Denies hospitalizations or intubations. Currently on albuterol PRN, symbicort daily, and singular daily. Denies recent asthma exacerbations. Last asthma attack was one year ago when she had RSV. She was seen in the ER but not admitted to the hospital. She is vaccinated for covid x2 and flu.  Allergies: seasonal Meds: albuterol PRN, symbicort daily, and singular daily. Lantus 22U AM and Humalog 2-5.  OB Hx:  MAB with D&C ectopic s/p MTX in  Gyn Hx: H/o HSV. Last outbreak in March. denies abnormal pap smears, fibroids, cysts, stds PMH: Asthma, PCOS, T2DM PSH: D&C FH: denies pertinent hx  SH: Works as manager at [employer]. Denies smoking, alcohol or drug use. Patient discussed barriers to care including difficulty with her insurance and lack of social support. SEBLE is  to another women and continues that relationship. She states he promised her when the baby is born he will leave that relationship to be with her. Patient is adopted. Patients adopted mother passed aware 2-3 months ago from liver cancer and adoptive father  from diabetes when she was 13. Her biological mother struggles with drug addiction. She states she has 1 friend, Marilee who has offered to help her. Patient states her remaining family does not know about the pregnancy. She is concerned to discuss it with them as she knows the FOB is  to another women. She knows she will need to discuss the pregnancy with them. She currently lives alone with her dog and cat.  Psych: denies Genetics: denies h/o genetic abnormalities  Physical Exam: /58 HR: 99 bpm, O2sat: 99 %, Wt: 247<242<239<238lbs<239lbs<236<235 lbs FH 154bpm,  U dip: trace protein GA: AOx3, NAD. Acne rosacea on her face. 1cm hordeolum on left eye. Heart: RRR, no M/R/G Lungs: CTAB Abd: soft, nontender, nondistended LE: no erythema, edema or pain. Atopic dermatitis on upper extremities.  Labs:  A1c 9.1, O neg, antibody neg, Hep B NR, Hep C NR, Rubella: equivocal, Frag X: neg, SMA: neg, TB Quant: Neg,CF neg, NIPTs LR 3/1 A1C 6.6% 3/2 TSH 1.87, T4 9, 24hr   OBUS: : S=D based on  sono : Normal NT screen 3/1: 16w2d, 186g anatomy wnl : 20w5d, LGA 98% (AC 94%) anatomy wnl but incomplete eval of heart  A/P Ms Negro is a 34yo  at 25w6d GA FORREST 8/15/2024 with asthma and T2DM, with persistent hordeulum on her left eye for the past month. 1. T2DM: Poor Control -New insulin regimen: Lantus 22U AM/ 24U PM, lispro , patient will stop metformin! -Previously discussed the risk of end organ damage, including eyes, heart, kidneys in patients with diabetes, as well as the risk of coma and death in patients with uncontrolled diabetes. Counseled on risks to fetus of poorly controlled diabetes in pregnancy. -C/w ASA 81 mg PO daily -S/p dietician counseling -3/1 24hr  mg/24 hours  Previously recommended: - Dilated eye exam and foot exam. Opthamology s/p appointment with f/u scheduled. Referral for podiatry given previously given. - 3/1 EKG: NSR - Referral previously for cardiology given, encouraged to make appointment - Fetal echo - wnl  2. Obesity -Previously discussed that obesity is associated with a host of pregnancy complications. -The patient is at increased risk for preeclampsia and gestational hypertension,  delivery, macrosomia, gestational diabetes, deep venous thrombosis,  delivery, stillbirth and certain birth defects, such as open neural tube defects. - Will need weekly biophysical profiles in third trimester. - Cardiology consult pending  3. Asthma: Has not required albuterol recently. -Well controlled with symbicort daily, singulair daily, albuterol prn -Continue with current regimen, follows closely with Dr. Nash off of [facility].  4. Ocular hordeulum, left - She had a ophthalmology consult during inpatient admission - warm compresses  5. Rosacea -Recommended gentle face cleanser, azelaic acid, and sunscreen -Dermatology referral given  6. First trimester vaginal bleeding, now resolved - Rh neg, s/p Rhogam  - Recommend rhogam at 28w  7. Pregnancy -History of HSV, taking valtrex 1g daily for suppression. Patient would like to continue for remainer of pregnancy. Refill sent today -MSAFP pending, patient encouraged to go -Follow with Dr Richey for prenatal care  Follow up in 1 week for BG check.

## 2024-05-08 ENCOUNTER — EMERGENCY (EMERGENCY)
Facility: HOSPITAL | Age: 34
LOS: 0 days | Discharge: HOME | End: 2024-05-08
Attending: EMERGENCY MEDICINE
Payer: MEDICAID

## 2024-05-08 ENCOUNTER — OUTPATIENT (OUTPATIENT)
Dept: INPATIENT UNIT | Facility: HOSPITAL | Age: 34
LOS: 1 days | Discharge: HOME | End: 2024-05-08
Payer: MEDICAID

## 2024-05-08 VITALS
HEIGHT: 65 IN | RESPIRATION RATE: 18 BRPM | SYSTOLIC BLOOD PRESSURE: 113 MMHG | DIASTOLIC BLOOD PRESSURE: 59 MMHG | HEART RATE: 85 BPM | TEMPERATURE: 99 F | OXYGEN SATURATION: 97 % | WEIGHT: 250 LBS

## 2024-05-08 VITALS — SYSTOLIC BLOOD PRESSURE: 118 MMHG | HEART RATE: 85 BPM | DIASTOLIC BLOOD PRESSURE: 60 MMHG

## 2024-05-08 VITALS
RESPIRATION RATE: 18 BRPM | DIASTOLIC BLOOD PRESSURE: 60 MMHG | HEART RATE: 85 BPM | TEMPERATURE: 98 F | SYSTOLIC BLOOD PRESSURE: 118 MMHG

## 2024-05-08 DIAGNOSIS — O26.899 OTHER SPECIFIED PREGNANCY RELATED CONDITIONS, UNSPECIFIED TRIMESTER: ICD-10-CM

## 2024-05-08 LAB
APPEARANCE UR: ABNORMAL
BACTERIA # UR AUTO: ABNORMAL /HPF
BILIRUB UR-MCNC: NEGATIVE — SIGNIFICANT CHANGE UP
CAST: 0 /LPF — SIGNIFICANT CHANGE UP (ref 0–4)
COLOR SPEC: YELLOW — SIGNIFICANT CHANGE UP
DIFF PNL FLD: ABNORMAL
GLUCOSE BLDC GLUCOMTR-MCNC: 105 MG/DL — HIGH (ref 70–99)
GLUCOSE UR QL: >=1000 MG/DL
KETONES UR-MCNC: NEGATIVE MG/DL — SIGNIFICANT CHANGE UP
LEUKOCYTE ESTERASE UR-ACNC: ABNORMAL
NITRITE UR-MCNC: NEGATIVE — SIGNIFICANT CHANGE UP
PH UR: 6.5 — SIGNIFICANT CHANGE UP (ref 5–8)
PROT UR-MCNC: 30 MG/DL
RBC CASTS # UR COMP ASSIST: 17 /HPF — HIGH (ref 0–4)
SP GR SPEC: >1.03 — HIGH (ref 1–1.03)
SQUAMOUS # UR AUTO: 4 /HPF — SIGNIFICANT CHANGE UP (ref 0–5)
UROBILINOGEN FLD QL: 1 MG/DL — SIGNIFICANT CHANGE UP (ref 0.2–1)
WBC UR QL: 87 /HPF — HIGH (ref 0–5)

## 2024-05-08 PROCEDURE — 76857 US EXAM PELVIC LIMITED: CPT

## 2024-05-08 PROCEDURE — 99214 OFFICE O/P EST MOD 30 MIN: CPT

## 2024-05-08 PROCEDURE — 87077 CULTURE AEROBIC IDENTIFY: CPT

## 2024-05-08 PROCEDURE — 99285 EMERGENCY DEPT VISIT HI MDM: CPT | Mod: 25

## 2024-05-08 PROCEDURE — 82962 GLUCOSE BLOOD TEST: CPT

## 2024-05-08 PROCEDURE — 81001 URINALYSIS AUTO W/SCOPE: CPT

## 2024-05-08 PROCEDURE — 87086 URINE CULTURE/COLONY COUNT: CPT

## 2024-05-08 PROCEDURE — 59025 FETAL NON-STRESS TEST: CPT | Mod: 26

## 2024-05-08 PROCEDURE — 99283 EMERGENCY DEPT VISIT LOW MDM: CPT | Mod: 25

## 2024-05-08 PROCEDURE — 76815 OB US LIMITED FETUS(S): CPT | Mod: 26,59

## 2024-05-08 PROCEDURE — 99223 1ST HOSP IP/OBS HIGH 75: CPT | Mod: 25

## 2024-05-08 RX ORDER — CEPHALEXIN 500 MG
500 CAPSULE ORAL ONCE
Refills: 0 | Status: COMPLETED | OUTPATIENT
Start: 2024-05-08 | End: 2024-05-08

## 2024-05-08 RX ORDER — CEPHALEXIN 500 MG
1 CAPSULE ORAL
Qty: 20 | Refills: 0
Start: 2024-05-08 | End: 2024-05-12

## 2024-05-08 RX ADMIN — Medication 500 MILLIGRAM(S): at 06:08

## 2024-05-08 NOTE — OB PROVIDER TRIAGE NOTE - NSICDXPASTMEDICALHX_GEN_ALL_CORE_FT
PAST MEDICAL HISTORY:  Asthma     Diabetes mellitus, type 2     Eczema     PCOS (polycystic ovarian syndrome)     Rh negative status during pregnancy      PAST MEDICAL HISTORY:  Asthma     Diabetes mellitus, type 2     Eczema     PCOS (polycystic ovarian syndrome)     Rh negative status during pregnancy     Rosacea

## 2024-05-08 NOTE — OB PROVIDER TRIAGE NOTE - HISTORY OF PRESENT ILLNESS
33 yr old  at 26 wks sent from ED; initally had 3 hr hx of dysuria with suprpubic pressure and spasms and one incidnet of mild hematuria.  Denies fever, chills, flank pain, ctx, VB.  Reports (+) FM.  Pt states she feels better since ED visit.  Was given 500 mg Keflex and rx was sent to pharm.  Pt has hx of PCOS and infertility, was on letrozole this pregnancy.      -pre-gestational dm, reports FS in 130s  and 2 hr PP in 160s.  States her insulin regimen was just adjusted yesterday  due to these numbers by MFM. Was in house for sugar control in   -Asthma: on Symbicort and singulair: last used albuterol few days ago; usually uses 1-2 days a week.  Never hospitalized or intubated  -HSV (+) on valtrex- reports last genital outbreak was prior to pregnancy.  -Hx of depression- previously on SSRIs; not currently on anything  -BMI 41 On ASA 81 mg  -Rh NEG- received rhogam 24 due to bleeding 33 yr old  at 26 wks sent from ED; initally had 3 hr hx of dysuria with suprpubic pressure and spasms and one incidnet of mild hematuria.  Denies fever, chills, flank pain, ctx, VB.  Reports (+) FM.  Pt states she feels better since ED visit.  Was given 500 mg Keflex and rx was sent to pharm.  Pt has hx of PCOS and infertility, was on letrozole this pregnancy.      -pre-gestational dm, reports FS in 130s  and 2 hr PP in 160s.  States her insulin regimen was just adjusted yesterday  due to these numbers by M. Was in house for sugar control in           new regimen: Lantus 22U AM/ 24U PM, lispro , patient will stop metformin               -hordeleum in left eye- following with ophthalmalogist        -s/p Cardiologist consult- EKG NSR- still needs f/u         - has appt with podiatry  -Asthma: on Symbicort and singulair: last used albuterol few days ago; usually uses 1-2 days a week.  Never hospitalized or intubated  -HSV (+) on valtrex 1 gm daily- reports last genital outbreak was March  -Hx of depression- previously on SSRIs; not currently on anything  -BMI 41 On ASA 81 mg  -Rh NEG- received rhogam 24 due to bleeding  -rosacea in pregnancy

## 2024-05-08 NOTE — OB PROVIDER TRIAGE NOTE - NSHPLABSRESULTS_GEN_ALL_CORE
12/28 A1c 9.1, O neg, antibody neg, Hep B NR, Hep C NR, Rubella: equivocal, Frag X: neg, SMA: neg, TB Quant: Neg,CF neg, NIPTs LR  3/1 A1C 6.6%  3/2 TSH 1.87, T4 9, 24hr     OBUS:  1/25: S=D based on 12/28 sono  2/7: Normal NT screen  3/1: 16w2d, 186g anatomy wnl  4/1: 20w5d, LGA 98% (AC 94%) anatomy wnl but incomplete eval of heart  ?

## 2024-05-08 NOTE — OB PROVIDER TRIAGE NOTE - NS ATTEND AMEND GEN_ALL_CORE FT
26weeks with UTI  no obesteric complaints  EFM appropirate for gestational age, difficult to trace, sono BPP is WNL  VS WNL  precautions given

## 2024-05-08 NOTE — OB PROVIDER TRIAGE NOTE - NSHPPHYSICALEXAM_GEN_ALL_CORE
Vital Signs (24 Hrs):  T(C): 36.8 (05-08-24 @ 06:25), Max: 37 (05-08-24 @ 04:32)  HR: 85 (05-08-24 @ 06:33) (85 - 85)  BP: 118/60 (05-08-24 @ 06:33) (113/59 - 118/60)  RR: 18 (05-08-24 @ 06:25) (18 - 18)  SpO2: 97% (05-08-24 @ 04:32) (97% - 97%)      Gen: NAD  head: Erythema noted diffuse without inflammation  Abd: gravid, obese, soft  no CVAT  VE: deferred  FHR: 133 on sono unable to trace due to maternal habitus  TOCO: none

## 2024-05-08 NOTE — ED PROVIDER NOTE - PROVIDER TOKENS
[FreeTextEntry1] : Patient is a 77-year-old gentleman with past medical history of hypertension, hypothyroid, COPD, hyperlipidemia, previous lung cancer with lumpectomy of left lower lobe of the lung, and recent hospitalization for cholangitis secondary to choledocholithiasis. Patient had ERCP at [facility] done in May of 2019 with sphincterotomy, stone extraction, and hemostasis. Patient later had cholecystectomy at [facility] in early July 2019. Patient is s/p EGD and colonoscopy done 9/2020. EGD without IM nor H.Pylori. Colonoscopy without dysplasia and prior scar site biopsies also without dysplasia. Patient feels well but notes cramping daily in the morning prior to having loose, non-bloody stools. He has not had change in weight, change in appetite, nor change in medications.  Was following  in the past. \par \par Diarrhea, Daily urgency with lose stools \par - EGD and Colonoscopy reviewed with patient\par - Chronic IBS-D symptoms, not severe, advised daily benefiber use, and trial of IB-Guard otc  FREE:[LAST:[Yeimi],FIRST:[],PHONE:[(   )    -],FAX:[(   )    -],FOLLOWUP:[1-3 Days]]

## 2024-05-08 NOTE — ED PROVIDER NOTE - PHYSICAL EXAMINATION
Initial SW/CM Assessment/Plan of Care Note     Baseline Assessment  66 year old admitted 12/3/2020 as Inpatient with a diagnosis of covid.   Prior to admission patient was living with Spouse/significant other and residing at House. . Patient’s Primary Care Provider is Satya Freitas MD.     Medical History  Past Medical History:   Diagnosis Date   • Diabetes mellitus (CMS/HCC)    • Essential (primary) hypertension    • High cholesterol        Prior to Admission Status  Functional Status  Ambulation: Independent/Self  Bathing: Independent/Self  Dressing: Independent/Self  Toileting: Independent/Self  Meal Preparation: Significant Other  Shopping: Independent/Self, Significant Other  Medication Preparation: Independent/Self  Housekeeping: Independent/Self, Significant Other  Laundry: Significant Other  Transportation: Independent/Self    Agency/Support  Type of Services Prior to Hospitalization: None  Support Systems: Spouse/Significant other, Family members  Home Devices/Equipment: Blood pressure monitor, Blood glucose monitor  Mobility Assist Devices: None  Sensory Support Devices: Eyeglasses    Current Status  PT Ambulation Tips:    PT Transfer Tips:     OT Bathing Tips:    OT Dressing Tips:    OT Toileting Tips:    OT Feeding Tips:    SLP Swallow/Feeding Tips:    SLP Comm/Cog Tips:    Current Mental Status: Pleasant  Stressors: Health Status    Insurance  Primary: [employer] MEDICARE ADVANTAGE  Secondary: N/A    Barriers to Discharge  Identified Barriers to Discharge/Transition Planning: Assessment/stabilization in progress    Progress Note  Spoke with pt over the phone. From home with spouse. indep prior to admission. Has no me or services in place. rx coverage with mail delivery. Plans to return home at [place]. Family will provide trasnport. Will follow for needs.     Plan  SW/CM - Recommendations for Discharge:     PT - Recommendations for Discharge:      OT - Recommendations for Discharge:      SLP - Recommendations for  Discharge:     Anticipate patient will not need post-hospital services. Necessary services are available.  Anticipate patient can return to the environment from which patient entered the hospital.   Anticipate patient can provide self-care at discharge.    Refer to / Flowsheet for Goals and objective data.      VITAL SIGNS: I have reviewed nursing notes and confirm.  CONSTITUTIONAL: Well-developed; well-nourished; in no acute distress.  SKIN: Skin exam is warm and dry, no acute rash.  HEAD: Normocephalic; atraumatic.  EYES: PERRL, EOM intact; conjunctiva and sclera clear.  ENT: No nasal discharge; airway clear.   NECK: Supple; non tender.  CARD: S1, S2 normal; no murmurs, gallops, or rubs. Regular rate and rhythm.  RESP: No wheezes, rales or rhonchi.  ABD: Normal bowel sounds; soft; +gravid; non-tender; no hepatosplenomegaly.  EXT: Normal ROM. No clubbing, cyanosis or edema.  LYMPH: No acute cervical adenopathy.  NEURO: Alert, oriented. Grossly unremarkable. No focal deficits.  PSYCH: Cooperative, appropriate.

## 2024-05-08 NOTE — ED ADULT TRIAGE NOTE - CHIEF COMPLAINT QUOTE
I'm six months pregnant, I was fine all day, I went to the doctor, Tonight It hurt to pee and it was light pink - patient   Patient LMP 10/23, due 8/15/24

## 2024-05-08 NOTE — ED PROVIDER NOTE - OBJECTIVE STATEMENT
33-year-old female , FORREST 2024, past medical history of gestational diabetes, asthma, presents for increased urinary frequency, foul-smelling urine, cloudy urine, and 1 episode of blood in the urine.  Symptoms all started approximately 3 hours prior to arrival.  Patient states she is having some pelvic/bladder pressure with urination, has had this in the past which went away on its own.  But given that she is now 6 months pregnant, and she still having the urinary urgency and frequency came to the ER for evaluation to rule out a UTI.  Patient denies any vaginal bleeding/contraction-like cramping, fever, chills, nausea, vomiting, diarrhea, rashes, chest pain, shortness of breath.    ALL: nkda  Meds Lantus, Humalog, ASA, SIngulair, Prenatal vitamins  SH no smoking, +etoh occ prior to pregnancy  PMD/OB-Gyn: Dr Jalloh  Pharmacy: [facility] [id number]

## 2024-05-08 NOTE — ED PROVIDER NOTE - CLINICAL SUMMARY MEDICAL DECISION MAKING FREE TEXT BOX
33-year-old female , FORREST 2024, past medical history of gestational diabetes, asthma, presents for increased urinary frequency, foul-smelling urine, cloudy urine, and 1 episode of blood in the urine.  Symptoms all started approximately 3 hours prior to arrival.  Patient states she is having some pelvic/bladder pressure with urination, has had this in the past which went away on its own.  But given that she is now 6 months pregnant, and she still having the urinary urgency and frequency came to the ER for evaluation to rule out a UTI.  Patient denies any vaginal bleeding/contraction-like cramping, fever, chills, nausea, vomiting, diarrhea, rashes, chest pain, shortness of breath.    . UA + for infection. Started Keflex in ER. Sent to LND for further fetal eval given pt 20 weeks preg

## 2024-05-08 NOTE — OB PROVIDER TRIAGE NOTE - NSOBPROVIDERNOTE_OBGYN_ALL_OB_FT
33 yr old  at 26 wks, UTI with multiple comorbidities, with reassuring maternal and fetal well being    d.c to home  revivewed Diet and BG monitoring  Finish Abx course as sent to pharm  reviewed PTL precautions    Dr Vladimir Burrows aware

## 2024-05-09 LAB
AFP MOM: 0.77
AFP VALUE: 45.7 NG/ML
ALPHA FETOPROTEIN SERUM COMMENT: NORMAL
ALPHA FETOPROTEIN SERUM INTERPRETATION: NORMAL
ALPHA FETOPROTEIN SERUM RESULTS: NORMAL
ALPHA FETOPROTEIN SERUM TEST RESULTS: NORMAL
BACTERIA UR CULT: NORMAL
BILIRUB UR QL STRIP: NORMAL
BP DIAS: 53 MM HG
BP SYS: 108 MM HG
CLARITY UR: CLEAR
COLLECTION METHOD: NORMAL
FETAL HEART RATE (BPM): 147
FETAL MOVEMENT: PRESENT
GESTATIONAL AGE BASED ON: NORMAL
GESTATIONAL AGE ON COLLECTION DATE: 20.1 WEEKS
GLUCOSE BLDC GLUCOMTR-MCNC: 96
GLUCOSE UR-MCNC: NORMAL
HCG UR QL: 0.2 EU/DL
HGB UR QL STRIP.AUTO: NORMAL
INSULIN DEP DIABETES: YES
KETONES UR-MCNC: NORMAL
LEUKOCYTE ESTERASE UR QL STRIP: NORMAL
MATERNAL AGE AT EDD AFP: 35.9 YR
MULTIPLE GESTATION: NO
NITRITE UR QL STRIP: NORMAL
OB COMMENTS: NORMAL
OSBR RISK 1 IN: 8771
PH UR STRIP: 5
PROT UR STRIP-MCNC: NORMAL
RACE: NORMAL
SCHEDULED VISIT: YES
SP GR UR STRIP: 1.02
URINE ALBUMIN/PROTEIN: NORMAL
URINE GLUCOSE: 100
URINE KETONES: NORMAL
WEEKS GESTATION: 20.5
WEIGHT AFP: 107 LBS

## 2024-05-09 NOTE — DISCUSSION/SUMMARY
[FreeTextEntry1] : 24 MFM Att'g & PGY-3 f/u consult note:  33y  at 25w6d (FORREST  by [facility]), presenting for follow up consultation for T2DM. Currently managed by Dr. Richey. Pregnancy conceived with ovulation induction on clomid. Patient did not bring FS log today. Compliant with insulin and metformin. Is taking lantus 22AM/22PM, humalog . Patient denies episodes of hypoglycemia. Taking ASA daily. Continues to eat whatever is served at work ([employer]) does not follow diabetic diet. Denies contractions, leakage of fluid, vaginal bleeding. Had scheduled appointment with opthamology on 4/15 for her hordeolum, has follow up scheduled in few weeks. Reports it is improving.  PMHx: DM2: last A1c 9.1. Previously on Ozempic and Syngarty, switched to Glargine daily, Lispro tid.                Follows with endocrinologist Dr. Robles.             Asthma. Denies hospitalizations or intubations. Currently on albuterol PRN, symbicort daily, and singular daily. Denies recent asthma exacerbations. Last asthma attack was one year ago when she had RSV. She was seen in the ER but not admitted to the hospital.            PCOS Meds:    albuterol PRN, symbicort daily, and singular daily.    Glargine 22U AM and Lispro 2-5 Units ac.    Metformin: 500mg daily at different times, forgets often.    D/C'd .  Allergies: seasonal OB Hx: , Missed AB with D&C,  ectopic s/p MTX in  Gyn Hx: H/o HSV. Last outbreak in March. denies abnormal pap smears, fibroids, cysts, stds FH: denies pertinent hx, she was adopted. 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 Denies smoking, alcohol or drug use. Patient discussed barriers to care including difficulty with her insurance and lack of social support. FOB is  to another women and continues that relationship. She states he promised her when the baby is born he will leave that relationship to be with her. Patient is adopted. Patients adopted mother passed aware 2-3 months ago from liver cancer and adoptive father  from diabetes when she was 13. Her biological mother struggles with drug addiction. She states she has 1 friend, Marilee who has offered to help her. Iram's remaining family does not know about the pregnancy. She is reluctant to discuss it with them as she knows the FOB is  to another women. She knows she will eventually need to discuss the pregnancy with them. She currently lives alone with her dog and cat.  No psychiatric history. Vaccinations:  Vaccinated for CoVID x2 and Influenza last . Family Planning: per Dr Richey.  ROS:  as above.   Px:  Attentive and cooperative woman, moves easily about room. VS: /58 HR: 99 bpm, O2sat: 99 %, Wt: 247<242<239<238lbs<239lbs<236<235 lbs FH 154bpm,  U dip: trace protein HEENT: Acne rosacea on her face. 1cm hordeolum on left eye. Heart: RRR, no M/R/G Lungs: CTAB Abd: soft, nontender, nondistended LE: no erythema, edema or pain. Atopic dermatitis on upper extremities.  LAB:  Oneg/AntibNeg.  HbEP AA 23 A1c 9.1, Hep B NR, Hep C NR, Rubella: equivocal, Frag X: neg, SMA: neg, TB Quant: Neg,CF neg, NIPTs LR 24: Panorama risk reducing.  3/1/24     A1C 6.6%;   mg/24 hours 3/28  UCx nl.  MSAFP risk reducing.  3/2 TSH 1.87, T4 9, 24hr   OBUS: : S=D based on  sono : Normal NT screen 3/1: 16w2d, 186g anatomy wnl : 20w5d, LGA 98% (AC 94%) anatomy wnl but incomplete eval of heart  Impression/Recommendations:  Ms Negro is a 32yo  at 25w6d GA FORREST 8/15/2024 with asthma and T2DM, with persistent hordeulum on her left eye for the past month. 1. T2DM: Improving but suboptimal Control, partly due to irregular dosing of metformin.   -New insulin regimen: Glargine 22U AM/ 24U PM, lispro 7/7/7, patient will stop metformin! -Previously discussed the risk of end organ damage, including eyes, heart, kidneys in patients with diabetes, as well as the risk of coma and death in patients with uncontrolled diabetes. Counseled on risks to fetus of poorly controlled diabetes in pregnancy. -C/w ASA 81 mg PO daily -S/p dietician counseling -3/1 24hr Previously recommended: - Dilated eye exam and foot exam. Opthamology s/p appointment with f/u scheduled. Referral for podiatry given previously given. - 3/1 EKG: NSR - Referral previously for cardiology given, encouraged to make appointment - Fetal echo - wnl  2. Obesity -Previously discussed that obesity is associated with a host of pregnancy complications. -The patient is at increased risk for preeclampsia and gestational hypertension,  delivery, macrosomia, gestational diabetes, deep venous thrombosis,  delivery, stillbirth and certain birth defects, such as open neural tube defects. - Will need weekly biophysical profiles in third trimester. - Cardiology consult pending 3. Asthma: Has not required albuterol recently. -Well controlled with symbicort daily, singulair daily, albuterol prn -Continue with current regimen, follows closely with Dr. Nash off of [facility]. -->If nighttime wheezing, low threshold for PPI.  4. Ocular hordeulum, left - She had a ophthalmology consult during inpatient admission - warm compresses 5. Rosacea -Recommended gentle face cleanser, azelaic acid, and sunscreen -Dermatology referral given 6. RhNeg.  First trimester vaginal bleeding, now resolved  s/p Rhogam  - Recommend rhogam at 28w 7. Pregnancy -History of HSV, Continue Valtrex 1g daily for suppression. Patient would like to continue for remainer of pregnancy. Refill sent. -Follow with Dr Richey for prenatal care  Follow up in 1 week for BG check.

## 2024-05-09 NOTE — END OF VISIT
[] : Resident [Time Spent: ___ minutes] : I have spent [unfilled] minutes of time on the encounter. [FreeTextEntry3] : T2DM with improving, but still suboptimal BS control.  We will stop metformin, continue split dose insulin & consider switching to Detemir bid in the 3rd tri due to safer postpartum pharmacokinetics.

## 2024-05-10 ENCOUNTER — NON-APPOINTMENT (OUTPATIENT)
Age: 34
End: 2024-05-10

## 2024-05-10 DIAGNOSIS — O99.512 DISEASES OF THE RESPIRATORY SYSTEM COMPLICATING PREGNANCY, SECOND TRIMESTER: ICD-10-CM

## 2024-05-10 DIAGNOSIS — O09.12 SUPERVISION OF PREGNANCY WITH HISTORY OF ECTOPIC PREGNANCY, SECOND TRIMESTER: ICD-10-CM

## 2024-05-10 DIAGNOSIS — O99.282 ENDOCRINE, NUTRITIONAL AND METABOLIC DISEASES COMPLICATING PREGNANCY, SECOND TRIMESTER: ICD-10-CM

## 2024-05-10 DIAGNOSIS — Z3A.26 26 WEEKS GESTATION OF PREGNANCY: ICD-10-CM

## 2024-05-10 DIAGNOSIS — R35.0 FREQUENCY OF MICTURITION: ICD-10-CM

## 2024-05-10 DIAGNOSIS — O23.43 UNSPECIFIED INFECTION OF URINARY TRACT IN PREGNANCY, THIRD TRIMESTER: ICD-10-CM

## 2024-05-10 DIAGNOSIS — E28.2 POLYCYSTIC OVARIAN SYNDROME: ICD-10-CM

## 2024-05-10 DIAGNOSIS — O24.419 GESTATIONAL DIABETES MELLITUS IN PREGNANCY, UNSPECIFIED CONTROL: ICD-10-CM

## 2024-05-10 DIAGNOSIS — O09.292 SUPERVISION OF PREGNANCY WITH OTHER POOR REPRODUCTIVE OR OBSTETRIC HISTORY, SECOND TRIMESTER: ICD-10-CM

## 2024-05-10 DIAGNOSIS — J45.909 UNSPECIFIED ASTHMA, UNCOMPLICATED: ICD-10-CM

## 2024-05-10 DIAGNOSIS — O09.522 SUPERVISION OF ELDERLY MULTIGRAVIDA, SECOND TRIMESTER: ICD-10-CM

## 2024-05-10 DIAGNOSIS — Z3A.20 20 WEEKS GESTATION OF PREGNANCY: ICD-10-CM

## 2024-05-10 DIAGNOSIS — O23.42 UNSPECIFIED INFECTION OF URINARY TRACT IN PREGNANCY, SECOND TRIMESTER: ICD-10-CM

## 2024-05-10 DIAGNOSIS — Z86.32 PERSONAL HISTORY OF GESTATIONAL DIABETES: ICD-10-CM

## 2024-05-10 LAB
CULTURE RESULTS: ABNORMAL
SPECIMEN SOURCE: SIGNIFICANT CHANGE UP

## 2024-05-13 ENCOUNTER — OUTPATIENT (OUTPATIENT)
Dept: OUTPATIENT SERVICES | Facility: HOSPITAL | Age: 34
LOS: 1 days | End: 2024-05-13
Payer: MEDICAID

## 2024-05-13 ENCOUNTER — APPOINTMENT (OUTPATIENT)
Dept: OBGYN | Facility: CLINIC | Age: 34
End: 2024-05-13
Payer: MEDICAID

## 2024-05-13 ENCOUNTER — APPOINTMENT (OUTPATIENT)
Dept: ANTEPARTUM | Facility: CLINIC | Age: 34
End: 2024-05-13
Payer: MEDICAID

## 2024-05-13 VITALS
BODY MASS INDEX: 42.92 KG/M2 | HEART RATE: 93 BPM | SYSTOLIC BLOOD PRESSURE: 118 MMHG | RESPIRATION RATE: 20 BRPM | OXYGEN SATURATION: 100 % | WEIGHT: 251.4 LBS | DIASTOLIC BLOOD PRESSURE: 57 MMHG | HEIGHT: 64 IN

## 2024-05-13 VITALS
HEART RATE: 100 BPM | WEIGHT: 249 LBS | DIASTOLIC BLOOD PRESSURE: 54 MMHG | OXYGEN SATURATION: 100 % | BODY MASS INDEX: 42.74 KG/M2 | SYSTOLIC BLOOD PRESSURE: 119 MMHG

## 2024-05-13 DIAGNOSIS — Z34.90 ENCOUNTER FOR SUPERVISION OF NORMAL PREGNANCY, UNSPECIFIED, UNSPECIFIED TRIMESTER: ICD-10-CM

## 2024-05-13 DIAGNOSIS — O09.93 SUPERVISION OF HIGH RISK PREGNANCY, UNSPECIFIED, THIRD TRIMESTER: ICD-10-CM

## 2024-05-13 PROBLEM — L71.9 ROSACEA, UNSPECIFIED: Chronic | Status: ACTIVE | Noted: 2024-05-08

## 2024-05-13 PROBLEM — O26.899 OTHER SPECIFIED PREGNANCY RELATED CONDITIONS, UNSPECIFIED TRIMESTER: Chronic | Status: ACTIVE | Noted: 2024-05-08

## 2024-05-13 LAB
BILIRUB UR QL STRIP: NORMAL
BP DIAS: 54 MM HG
BP SYS: 119 MM HG
CLARITY UR: CLEAR
COLLECTION METHOD: NORMAL
FETAL HEART RATE (BPM): 141
FETAL MOVEMENT: PRESENT
GLUCOSE BLDC GLUCOMTR-MCNC: 114
GLUCOSE UR-MCNC: NORMAL
HCG UR QL: 0.2 EU/DL
HGB UR QL STRIP.AUTO: NORMAL
KETONES UR-MCNC: NORMAL
LEUKOCYTE ESTERASE UR QL STRIP: NORMAL
NITRITE UR QL STRIP: NORMAL
OB COMMENTS: NORMAL
PH UR STRIP: 5
PROT UR STRIP-MCNC: NORMAL
SCHEDULED VISIT: YES
SP GR UR STRIP: 1.02
URINE ALBUMIN/PROTEIN: NORMAL
URINE GLUCOSE: NORMAL
URINE KETONES: NORMAL
WEEKS GESTATION: 26.5

## 2024-05-13 PROCEDURE — 99214 OFFICE O/P EST MOD 30 MIN: CPT | Mod: TH

## 2024-05-13 PROCEDURE — 82948 REAGENT STRIP/BLOOD GLUCOSE: CPT

## 2024-05-13 PROCEDURE — 99213 OFFICE O/P EST LOW 20 MIN: CPT | Mod: TH

## 2024-05-13 PROCEDURE — 81002 URINALYSIS NONAUTO W/O SCOPE: CPT

## 2024-05-13 PROCEDURE — 99214 OFFICE O/P EST MOD 30 MIN: CPT

## 2024-05-13 PROCEDURE — 82962 GLUCOSE BLOOD TEST: CPT

## 2024-05-13 PROCEDURE — 99213 OFFICE O/P EST LOW 20 MIN: CPT

## 2024-05-13 RX ORDER — SULFAMETHOXAZOLE AND TRIMETHOPRIM 800; 160 MG/1; MG/1
800-160 TABLET ORAL
Qty: 10 | Refills: 0 | Status: ACTIVE | COMMUNITY
Start: 2024-05-13 | End: 1900-01-01

## 2024-05-13 NOTE — DISCUSSION/SUMMARY
[FreeTextEntry1] : 24 MFM Att'g & PGY-3 f/u consult note: Ms Negro is referred by Dr Richey. 33y  at 26w5d (FORREST  by [facility]), with pregnancy c/b consultation for T2DM. This pregnancy conceived with ovulation induction on clomid. Patient did not bring completed FS log today. She did bring list of FS.  As expected after d/c metformin, fbs 112-122, 2PP dinner: 134-150. Compliant with lantus 24AM/24 PM, lispro . Patient denies episodes of hypoglycemia. Taking ASA daily. Continues to eat whatever is served at work ([employer]) does not follow diabetic diet. Denies contractions, leakage of fluid, vaginal bleeding. Had scheduled appointment with opthamology on 4/15 for her hordeolum, has follow up scheduled in few weeks. Reports it is improving. PMHx: DM2: last A1c 6.6% 3/1. Previously on Ozempic and Syngarty, switched to Glargine daily, Lispro tid.   Follows with endocrinologist Dr. Robles.   Asthma. Denies hospitalizations or intubations. Currently on albuterol PRN, symbicort daily, and singular daily. Denies recent asthma exacerbations. Last asthma attack was one year ago when she had RSV. She was seen in the ER but not admitted to the hospital.   PCOS Meds:    albuterol PRN, symbicort daily, and singular daily.    Glargine 24AM/24PM, lispro     Metformin: 500mg daily at different times, forgets often. D/C'd . Allergies: seasonal OB Hx: , Missed AB with D&C, ectopic s/p MTX in  Gyn Hx: H/o HSV. Last outbreak in March. denies abnormal pap smears, fibroids, cysts, stds FH: denies pertinent hx, she was adopted. 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 Denies smoking, alcohol or drug use. Patient discussed barriers to care including difficulty with her insurance and lack of social support. FOJUANIS is  to another women and continues that relationship. She states he promised her when the baby is born he will leave that relationship to be with her. Patient is adopted. Patients adopted mother passed aware 2-3 months ago from liver cancer and adoptive father  from diabetes when she was 13. Her biological mother struggles with drug addiction. She states she has 1 friend, Marilee who has offered to help her. Iram's remaining family does not know about the pregnancy. She is reluctant to discuss it with them as she knows the FOB is  to another women. She knows she will eventually need to discuss the pregnancy with them. She currently lives alone with her dog and cat. No psychiatric history. Vaccinations: Vaccinated for CoVID x2 and Influenza last . Family Planning: per Dr Richey. ROS: as above.  Px: Attentive and cooperative woman, moves easily about room. VS: /54 HR: 100 bpm, O2sat: 100 %, Wt: 249<247<242<239<238lbs<239lbs<236<235 lbs FH 154bpm,  U dip: trace protein HEENT: Acne rosacea on her face. 1cm hordeolum on left eye. Heart: RRR, no M/R/G Lungs: CTAB Abd: soft, nontender, nondistended LE: no erythema, edema or pain. Atopic dermatitis on upper extremities.  LAB: Oneg/AntibNeg. HbEP AA 23 A1c 9.1, Hep B NR, Hep C NR, Rubella: equivocal, Frag X: neg, SMA: neg, TB Quant: Neg,CF neg, NIPTs LR 24: Panorama risk reducing. 3/1/24 A1C 6.6%;  mg/24 hours 3/28 UCx nl. MSAFP risk reducing. 3/2 TSH 1.87, T4 9, 24hr   OBUS: : S=D based on  sono : Normal NT screen 3/1: 16w2d, 186g anatomy wnl : 20w5d, LGA 98% (AC 94%) anatomy wnl but incomplete eval of heart  Impression/Recommendations: Ms Negro is a 34yo  at 26w5d GA FORREST 8/15/2024 with asthma and T2DM, with persistent hordeulum on her left eye for the past month. 1. T2DM: Improving but suboptimal Control, partly due to irregular dosing of metformin.    Current Regimen:       Glargine 24AM/24PM, lispro  -->New insulin regimen: Glargine 24U AM/ 28U PM, lispro , metformin dc'd last visit.     We will be able to increase am insulin when 2hpc breakfast and lunch are available.  -Previously discussed the risk of end organ damage, including eyes, heart, kidneys in patients with diabetes, as well as the risk of coma and death in patients with uncontrolled diabetes. Counseled on risks to fetus of poorly controlled diabetes in pregnancy. -C/w ASA 81 mg PO daily -S/p dietician counseling -3/1 24hr Previously recommended: - Dilated eye exam and foot exam. Opthamology s/p appointment with f/u scheduled. Referral for podiatry given previously given. - 3/1 EKG: NSR - Referral previously for cardiology given, encouraged to make appointment - Fetal echo - wnl  2. Obesity -Previously discussed that obesity is associated with a host of pregnancy complications. -The patient is at increased risk for preeclampsia and gestational hypertension,  delivery, macrosomia, gestational diabetes, deep venous thrombosis,  delivery, stillbirth and certain birth defects, such as open neural tube defects. - Will need weekly biophysical profiles in third trimester. - Cardiology consult pending 3. Asthma: Has not required albuterol recently. -Well controlled with symbicort daily, singulair daily, albuterol prn -Continue with current regimen, follows closely with Dr. Nsah off of [facility]. -->If nighttime wheezing, low threshold for PPI. 4. Ocular hordeulum, left - She had a ophthalmology consult during inpatient admission - warm compresses 5. Rosacea -Recommended gentle face cleanser, azelaic acid, and sunscreen -Dermatology referral given 6. RhNeg. First trimester vaginal bleeding, now resolved s/p Rhogam  - Recommend rhogam at 28w 7. Pregnancy -History of HSV, Continue Valtrex 1g daily for suppression. Patient would like to continue for remainer of pregnancy. Refill sent. -Follow with Dr Richey for prenatal care 8. UTI diagnosed in ER -bactrim DS BID x5 days sent to pharmacy  Follow up in 1 week for BG check. MD Hiram, FACOG with MD Clifton, PGY-3

## 2024-05-15 DIAGNOSIS — Z3A.26 26 WEEKS GESTATION OF PREGNANCY: ICD-10-CM

## 2024-05-15 DIAGNOSIS — O99.519 DISEASES OF THE RESPIRATORY SYSTEM COMPLICATING PREGNANCY, UNSPECIFIED TRIMESTER: ICD-10-CM

## 2024-05-15 DIAGNOSIS — O24.112 PRE-EXISTING TYPE 2 DIABETES MELLITUS, IN PREGNANCY, SECOND TRIMESTER: ICD-10-CM

## 2024-05-15 DIAGNOSIS — O09.519 SUPERVISION OF ELDERLY PRIMIGRAVIDA, UNSPECIFIED TRIMESTER: ICD-10-CM

## 2024-05-15 DIAGNOSIS — O36.63X1 MATERNAL CARE FOR EXCESSIVE FETAL GROWTH, THIRD TRIMESTER, FETUS 1: ICD-10-CM

## 2024-05-15 DIAGNOSIS — Z34.90 ENCOUNTER FOR SUPERVISION OF NORMAL PREGNANCY, UNSPECIFIED, UNSPECIFIED TRIMESTER: ICD-10-CM

## 2024-05-16 ENCOUNTER — OUTPATIENT (OUTPATIENT)
Dept: OUTPATIENT SERVICES | Facility: HOSPITAL | Age: 34
LOS: 1 days | End: 2024-05-16
Payer: MEDICAID

## 2024-05-16 DIAGNOSIS — Z34.90 ENCOUNTER FOR SUPERVISION OF NORMAL PREGNANCY, UNSPECIFIED, UNSPECIFIED TRIMESTER: ICD-10-CM

## 2024-05-16 PROCEDURE — 36415 COLL VENOUS BLD VENIPUNCTURE: CPT

## 2024-05-16 PROCEDURE — 81420 FETAL CHRMOML ANEUPLOIDY: CPT

## 2024-05-17 DIAGNOSIS — Z34.90 ENCOUNTER FOR SUPERVISION OF NORMAL PREGNANCY, UNSPECIFIED, UNSPECIFIED TRIMESTER: ICD-10-CM

## 2024-05-18 ENCOUNTER — NON-APPOINTMENT (OUTPATIENT)
Age: 34
End: 2024-05-18

## 2024-05-18 ENCOUNTER — EMERGENCY (EMERGENCY)
Facility: HOSPITAL | Age: 34
LOS: 0 days | Discharge: HOME | End: 2024-05-18
Attending: STUDENT IN AN ORGANIZED HEALTH CARE EDUCATION/TRAINING PROGRAM
Payer: MEDICAID

## 2024-05-18 VITALS
HEIGHT: 65 IN | OXYGEN SATURATION: 99 % | DIASTOLIC BLOOD PRESSURE: 64 MMHG | WEIGHT: 250 LBS | TEMPERATURE: 98 F | SYSTOLIC BLOOD PRESSURE: 112 MMHG | RESPIRATION RATE: 18 BRPM | HEART RATE: 100 BPM

## 2024-05-18 VITALS
OXYGEN SATURATION: 99 % | HEART RATE: 91 BPM | SYSTOLIC BLOOD PRESSURE: 111 MMHG | RESPIRATION RATE: 18 BRPM | DIASTOLIC BLOOD PRESSURE: 70 MMHG | TEMPERATURE: 98 F

## 2024-05-18 DIAGNOSIS — O99.891 OTHER SPECIFIED DISEASES AND CONDITIONS COMPLICATING PREGNANCY: ICD-10-CM

## 2024-05-18 DIAGNOSIS — J45.909 UNSPECIFIED ASTHMA, UNCOMPLICATED: ICD-10-CM

## 2024-05-18 DIAGNOSIS — R06.02 SHORTNESS OF BREATH: ICD-10-CM

## 2024-05-18 DIAGNOSIS — R05.9 COUGH, UNSPECIFIED: ICD-10-CM

## 2024-05-18 DIAGNOSIS — Z3A.27 27 WEEKS GESTATION OF PREGNANCY: ICD-10-CM

## 2024-05-18 DIAGNOSIS — O99.512 DISEASES OF THE RESPIRATORY SYSTEM COMPLICATING PREGNANCY, SECOND TRIMESTER: ICD-10-CM

## 2024-05-18 PROCEDURE — 94640 AIRWAY INHALATION TREATMENT: CPT

## 2024-05-18 PROCEDURE — 93010 ELECTROCARDIOGRAM REPORT: CPT

## 2024-05-18 PROCEDURE — 99285 EMERGENCY DEPT VISIT HI MDM: CPT

## 2024-05-18 PROCEDURE — 99283 EMERGENCY DEPT VISIT LOW MDM: CPT | Mod: 25

## 2024-05-18 PROCEDURE — 93005 ELECTROCARDIOGRAM TRACING: CPT

## 2024-05-18 RX ORDER — IPRATROPIUM/ALBUTEROL SULFATE 18-103MCG
3 AEROSOL WITH ADAPTER (GRAM) INHALATION ONCE
Refills: 0 | Status: COMPLETED | OUTPATIENT
Start: 2024-05-18 | End: 2024-05-18

## 2024-05-18 RX ORDER — DEXAMETHASONE 0.5 MG/5ML
10 ELIXIR ORAL ONCE
Refills: 0 | Status: COMPLETED | OUTPATIENT
Start: 2024-05-18 | End: 2024-05-18

## 2024-05-18 RX ADMIN — Medication 3 MILLILITER(S): at 16:26

## 2024-05-18 RX ADMIN — Medication 10 MILLIGRAM(S): at 16:28

## 2024-05-18 NOTE — ED ADULT TRIAGE NOTE - CHIEF COMPLAINT QUOTE
Pt 27 weeks pregnant, c/o asthma exacerbation, coughing up green mucous.   Pt states to have been wheezing this AM & took nebulizer Pt 27 weeks pregnant, c/o asthma exacerbation, coughing up green mucous. PT also expresses having some chest tightness  Pt states to have been wheezing this AM & took nebulizer Pt 27 weeks pregnant, c/o asthma exacerbation, coughing up green mucous. PT also expresses having some chest tightness  Pt states to have been wheezing this AM & took nebulizer  (OBGYN Dr Jalloh)

## 2024-05-18 NOTE — ED ADULT NURSE NOTE - CHIEF COMPLAINT QUOTE
Pt 27 weeks pregnant, c/o asthma exacerbation, coughing up green mucous. PT also expresses having some chest tightness  Pt states to have been wheezing this AM & took nebulizer  (OBGYN Dr Jalloh)

## 2024-05-18 NOTE — ED PROVIDER NOTE - CLINICAL SUMMARY MEDICAL DECISION MAKING FREE TEXT BOX
34 y/o F h/o asthma, 27 weeks pregnant p/w cough productive of green sputum, SOB, wheezing. Partner sick with similar sxs. HDS. Minimal wheezing on exam.     Treated with duonebs, steroids. Reassessed. Now improved. Likely viral syndrome triggering asthma exacebartion.     given good instructions when to return to ED and importance of f/u.  all incidental findings were discussed with pt as well. pt verbalized understanding. patient was given opportunity to ask questions.

## 2024-05-18 NOTE — ED PROVIDER NOTE - CARE PROVIDER_API CALL
Brandon Dalal  Internal Medicine  [address] [place] [address]  Phone: [phone]  Fax: [phone]  Established Patient  Follow Up Time:

## 2024-05-18 NOTE — ED PROVIDER NOTE - PATIENT PORTAL LINK FT
You can access the FollowMyHealth Patient Portal offered by [facility] by registering at the following website: [email or web address]. By joining [facility]’s FollowMyHealth portal, you will also be able to view your health information using other applications (apps) compatible with our system.

## 2024-05-18 NOTE — ED PROVIDER NOTE - OBJECTIVE STATEMENT
33-year-old female with past medical history of asthma, 27 weeks pregnant presents to the ED due to wheezing and increased shortness of breath since yesterday.  Patient states she has been having a cough, green sputum, wheezing, congestion, for the last 4 days.  Patient took albuterol nebulizer without improvement.  Patient denies any headache, blurred vision, abdominal pain, dysuria, hematuria, vaginal bleeding, vaginal discharge.

## 2024-05-18 NOTE — ED PROVIDER NOTE - NSFOLLOWUPINSTRUCTIONS_ED_ALL_ED_FT
Wheezing    WHAT YOU NEED TO KNOW:    What do I need to know about wheezing? Wheezing happens when air flows through a narrowed or blocked airway. Wheezing can happen when you breathe in, breathe out, or both. Wheezes may sound like a whistle, squeal, groan, or creak. Wheezes may also sound musical or high-pitched.    What causes or increases my risk for wheezing?    Asthma    Allergies    A respiratory or sinus infection    Extra mucus in sinuses or airways    Smoking, or exposure to secondhand smoke    Certain medical conditions such as chronic obstructive pulmonary disease (COPD)    A foreign body blocking your airway passages  What are the signs and symptoms of wheezing?    Noisy breathing    Shortness of breath    Chest tightness    Fast breathing or heart rate    Cough  How is wheezing diagnosed?    An x-ray or CT of your chest may show the cause of the wheezing. You may be given contrast liquid to help the lungs show up better in the pictures. Tell your healthcare provider if you have ever had an allergic reaction to contrast liquid.    Blood tests may show infection, blood oxygen levels, and antibody levels.    Breathing tests may show how your airways are working. Pulmonary function tests (spirometry) or peak flow can show if your airways are narrowed or blocked.    A sputum sample may show if your wheezing is caused by a bacterial infection.  How is wheezing treated?    Medicines may help open your airways, decrease your symptoms, or treat an infection. They may be given as an inhaler, nebulizer, or pill.    You may need extra oxygen if your blood oxygen level is lower than it should be. You may get oxygen through a mask placed over your nose and mouth or through small tubes placed in your nostrils. Ask your healthcare provider before you take off the mask or oxygen tubing.  How can I manage my symptoms?    Return to your usual activity as directed. You may need to limit certain activities. Ask your healthcare provider when it is okay to resume activity.    Take deep breaths and cough several times a day. This will decrease your risk for a lung infection and help decrease wheezing. Take a deep breath and hold it for as long as you can. Let the air out and then cough strongly. Deep breaths help open your airway. You may be given an incentive spirometer to help you take deep breaths. Put the plastic piece in your mouth and take a slow, deep breath in, then let the air out and cough. Repeat these steps 10 times every hour.    Drink liquids as directed. You may need to drink more liquids than usual to thin your mucus and prevent dehydration. Ask how much liquid to drink each day and which liquids are best for you.  How can I help prevent wheezing?    Do not smoke. Nicotine and other chemicals in cigarettes and cigars can cause lung damage. Ask your healthcare provider for information if you currently smoke and need help to quit. E-cigarettes or smokeless tobacco still contain nicotine. Talk to your healthcare provider before you use these products.    Avoid allergy triggers, such as animals, grass, pollen, or dust.  Call your local emergency number (911 in the [place]) if:    You feel lightheaded, short of breath, and have chest pain.    You are dizzy, confused, or feel faint.    You have sudden trouble breathing.    Your throat feels like it is swelling or feels tight.  When should I seek immediate care?    You cough up blood.    When should I call my doctor?    You have a fever.    Your wheezing does not get better or it gets worse.    You have questions or concerns about your condition or care.

## 2024-05-18 NOTE — ED PROVIDER NOTE - PHYSICAL EXAMINATION
VITAL SIGNS: I have reviewed nursing notes and confirm.  CONSTITUTIONAL: well-appearing, non-toxic, NAD  SKIN: Warm dry, normal skin turgor  HEAD: NCAT  ENT: Moist mucous membranes, TM visualized and normal, erythema to throat, no white patches or exudates.  CARD: RRR, no murmurs, rubs or gallops  RESP: clear to ausculation b/l.  No rales, rhonchi, or wheezing.  ABD: Gravid  EXT: Full ROM, Mild +1 edema bilateral lower extremities.  NEURO: normal motor. normal sensory.   PSYCH: Cooperative, appropriate. VITAL SIGNS: I have reviewed nursing notes and confirm.  CONSTITUTIONAL: well-appearing, non-toxic, NAD  SKIN: Warm dry, normal skin turgor  HEAD: NCAT  ENT: Moist mucous membranes, TM visualized and normal, erythema to throat, no white patches or exudates.  CARD: RRR, no murmurs, rubs or gallops  RESP: clear to ausculation b/l.  No rales, rhonchi, or wheezing.  ABD: Gravid  EXT: Full ROM, trace pedal edema  NEURO: normal motor. normal sensory.   PSYCH: Cooperative, appropriate.

## 2024-05-22 ENCOUNTER — OUTPATIENT (OUTPATIENT)
Dept: INPATIENT UNIT | Facility: HOSPITAL | Age: 34
LOS: 1 days | Discharge: HOME | End: 2024-05-22
Payer: MEDICAID

## 2024-05-22 VITALS
HEART RATE: 93 BPM | SYSTOLIC BLOOD PRESSURE: 129 MMHG | RESPIRATION RATE: 18 BRPM | DIASTOLIC BLOOD PRESSURE: 59 MMHG | TEMPERATURE: 99 F

## 2024-05-22 DIAGNOSIS — O26.899 OTHER SPECIFIED PREGNANCY RELATED CONDITIONS, UNSPECIFIED TRIMESTER: ICD-10-CM

## 2024-05-22 PROCEDURE — 99223 1ST HOSP IP/OBS HIGH 75: CPT

## 2024-05-22 PROCEDURE — 99418 PROLNG IP/OBS E/M EA 15 MIN: CPT

## 2024-05-22 PROCEDURE — 99214 OFFICE O/P EST MOD 30 MIN: CPT

## 2024-05-22 NOTE — OB PROVIDER TRIAGE NOTE - NSOBPROVIDERNOTE_OBGYN_ALL_OB_FT
32yo  at 28w0d, with decreased fetal movement and recent asthma exacerbation, fetal movement resolved, with polyhydramnios, lungs clear at this time, BPP 10/10, reassuring maternal and fetal status.   - Start claritin daily   - F/u with MFM to adjust asthma medication outpatient   - D/c home with PTL precautions, FKC  - Encourage PO hydration  - Continue insulin regimen per MFM  - Continue valtrex    D/w Dr. Chang and Dr. Aguilar

## 2024-05-22 NOTE — OB PROVIDER TRIAGE NOTE - HISTORY OF PRESENT ILLNESS
33 yr old  at 28w0d, FORREST 24 by LMP c/w first tri sono, presents to labor and delivery for decreased fetal movement. Pt reports no fetal movement for last 3 days. Denies contractions, vaginal bleeding, leakage of fluid. Pt reports recent URI that triggered an asthma exacerbation, came to ED on , was given steroids and albuterol nebulizer and sent home. Pt reports that since then she has been experiencing more wheezing than usual, using rescue inhaler 2-3 times per day. Denies headaches, nausea, vomiting, diarrhea. Denies fever, chills, chest pain, RUQ/epigastric pain. Denies dysuria, hematuria, abnormal discharge, flank pain.    Pregnancy complications:  #Asthma  - On Symbicort and singulair qd   - Usually uses 1-2 days a week, 2-3x per day since Saturday    - Never hospitalized or intubated    #T2DM, poorly controlled   - Reports FS in 120-30s, 2 hr PP in 150s-60s    - Insulin regimen increased on :         - new regimen: Glargine 24U AM/ 28U PM, lispro , metformin d/suleman on              - hordeolum in left eye- following with ophthalmologist        - s/p Cardiologist consult- EKG NSR- still needs f/u        - normal fetal echo        - has appt with podiatry  - Was in house for sugar control in     #HSV (+)   - on valtrex 1 gm daily  - reports last genital outbreak was March    # Social Issues   - Works at [employer] with odd hours making it difficult to take FS and eat diabetic diet   - Lack of social support; FOB lives with another woman. States she only has one friend, but is willing to help her.    - Adopted; adopted mother passed 2 mo ago (liver cancer), adopted father passed years ago (diabetes), biological mother struggles with addiction.    - Lives alone with 2 pets.   - Declined SW when offered in [facility] clinic     #Obesity  - BMI 41 On ASA 81 mg    #Rh NEG  - Received rhogam 24 due to bleeding    #Rosacea in pregnancy

## 2024-05-22 NOTE — OB RN TRIAGE NOTE - NSICDXPASTMEDICALHX_GEN_ALL_CORE_FT
PAST MEDICAL HISTORY:  Asthma     Diabetes mellitus, type 2     Eczema     PCOS (polycystic ovarian syndrome)     Rh negative status during pregnancy     Rosacea

## 2024-05-22 NOTE — OB RN TRIAGE NOTE - NS_MODEOFARRIVAL_OBGYN_ALL_OB
Avinash is a 1 year old boy with history of RAD who presents from OSH after 2 days of altered mental status, decreased PO intake and muscle spasm/unresponsive episodes concerning for encephalitis, cause unclear. CSF cultures and Viral encephalitis studies negative. Autoimmune encephalitis panel pending. Interval improvement in neurologic status and strength. Intermittent agitation, improved with Ativan PRN and Risperdal nightly. Accepted for transfer to inpatient rehab, pending insurance acceptance.    AMS likely 2/2 Encephalitis, unclear cause. Slowly improving mental status.  - s/p IVIG x 2  - s/p high dose Methylpred x 5 days  - MRI brain 6/14 showing almost complete resolution  - MRI spine 6/14 wnl  - VEEG 6/14-15 showing improving encephalitis  - LP 6/10 improved from previous  - CSF viral studies: HSV, Enterovirus, [place], Arbovirus, Viral encephalitis Ab panel neg  - CSF and blood cultures no growth final. Ceftriaxone and vancomycin dc'd.  - Serum and CSF Autoimmune encephalopathy panel pending  - Continuous pulse ox/tele. Neuro checks q4h  - Tylenol/ Ibuprofen PRN  - Neuro and ID following  - Tremors noted 6/20 and 6/21. Will continue to monitor and discuss with Neurology if warranted. Multiple EEG's previously normal   - Resend serum encephalopathy panel today    Deconditioning and agitation  - PT/OT following  - Child life following  - Ativan 0.05 mg/kg q4h PRN agitation  - Risperdal 0.5 mg nightly    Constipation  - Miralax daily  - Glycerine suppository PRN    Poor Weight Gain  - Nutrition following. Nutren Mj feeding volume increased to 1120 ml/day.  - Follow weights biweekly (M/Th)    Hyperkalemia  - K 6.3 today, no hemolysis noted. Stat BMP sent.    Diet: NGT bolus feeds goal 280 ml x 4.  Social: Sitter at bedside. Mom to be updated by phone  Access: PIV  Dispo: [facility] pending [facility] acceptance.              In DCFS custody as of 6/21.   Eva Dhaliwal ([phone]). For consent call foster care worker Jay Peters ([phone]).   Car

## 2024-05-22 NOTE — OB PROVIDER TRIAGE NOTE - NS_OBGYNHISTORY_OBGYN_ALL_OB_FT
OB Hx: , Missed AB with D&C, ectopic s/p MTX in     Gyn Hx:   - h/o PCOS  - HSV= on valtrex   - denies fibroids, abnormal paps, other STIs

## 2024-05-22 NOTE — OB PROVIDER TRIAGE NOTE - ATTENDING COMMENTS
I was physically present for the key portions of the evaluation and management (e/m) service provided. I agree with the above history, physical and plan which I have reviewed and edited where appropriate  Patient seen face to face and case reviewed, precautions given to patient    Patient presented to triage-I started my evaluation at . The fetal heart rate monitor ended . I spent 86 minutes caring for the patient. It included obtaining a history, performing of multiple examinations, continuously monitoring the fetus and interpretation of the fetal heart rate strip, ordering and reviewing labs-serial sets , documenting in the medical record and multiple discussions with the patient    34 y/o  @ 28 wks who presented with decreased fetal movement, now resolved. Reassuring maternal and fetal status. Follow up with MFM for asthma and for scheduled prenatal visit.

## 2024-05-22 NOTE — OB RN TRIAGE NOTE - FALL HARM RISK - UNIVERSAL INTERVENTIONS
Bed in lowest position, wheels locked, appropriate side rails in place/Call bell, personal items and telephone in reach/Instruct patient to call for assistance before getting out of bed or chair/Non-slip footwear when patient is out of bed/[place] to call system/Physically safe environment - no spills, clutter or unnecessary equipment/Purposeful Proactive Rounding/Room/bathroom lighting operational, light cord in reach
n/a

## 2024-05-22 NOTE — OB PROVIDER TRIAGE NOTE - NSHPLABSRESULTS_GEN_ALL_CORE
12/28 A1c 9.1, O neg, antibody neg, Hep B NR, Hep C NR, Rubella: equivocal, Frag X: neg, SMA: neg, TB Quant: Neg,CF neg, NIPTs LR  3/1 A1C 6.6%  3/2 TSH 1.87, T4 9, 24hr     OBUS:  1/25: S=D based on 12/28 sono  2/7: Normal NT screen  3/1: 16w2d, 186g anatomy wnl  4/1: 20w5d, LGA 98% (AC 94%) anatomy wnl but incomplete eval of heart

## 2024-05-23 LAB
BILIRUB UR QL STRIP: NORMAL
CLARITY UR: CLEAR
COLLECTION METHOD: NORMAL
GLUCOSE BLDC GLUCOMTR-MCNC: 124
GLUCOSE UR-MCNC: 500
HCG UR QL: 0.2 EU/DL
HGB UR QL STRIP.AUTO: NORMAL
KETONES UR-MCNC: NORMAL
LEUKOCYTE ESTERASE UR QL STRIP: NORMAL
NITRITE UR QL STRIP: NORMAL
PH UR STRIP: 7
PROT UR STRIP-MCNC: NORMAL
SP GR UR STRIP: 1.02

## 2024-05-24 DIAGNOSIS — E11.9 TYPE 2 DIABETES MELLITUS WITHOUT COMPLICATIONS: ICD-10-CM

## 2024-05-24 DIAGNOSIS — E28.2 POLYCYSTIC OVARIAN SYNDROME: ICD-10-CM

## 2024-05-24 DIAGNOSIS — O09.13 SUPERVISION OF PREGNANCY WITH HISTORY OF ECTOPIC PREGNANCY, THIRD TRIMESTER: ICD-10-CM

## 2024-05-24 DIAGNOSIS — O36.8130 DECREASED FETAL MOVEMENTS, THIRD TRIMESTER, NOT APPLICABLE OR UNSPECIFIED: ICD-10-CM

## 2024-05-24 DIAGNOSIS — Z3A.28 28 WEEKS GESTATION OF PREGNANCY: ICD-10-CM

## 2024-05-24 DIAGNOSIS — O99.213 OBESITY COMPLICATING PREGNANCY, THIRD TRIMESTER: ICD-10-CM

## 2024-05-24 DIAGNOSIS — O24.113 PRE-EXISTING TYPE 2 DIABETES MELLITUS, IN PREGNANCY, THIRD TRIMESTER: ICD-10-CM

## 2024-05-24 DIAGNOSIS — Z79.4 LONG TERM (CURRENT) USE OF INSULIN: ICD-10-CM

## 2024-05-24 DIAGNOSIS — O99.283 ENDOCRINE, NUTRITIONAL AND METABOLIC DISEASES COMPLICATING PREGNANCY, THIRD TRIMESTER: ICD-10-CM

## 2024-05-24 DIAGNOSIS — O09.523 SUPERVISION OF ELDERLY MULTIGRAVIDA, THIRD TRIMESTER: ICD-10-CM

## 2024-05-24 DIAGNOSIS — O40.3XX0 POLYHYDRAMNIOS, THIRD TRIMESTER, NOT APPLICABLE OR UNSPECIFIED: ICD-10-CM

## 2024-05-24 DIAGNOSIS — J45.901 UNSPECIFIED ASTHMA WITH (ACUTE) EXACERBATION: ICD-10-CM

## 2024-05-24 DIAGNOSIS — E66.9 OBESITY, UNSPECIFIED: ICD-10-CM

## 2024-05-24 DIAGNOSIS — O99.513 DISEASES OF THE RESPIRATORY SYSTEM COMPLICATING PREGNANCY, THIRD TRIMESTER: ICD-10-CM

## 2024-05-26 ENCOUNTER — EMERGENCY (EMERGENCY)
Facility: HOSPITAL | Age: 34
LOS: 0 days | Discharge: HOME | End: 2024-05-27
Attending: STUDENT IN AN ORGANIZED HEALTH CARE EDUCATION/TRAINING PROGRAM
Payer: MEDICAID

## 2024-05-26 ENCOUNTER — NON-APPOINTMENT (OUTPATIENT)
Age: 34
End: 2024-05-26

## 2024-05-26 VITALS
RESPIRATION RATE: 18 BRPM | DIASTOLIC BLOOD PRESSURE: 58 MMHG | WEIGHT: 250 LBS | TEMPERATURE: 100 F | OXYGEN SATURATION: 98 % | HEART RATE: 120 BPM | SYSTOLIC BLOOD PRESSURE: 123 MMHG | HEIGHT: 65 IN

## 2024-05-26 DIAGNOSIS — O99.891 OTHER SPECIFIED DISEASES AND CONDITIONS COMPLICATING PREGNANCY: ICD-10-CM

## 2024-05-26 DIAGNOSIS — R05.9 COUGH, UNSPECIFIED: ICD-10-CM

## 2024-05-26 DIAGNOSIS — R06.02 SHORTNESS OF BREATH: ICD-10-CM

## 2024-05-26 DIAGNOSIS — Z3A.28 28 WEEKS GESTATION OF PREGNANCY: ICD-10-CM

## 2024-05-26 DIAGNOSIS — O36.8130 DECREASED FETAL MOVEMENTS, THIRD TRIMESTER, NOT APPLICABLE OR UNSPECIFIED: ICD-10-CM

## 2024-05-26 DIAGNOSIS — Z20.822 CONTACT WITH AND (SUSPECTED) EXPOSURE TO COVID-19: ICD-10-CM

## 2024-05-26 DIAGNOSIS — O24.313 UNSPECIFIED PRE-EXISTING DIABETES MELLITUS IN PREGNANCY, THIRD TRIMESTER: ICD-10-CM

## 2024-05-26 LAB
ALBUMIN SERPL ELPH-MCNC: 3.5 G/DL — SIGNIFICANT CHANGE UP (ref 3.5–5.2)
ALP SERPL-CCNC: 123 U/L — HIGH (ref 30–115)
ALT FLD-CCNC: 78 U/L — HIGH (ref 0–41)
ANION GAP SERPL CALC-SCNC: 11 MMOL/L — SIGNIFICANT CHANGE UP (ref 7–14)
APTT BLD: 28.6 SEC — SIGNIFICANT CHANGE UP (ref 27–39.2)
AST SERPL-CCNC: 38 U/L — SIGNIFICANT CHANGE UP (ref 0–41)
BASOPHILS # BLD AUTO: 0.02 K/UL — SIGNIFICANT CHANGE UP (ref 0–0.2)
BASOPHILS NFR BLD AUTO: 0.2 % — SIGNIFICANT CHANGE UP (ref 0–1)
BILIRUB SERPL-MCNC: <0.2 MG/DL — SIGNIFICANT CHANGE UP (ref 0.2–1.2)
BUN SERPL-MCNC: 13 MG/DL — SIGNIFICANT CHANGE UP (ref 10–20)
CALCIUM SERPL-MCNC: 9.4 MG/DL — SIGNIFICANT CHANGE UP (ref 8.4–10.4)
CHLORIDE SERPL-SCNC: 104 MMOL/L — SIGNIFICANT CHANGE UP (ref 98–110)
CO2 SERPL-SCNC: 22 MMOL/L — SIGNIFICANT CHANGE UP (ref 17–32)
CREAT SERPL-MCNC: 0.6 MG/DL — LOW (ref 0.7–1.5)
D DIMER BLD IA.RAPID-MCNC: 318 NG/ML DDU — HIGH
EGFR: 120 ML/MIN/1.73M2 — SIGNIFICANT CHANGE UP
EOSINOPHIL # BLD AUTO: 0.07 K/UL — SIGNIFICANT CHANGE UP (ref 0–0.7)
EOSINOPHIL NFR BLD AUTO: 0.6 % — SIGNIFICANT CHANGE UP (ref 0–8)
FLUAV AG NPH QL: SIGNIFICANT CHANGE UP
FLUBV AG NPH QL: SIGNIFICANT CHANGE UP
GLUCOSE SERPL-MCNC: 169 MG/DL — HIGH (ref 70–99)
HCT VFR BLD CALC: 38.9 % — SIGNIFICANT CHANGE UP (ref 37–47)
HGB BLD-MCNC: 12.6 G/DL — SIGNIFICANT CHANGE UP (ref 12–16)
IMM GRANULOCYTES NFR BLD AUTO: 0.6 % — HIGH (ref 0.1–0.3)
INR BLD: 0.89 RATIO — SIGNIFICANT CHANGE UP (ref 0.65–1.3)
LYMPHOCYTES # BLD AUTO: 1.28 K/UL — SIGNIFICANT CHANGE UP (ref 1.2–3.4)
LYMPHOCYTES # BLD AUTO: 11.8 % — LOW (ref 20.5–51.1)
MCHC RBC-ENTMCNC: 26.5 PG — LOW (ref 27–31)
MCHC RBC-ENTMCNC: 32.4 G/DL — SIGNIFICANT CHANGE UP (ref 32–37)
MCV RBC AUTO: 81.9 FL — SIGNIFICANT CHANGE UP (ref 81–99)
MONOCYTES # BLD AUTO: 0.64 K/UL — HIGH (ref 0.1–0.6)
MONOCYTES NFR BLD AUTO: 5.9 % — SIGNIFICANT CHANGE UP (ref 1.7–9.3)
NEUTROPHILS # BLD AUTO: 8.74 K/UL — HIGH (ref 1.4–6.5)
NEUTROPHILS NFR BLD AUTO: 80.9 % — HIGH (ref 42.2–75.2)
NRBC # BLD: 0 /100 WBCS — SIGNIFICANT CHANGE UP (ref 0–0)
NT-PROBNP SERPL-SCNC: 58 PG/ML — SIGNIFICANT CHANGE UP (ref 0–300)
PLATELET # BLD AUTO: 240 K/UL — SIGNIFICANT CHANGE UP (ref 130–400)
PMV BLD: 9.9 FL — SIGNIFICANT CHANGE UP (ref 7.4–10.4)
POTASSIUM SERPL-MCNC: 3.8 MMOL/L — SIGNIFICANT CHANGE UP (ref 3.5–5)
POTASSIUM SERPL-SCNC: 3.8 MMOL/L — SIGNIFICANT CHANGE UP (ref 3.5–5)
PROT SERPL-MCNC: 6.4 G/DL — SIGNIFICANT CHANGE UP (ref 6–8)
PROTHROM AB SERPL-ACNC: 10.1 SEC — SIGNIFICANT CHANGE UP (ref 9.95–12.87)
RBC # BLD: 4.75 M/UL — SIGNIFICANT CHANGE UP (ref 4.2–5.4)
RBC # FLD: 14.5 % — SIGNIFICANT CHANGE UP (ref 11.5–14.5)
RSV RNA NPH QL NAA+NON-PROBE: SIGNIFICANT CHANGE UP
SARS-COV-2 RNA SPEC QL NAA+PROBE: SIGNIFICANT CHANGE UP
SODIUM SERPL-SCNC: 137 MMOL/L — SIGNIFICANT CHANGE UP (ref 135–146)
TROPONIN T, HIGH SENSITIVITY RESULT: <6 NG/L — SIGNIFICANT CHANGE UP (ref 6–13)
WBC # BLD: 10.81 K/UL — HIGH (ref 4.8–10.8)
WBC # FLD AUTO: 10.81 K/UL — HIGH (ref 4.8–10.8)

## 2024-05-26 PROCEDURE — 80053 COMPREHEN METABOLIC PANEL: CPT

## 2024-05-26 PROCEDURE — 85730 THROMBOPLASTIN TIME PARTIAL: CPT

## 2024-05-26 PROCEDURE — 71046 X-RAY EXAM CHEST 2 VIEWS: CPT | Mod: 26

## 2024-05-26 PROCEDURE — 93970 EXTREMITY STUDY: CPT | Mod: 26

## 2024-05-26 PROCEDURE — 0241U: CPT

## 2024-05-26 PROCEDURE — 93010 ELECTROCARDIOGRAM REPORT: CPT

## 2024-05-26 PROCEDURE — 99285 EMERGENCY DEPT VISIT HI MDM: CPT

## 2024-05-26 PROCEDURE — 93005 ELECTROCARDIOGRAM TRACING: CPT

## 2024-05-26 PROCEDURE — 83880 ASSAY OF NATRIURETIC PEPTIDE: CPT

## 2024-05-26 PROCEDURE — 93970 EXTREMITY STUDY: CPT

## 2024-05-26 PROCEDURE — 84484 ASSAY OF TROPONIN QUANT: CPT

## 2024-05-26 PROCEDURE — 94640 AIRWAY INHALATION TREATMENT: CPT

## 2024-05-26 PROCEDURE — 85379 FIBRIN DEGRADATION QUANT: CPT

## 2024-05-26 PROCEDURE — 71046 X-RAY EXAM CHEST 2 VIEWS: CPT

## 2024-05-26 PROCEDURE — 36415 COLL VENOUS BLD VENIPUNCTURE: CPT

## 2024-05-26 PROCEDURE — 85025 COMPLETE CBC W/AUTO DIFF WBC: CPT

## 2024-05-26 PROCEDURE — 85610 PROTHROMBIN TIME: CPT

## 2024-05-26 RX ORDER — SODIUM CHLORIDE 9 MG/ML
1000 INJECTION INTRAMUSCULAR; INTRAVENOUS; SUBCUTANEOUS ONCE
Refills: 0 | Status: COMPLETED | OUTPATIENT
Start: 2024-05-26 | End: 2024-05-26

## 2024-05-26 RX ORDER — IPRATROPIUM/ALBUTEROL SULFATE 18-103MCG
3 AEROSOL WITH ADAPTER (GRAM) INHALATION ONCE
Refills: 0 | Status: COMPLETED | OUTPATIENT
Start: 2024-05-26 | End: 2024-05-26

## 2024-05-26 RX ADMIN — SODIUM CHLORIDE 1000 MILLILITER(S): 9 INJECTION INTRAMUSCULAR; INTRAVENOUS; SUBCUTANEOUS at 23:17

## 2024-05-26 RX ADMIN — Medication 3 MILLILITER(S): at 21:44

## 2024-05-26 NOTE — ED PROVIDER NOTE - OBJECTIVE STATEMENT
33-year-old female past medical history 28 weeks pregnant G3, P0, diabetes presents for evaluation of shortness of breath.  Patient endorses shortness of breath x 1 week with associated productive cough.  Mild improvement with albuterol, no inciting factors.  Patient also endorses decreased fetal movement for the past couple days.  Denies headache, chest pain, abdominal pain, cramping, bleeding, dysuria, hematuria, vomiting, diarrhea.

## 2024-05-26 NOTE — ED ADULT NURSE NOTE - NSFALLUNIVINTERV_ED_ALL_ED
Bed/Stretcher in lowest position, wheels locked, appropriate side rails in place/Call bell, personal items and telephone in reach/Instruct patient to call for assistance before getting out of bed/chair/stretcher/Non-slip footwear applied when patient is off stretcher/[place] to call system/Physically safe environment - no spills, clutter or unnecessary equipment/Purposeful proactive rounding/Room/bathroom lighting operational, light cord in reach

## 2024-05-26 NOTE — ED PROVIDER NOTE - PHYSICAL EXAMINATION
CONST: NAD  EYES: Sclera and conjunctiva clear.   ENT: No nasal discharge. Oropharynx normal appearing, no erythema or exudates. No abscess or swelling. Uvula midline.   NECK: Non-tender, no meningeal signs. normal ROM. supple   CARD: Tachycardiac S1 S2; No jvd  RESP: Equal BS B/L, No wheezes, rhonchi or rales. No distress  GI: Soft, non-tender, non-distended. no cva tenderness. normal BS  MS: Normal ROM in all extremities. pulses 2 +. no calf tenderness or swelling  SKIN: Rosacea

## 2024-05-26 NOTE — ED ADULT NURSE NOTE - CAS TRG GENERAL AIRWAY, MLM
Chief Complaint   Patient presents with   • Office Visit   • Establish Care     Pt presents today to establish . Pt would like to discuss ct scan. Pt refused flu vaccination.         SUBJECTIVE:   HISTORY OF PRESENT ILLNESS:  Narayan Mendoza is a 34 year old female who presents today for establishing care.    Every morning when waking up having pain both sides. When urinating pain symptoms are resolved. This has been ongoing for about a year. Had colon surgery for recurrent diverticulitis. This surgery was 2 years ago in July.    Pain only in the mornings when awakening.    Chest pain last night. Lasted the entire night, substernal. Describes as pressure sensation. No history of acid reflux. No chest pain when exercising. No anxiety symptoms. Has had chest pain in the past which was diagnosed as panic attack.    PAST MEDICAL HISTORY:  Past Medical History:   Diagnosis Date   • Anxiety 3/9/2021   • Diverticulitis    • Diverticulosis of colon with hemorrhage    • Transfusion history     no reaction        PAST SURGICAL HISTORY:  Past Surgical History:   Procedure Laterality Date   • Abdomen surgery      benign mass removed abdomen   • Biopsy of breast, incisional Right 2008    benign   •  section, low transverse      x2   • Colon surgery      6 inch colon resection due to diverticulitis   • Hysterectomy      HMB, supracervical (C hyst)       FAMILY HISTORY:  Family History   Problem Relation Age of Onset   • Patient is unaware of any medical problems Mother    • Patient is unaware of any medical problems Father    • Cancer, Breast Neg Hx    • Cancer, Colon Neg Hx    • Cancer, Ovarian Neg Hx    • Clotting Disorder Neg Hx        SOCIAL HISTORY:  Social History     Tobacco Use   • Smoking status: Never   • Smokeless tobacco: Never   Vaping Use   • Vaping Use: never used   Substance Use Topics   • Alcohol use: Never   • Drug use: Never       ALLERGIES:  ALLERGIES:  No Known  Allergies    MEDICATIONS:  Current Outpatient Medications   Medication Sig Dispense Refill   • Vitamin D, Cholecalciferol, 50 mcg (2,000 units) capsule Take by mouth daily. 3000Iu     • Anucort-HC 25 MG suppository UNWRAP AND INSERT 1 SUPPOSITORY RECTALLY TWICE DAILY AS NEEDED FOR HEMORRHOIDS 28 suppository 0   • Probiotic Product (Probiotic Advanced) Cap Take 1 capsule by mouth daily.        No current facility-administered medications for this visit.       Review of Systems    OBJECTIVE:     Visit Vitals  BP 95/68   Pulse 82   Temp 96.9 °F (36.1 °C) (Tympanic)   Resp 16   Ht 5' 3\" (1.6 m)   Wt 81.8 kg (180 lb 5.4 oz)   LMP  (LMP Unknown)   SpO2 99%   BMI 31.95 kg/m²       Physical Exam  Vitals reviewed.   Constitutional:       Appearance: Normal appearance.   HENT:      Head: Normocephalic and atraumatic.   Eyes:      Conjunctiva/sclera: Conjunctivae normal.   Cardiovascular:      Rate and Rhythm: Normal rate and regular rhythm.      Pulses: Normal pulses.      Heart sounds: Normal heart sounds.   Pulmonary:      Effort: Pulmonary effort is normal.      Breath sounds: Normal breath sounds.   Abdominal:      General: Abdomen is flat.      Tenderness: There is no right CVA tenderness or left CVA tenderness.   Neurological:      Mental Status: She is alert.   Psychiatric:         Mood and Affect: Mood normal.         Behavior: Behavior normal.         Thought Content: Thought content normal.         Judgment: Judgment normal.         DIAGNOSTIC STUDIES:   LAB RESULTS:    No results found for this visit on 10/02/23.       Hemoglobin A1C (%)   Date Value   04/15/2023 5.1   04/04/2023 5.3   03/09/2021 5.2     Fasting Status   Date Value   06/29/2021 4 Hours   03/09/2021 0 Hours   11/15/2018 12 hrs     Glucose (mg/dL)   Date Value   04/04/2023 101 (H)   07/29/2021 102 (H)   07/28/2021 106 (H)     Sodium (mmol/L)   Date Value   04/04/2023 137   07/29/2021 142   07/28/2021 141     Potassium (mmol/L)   Date Value    04/04/2023 3.6   07/29/2021 3.7   07/28/2021 3.4     Chloride (mmol/L)   Date Value   04/04/2023 108   07/29/2021 112 (H)   07/28/2021 110 (H)     Carbon Dioxide (mmol/L)   Date Value   04/04/2023 26   07/29/2021 22   07/28/2021 25     Anion Gap (mmol/L)   Date Value   04/04/2023 7   07/29/2021 12   07/28/2021 9 (L)     BUN (mg/dL)   Date Value   04/04/2023 9   07/29/2021 7   07/28/2021 4 (L)     Creatinine (mg/dL)   Date Value   04/04/2023 0.54   07/29/2021 0.57   07/28/2021 0.59     GFR Estimate,  (no units)   Date Value   08/02/2020     eGFR results = or >90 mL/min/1.73m2 = Normal kidney function.   08/02/2020 >90   08/01/2020     eGFR results = or >90 mL/min/1.73m2 = Normal kidney function.   08/01/2020 >90     GFR Estimate, Non  (no units)   Date Value   08/02/2020 >90   08/02/2020     eGFR results = or >90 mL/min/1.73m2 = Normal kidney function.   08/01/2020 >90   08/01/2020     eGFR results = or >90 mL/min/1.73m2 = Normal kidney function.     Calcium (mg/dL)   Date Value   04/04/2023 8.5   07/29/2021 7.9 (L)   07/28/2021 8.0 (L)     Bilirubin, Total (mg/dL)   Date Value   04/04/2023 0.4   06/29/2021 0.7   03/09/2021 0.4     GOT/AST (Units/L)   Date Value   04/04/2023 16   06/29/2021 26   03/09/2021 16     GPT/ALT (Units/L)   Date Value   04/04/2023 23   06/29/2021 24   03/09/2021 25     Alkaline Phosphatase (Units/L)   Date Value   04/04/2023 99   06/29/2021 104   03/09/2021 109     Protein, Total (g/dL)   Date Value   04/04/2023 8.0   06/29/2021 8.1   03/09/2021 7.7     Albumin (g/dL)   Date Value   04/04/2023 3.5 (L)   06/29/2021 3.9   03/09/2021 3.6     Globulin (g/dL)   Date Value   04/04/2023 4.5 (H)   06/29/2021 4.2 (H)   03/09/2021 4.1 (H)     A/G Ratio (no units)   Date Value   04/04/2023 0.8 (L)   06/29/2021 0.9 (L)   03/09/2021 0.9 (L)         ASSESSMENT AND PLAN:     Problem List Items Addressed This Visit        Gastrointestinal and Abdominal    Diverticulosis of  colon with hemorrhage    Relevant Orders    SERVICE TO SURGERY COLORECTAL   Other Visit Diagnoses     Flank pain    -  Primary    Relevant Orders    Urinalysis & Reflex Microscopy    Urine, Bacterial Culture    XRAY LUMBAR SPINE 2-3V    ANAND Screen With Antibody And IFA Reflex    C Reactive Protein    Cyclic Citrullinated Peptide Antibody IgG & IgA    Rheumatoid Factor    Sedimentation Rate    Pain in joint, multiple sites        Relevant Orders    XR HIPS 2 VIEWS BILATERAL    XRAY PELVIS 1-2V    XRAY LUMBAR SPINE 2-3V    ANAND Screen With Antibody And IFA Reflex    C Reactive Protein    Cyclic Citrullinated Peptide Antibody IgG & IgA    Rheumatoid Factor    Sedimentation Rate    Chest pain, unspecified type        Relevant Orders    Electrocardiogram 12-Lead    SERVICE TO CARDIOLOGY        #Joint pain  - bilateral lower back pain that resolves with urination in the mornings  - Ct reviewed with patient, some arthritis in the low back and pelvis could be contributory  - some persisting hematuria as well without uterus  - will repeat urine testing UA and Ucx, as well as obtain XR low back, hips, pelvis to evaluate extent of arthritis  - autoimmune arthritis labs  - if persisting hematuria sterile would consider urology evaluation    #Chest pain  - EKG in office today NSR 90 BPM   - referral to cardiology  - consider anxiety versus gastric etiology    #Hx diverticulitis, diverticulosis  - referral to [facility] to discuss ongoing management    #Adnexal cyst  - unlikely contributory, advised patient to follow up with gyn    Return in about 4 weeks (around 10/30/2023), or if symptoms worsen or fail to improve.    Instructions provided as documented in the AVS.    The patient indicated understanding of the diagnosis, agreed with the plan of care and agreed to call or return with any new or worsening symptoms.    Jacob Draper, DO  10/2/2023    [employer]

## 2024-05-26 NOTE — ED PROVIDER NOTE - CLINICAL SUMMARY MEDICAL DECISION MAKING FREE TEXT BOX
Pt presented today for sob during pregnancy. Pt treated with Nebs. Patient's case discussed with OBGYN. Not recommended to perform PE study.    Independent interpretation of the Xray film(s) performed by: Jacky Guadalupe.     Interpretation: Chest XR negative - no acute infiltrate, no pneumothorax    Patient transferred to OBGYN.

## 2024-05-26 NOTE — ED ADULT NURSE NOTE - OBJECTIVE STATEMENT
pt c/o flu like symptoms x2 weeks. pt states shes been coughing up green mucus - states she is 28 weeks pregnant

## 2024-05-27 ENCOUNTER — INPATIENT (INPATIENT)
Facility: HOSPITAL | Age: 34
LOS: 8 days | Discharge: HOME | DRG: 566 | End: 2024-06-05
Attending: OBSTETRICS & GYNECOLOGY | Admitting: OBSTETRICS & GYNECOLOGY
Payer: MEDICAID

## 2024-05-27 VITALS — HEART RATE: 102 BPM | SYSTOLIC BLOOD PRESSURE: 127 MMHG | DIASTOLIC BLOOD PRESSURE: 68 MMHG

## 2024-05-27 DIAGNOSIS — O26.899 OTHER SPECIFIED PREGNANCY RELATED CONDITIONS, UNSPECIFIED TRIMESTER: ICD-10-CM

## 2024-05-27 DIAGNOSIS — O26.893 OTHER SPECIFIED PREGNANCY RELATED CONDITIONS, THIRD TRIMESTER: ICD-10-CM

## 2024-05-27 LAB
ALBUMIN SERPL ELPH-MCNC: 3.6 G/DL — SIGNIFICANT CHANGE UP (ref 3.5–5.2)
ALP SERPL-CCNC: 113 U/L — SIGNIFICANT CHANGE UP (ref 30–115)
ALT FLD-CCNC: 84 U/L — HIGH (ref 0–41)
ANION GAP SERPL CALC-SCNC: 14 MMOL/L — SIGNIFICANT CHANGE UP (ref 7–14)
AST SERPL-CCNC: 40 U/L — SIGNIFICANT CHANGE UP (ref 0–41)
BASE EXCESS BLDA CALC-SCNC: -4.4 MMOL/L — LOW (ref -2–3)
BASOPHILS # BLD AUTO: 0.03 K/UL — SIGNIFICANT CHANGE UP (ref 0–0.2)
BASOPHILS NFR BLD AUTO: 0.3 % — SIGNIFICANT CHANGE UP (ref 0–1)
BILIRUB SERPL-MCNC: 0.2 MG/DL — SIGNIFICANT CHANGE UP (ref 0.2–1.2)
BLD GP AB SCN SERPL QL: SIGNIFICANT CHANGE UP
BUN SERPL-MCNC: 13 MG/DL — SIGNIFICANT CHANGE UP (ref 10–20)
CALCIUM SERPL-MCNC: 8.9 MG/DL — SIGNIFICANT CHANGE UP (ref 8.4–10.5)
CHLORIDE SERPL-SCNC: 104 MMOL/L — SIGNIFICANT CHANGE UP (ref 98–110)
CO2 SERPL-SCNC: 18 MMOL/L — SIGNIFICANT CHANGE UP (ref 17–32)
CREAT SERPL-MCNC: <0.5 MG/DL — LOW (ref 0.7–1.5)
EGFR: 132 ML/MIN/1.73M2 — SIGNIFICANT CHANGE UP
EOSINOPHIL # BLD AUTO: 0.02 K/UL — SIGNIFICANT CHANGE UP (ref 0–0.7)
EOSINOPHIL NFR BLD AUTO: 0.2 % — SIGNIFICANT CHANGE UP (ref 0–8)
GAS PNL BLDA: SIGNIFICANT CHANGE UP
GLUCOSE BLDC GLUCOMTR-MCNC: 100 MG/DL — HIGH (ref 70–99)
GLUCOSE BLDC GLUCOMTR-MCNC: 102 MG/DL — HIGH (ref 70–99)
GLUCOSE BLDC GLUCOMTR-MCNC: 111 MG/DL — HIGH (ref 70–99)
GLUCOSE BLDC GLUCOMTR-MCNC: 121 MG/DL — HIGH (ref 70–99)
GLUCOSE BLDC GLUCOMTR-MCNC: 134 MG/DL — HIGH (ref 70–99)
GLUCOSE BLDC GLUCOMTR-MCNC: 136 MG/DL — HIGH (ref 70–99)
GLUCOSE SERPL-MCNC: 128 MG/DL — HIGH (ref 70–99)
HCO3 BLDA-SCNC: 19 MMOL/L — LOW (ref 21–28)
HCT VFR BLD CALC: 37.6 % — SIGNIFICANT CHANGE UP (ref 37–47)
HGB BLD-MCNC: 12.2 G/DL — SIGNIFICANT CHANGE UP (ref 12–16)
IMM GRANULOCYTES NFR BLD AUTO: 0.9 % — HIGH (ref 0.1–0.3)
LYMPHOCYTES # BLD AUTO: 0.89 K/UL — LOW (ref 1.2–3.4)
LYMPHOCYTES # BLD AUTO: 8.4 % — LOW (ref 20.5–51.1)
MCHC RBC-ENTMCNC: 26.5 PG — LOW (ref 27–31)
MCHC RBC-ENTMCNC: 32.4 G/DL — SIGNIFICANT CHANGE UP (ref 32–37)
MCV RBC AUTO: 81.6 FL — SIGNIFICANT CHANGE UP (ref 81–99)
MONOCYTES # BLD AUTO: 0.33 K/UL — SIGNIFICANT CHANGE UP (ref 0.1–0.6)
MONOCYTES NFR BLD AUTO: 3.1 % — SIGNIFICANT CHANGE UP (ref 1.7–9.3)
NEUTROPHILS # BLD AUTO: 9.18 K/UL — HIGH (ref 1.4–6.5)
NEUTROPHILS NFR BLD AUTO: 87.1 % — HIGH (ref 42.2–75.2)
NRBC # BLD: 0 /100 WBCS — SIGNIFICANT CHANGE UP (ref 0–0)
PCO2 BLDA: 29 MMHG — LOW (ref 32–45)
PH BLDA: 7.42 — SIGNIFICANT CHANGE UP (ref 7.35–7.45)
PLATELET # BLD AUTO: 225 K/UL — SIGNIFICANT CHANGE UP (ref 130–400)
PMV BLD: 9.9 FL — SIGNIFICANT CHANGE UP (ref 7.4–10.4)
PO2 BLDA: 166 MMHG — HIGH (ref 83–108)
POTASSIUM SERPL-MCNC: 4 MMOL/L — SIGNIFICANT CHANGE UP (ref 3.5–5)
POTASSIUM SERPL-SCNC: 4 MMOL/L — SIGNIFICANT CHANGE UP (ref 3.5–5)
PROT SERPL-MCNC: 6.2 G/DL — SIGNIFICANT CHANGE UP (ref 6–8)
RBC # BLD: 4.61 M/UL — SIGNIFICANT CHANGE UP (ref 4.2–5.4)
RBC # FLD: 14.5 % — SIGNIFICANT CHANGE UP (ref 11.5–14.5)
SAO2 % BLDA: 99.6 % — HIGH (ref 94–98)
SODIUM SERPL-SCNC: 136 MMOL/L — SIGNIFICANT CHANGE UP (ref 135–146)
WBC # BLD: 10.54 K/UL — SIGNIFICANT CHANGE UP (ref 4.8–10.8)
WBC # FLD AUTO: 10.54 K/UL — SIGNIFICANT CHANGE UP (ref 4.8–10.8)

## 2024-05-27 PROCEDURE — 80053 COMPREHEN METABOLIC PANEL: CPT

## 2024-05-27 PROCEDURE — 0225U NFCT DS DNA&RNA 21 SARSCOV2: CPT

## 2024-05-27 PROCEDURE — 82962 GLUCOSE BLOOD TEST: CPT

## 2024-05-27 PROCEDURE — 86900 BLOOD TYPING SEROLOGIC ABO: CPT

## 2024-05-27 PROCEDURE — 94640 AIRWAY INHALATION TREATMENT: CPT

## 2024-05-27 PROCEDURE — 85730 THROMBOPLASTIN TIME PARTIAL: CPT

## 2024-05-27 PROCEDURE — 86735 MUMPS ANTIBODY: CPT

## 2024-05-27 PROCEDURE — 99418 PROLNG IP/OBS E/M EA 15 MIN: CPT

## 2024-05-27 PROCEDURE — 86765 RUBEOLA ANTIBODY: CPT

## 2024-05-27 PROCEDURE — 76818 FETAL BIOPHYS PROFILE W/NST: CPT | Mod: 26

## 2024-05-27 PROCEDURE — 86901 BLOOD TYPING SEROLOGIC RH(D): CPT

## 2024-05-27 PROCEDURE — 86762 RUBELLA ANTIBODY: CPT

## 2024-05-27 PROCEDURE — 85025 COMPLETE CBC W/AUTO DIFF WBC: CPT

## 2024-05-27 PROCEDURE — 71045 X-RAY EXAM CHEST 1 VIEW: CPT

## 2024-05-27 PROCEDURE — 86850 RBC ANTIBODY SCREEN: CPT

## 2024-05-27 PROCEDURE — 85610 PROTHROMBIN TIME: CPT

## 2024-05-27 PROCEDURE — 80074 ACUTE HEPATITIS PANEL: CPT

## 2024-05-27 PROCEDURE — 99223 1ST HOSP IP/OBS HIGH 75: CPT | Mod: 25

## 2024-05-27 PROCEDURE — 76705 ECHO EXAM OF ABDOMEN: CPT

## 2024-05-27 PROCEDURE — 36415 COLL VENOUS BLD VENIPUNCTURE: CPT

## 2024-05-27 RX ORDER — SODIUM CHLORIDE 9 MG/ML
1000 INJECTION, SOLUTION INTRAVENOUS
Refills: 0 | Status: DISCONTINUED | OUTPATIENT
Start: 2024-05-27 | End: 2024-05-27

## 2024-05-27 RX ORDER — DEXTROSE 50 % IN WATER 50 %
15 SYRINGE (ML) INTRAVENOUS ONCE
Refills: 0 | Status: DISCONTINUED | OUTPATIENT
Start: 2024-05-27 | End: 2024-06-05

## 2024-05-27 RX ORDER — INSULIN GLARGINE 100 [IU]/ML
24 INJECTION, SOLUTION SUBCUTANEOUS EVERY MORNING
Refills: 0 | Status: DISCONTINUED | OUTPATIENT
Start: 2024-05-27 | End: 2024-05-29

## 2024-05-27 RX ORDER — INSULIN GLARGINE 100 [IU]/ML
28 INJECTION, SOLUTION SUBCUTANEOUS AT BEDTIME
Refills: 0 | Status: DISCONTINUED | OUTPATIENT
Start: 2024-05-27 | End: 2024-05-29

## 2024-05-27 RX ORDER — DEXTROSE 50 % IN WATER 50 %
25 SYRINGE (ML) INTRAVENOUS ONCE
Refills: 0 | Status: DISCONTINUED | OUTPATIENT
Start: 2024-05-27 | End: 2024-06-05

## 2024-05-27 RX ORDER — INSULIN LISPRO 100/ML
9 VIAL (ML) SUBCUTANEOUS
Refills: 0 | Status: DISCONTINUED | OUTPATIENT
Start: 2024-05-27 | End: 2024-06-01

## 2024-05-27 RX ORDER — DEXTROSE 50 % IN WATER 50 %
12.5 SYRINGE (ML) INTRAVENOUS ONCE
Refills: 0 | Status: DISCONTINUED | OUTPATIENT
Start: 2024-05-27 | End: 2024-06-05

## 2024-05-27 RX ORDER — INSULIN LISPRO 100/ML
7 VIAL (ML) SUBCUTANEOUS
Refills: 0 | Status: DISCONTINUED | OUTPATIENT
Start: 2024-05-27 | End: 2024-06-01

## 2024-05-27 RX ORDER — ALBUTEROL 90 UG/1
2 AEROSOL, METERED ORAL EVERY 6 HOURS
Refills: 0 | Status: DISCONTINUED | OUTPATIENT
Start: 2024-05-27 | End: 2024-06-05

## 2024-05-27 RX ORDER — ASPIRIN/CALCIUM CARB/MAGNESIUM 324 MG
81 TABLET ORAL DAILY
Refills: 0 | Status: DISCONTINUED | OUTPATIENT
Start: 2024-05-27 | End: 2024-06-05

## 2024-05-27 RX ORDER — SODIUM CHLORIDE 9 MG/ML
1000 INJECTION, SOLUTION INTRAVENOUS
Refills: 0 | Status: DISCONTINUED | OUTPATIENT
Start: 2024-05-27 | End: 2024-06-05

## 2024-05-27 RX ORDER — GLUCAGON INJECTION, SOLUTION 0.5 MG/.1ML
1 INJECTION, SOLUTION SUBCUTANEOUS ONCE
Refills: 0 | Status: DISCONTINUED | OUTPATIENT
Start: 2024-05-27 | End: 2024-06-05

## 2024-05-27 RX ORDER — PANTOPRAZOLE SODIUM 20 MG/1
40 TABLET, DELAYED RELEASE ORAL
Refills: 0 | Status: DISCONTINUED | OUTPATIENT
Start: 2024-05-28 | End: 2024-05-30

## 2024-05-27 RX ORDER — IPRATROPIUM/ALBUTEROL SULFATE 18-103MCG
3 AEROSOL WITH ADAPTER (GRAM) INHALATION EVERY 6 HOURS
Refills: 0 | Status: DISCONTINUED | OUTPATIENT
Start: 2024-05-27 | End: 2024-06-05

## 2024-05-27 RX ORDER — MONTELUKAST 4 MG/1
10 TABLET, CHEWABLE ORAL DAILY
Refills: 0 | Status: DISCONTINUED | OUTPATIENT
Start: 2024-05-27 | End: 2024-06-05

## 2024-05-27 RX ORDER — DEXTROSE 10 % IN WATER 10 %
125 INTRAVENOUS SOLUTION INTRAVENOUS ONCE
Refills: 0 | Status: DISCONTINUED | OUTPATIENT
Start: 2024-05-27 | End: 2024-06-05

## 2024-05-27 RX ORDER — VALACYCLOVIR 500 MG/1
1000 TABLET, FILM COATED ORAL DAILY
Refills: 0 | Status: DISCONTINUED | OUTPATIENT
Start: 2024-05-27 | End: 2024-05-30

## 2024-05-27 RX ORDER — BUDESONIDE AND FORMOTEROL FUMARATE DIHYDRATE 160; 4.5 UG/1; UG/1
2 AEROSOL RESPIRATORY (INHALATION)
Refills: 0 | Status: DISCONTINUED | OUTPATIENT
Start: 2024-05-27 | End: 2024-06-05

## 2024-05-27 RX ORDER — LORATADINE 10 MG/1
10 TABLET ORAL DAILY
Refills: 0 | Status: DISCONTINUED | OUTPATIENT
Start: 2024-05-27 | End: 2024-06-05

## 2024-05-27 RX ADMIN — INSULIN GLARGINE 24 UNIT(S): 100 INJECTION, SOLUTION SUBCUTANEOUS at 07:54

## 2024-05-27 RX ADMIN — MONTELUKAST 10 MILLIGRAM(S): 4 TABLET, CHEWABLE ORAL at 12:44

## 2024-05-27 RX ADMIN — Medication 3 MILLILITER(S): at 01:37

## 2024-05-27 RX ADMIN — LORATADINE 10 MILLIGRAM(S): 10 TABLET ORAL at 12:43

## 2024-05-27 RX ADMIN — Medication 7 UNIT(S): at 12:44

## 2024-05-27 RX ADMIN — BUDESONIDE AND FORMOTEROL FUMARATE DIHYDRATE 2 PUFF(S): 160; 4.5 AEROSOL RESPIRATORY (INHALATION) at 12:43

## 2024-05-27 RX ADMIN — Medication 81 MILLIGRAM(S): at 12:43

## 2024-05-27 RX ADMIN — BUDESONIDE AND FORMOTEROL FUMARATE DIHYDRATE 2 PUFF(S): 160; 4.5 AEROSOL RESPIRATORY (INHALATION) at 19:58

## 2024-05-27 RX ADMIN — Medication 1 TABLET(S): at 12:43

## 2024-05-27 RX ADMIN — Medication 7 UNIT(S): at 07:54

## 2024-05-27 RX ADMIN — Medication 3 MILLILITER(S): at 14:08

## 2024-05-27 RX ADMIN — INSULIN GLARGINE 28 UNIT(S): 100 INJECTION, SOLUTION SUBCUTANEOUS at 22:08

## 2024-05-27 RX ADMIN — Medication 3 MILLILITER(S): at 07:48

## 2024-05-27 RX ADMIN — Medication 9 UNIT(S): at 17:30

## 2024-05-27 RX ADMIN — VALACYCLOVIR 1000 MILLIGRAM(S): 500 TABLET, FILM COATED ORAL at 12:43

## 2024-05-27 RX ADMIN — Medication 60 MILLIGRAM(S): at 02:10

## 2024-05-27 RX ADMIN — SODIUM CHLORIDE 125 MILLILITER(S): 9 INJECTION, SOLUTION INTRAVENOUS at 03:00

## 2024-05-27 RX ADMIN — Medication 3 MILLILITER(S): at 19:14

## 2024-05-27 NOTE — OB RN TRIAGE NOTE - NS_NPO_OBGYN_ALL_OB_DT
Referred from Nursing home for a \"simon\" colored vaginal discharge. uNABLE TO GIVE MUCH MORE HISTORY. Pelvic: Vulva:Normal, no lesions   Vagina:Completely clear, no discharge seen tod   Cx:I see no lesions   Ut: Mid small, non-tender   Angel:I feel no masses today. IMP: ? Possible discharge/    Plan:  Transvaginal sonogram the see back it review in 3 weeks.
26-May-2024 20:00

## 2024-05-27 NOTE — CONSULT NOTE ADULT - ASSESSMENT
[facility] Staff    I saw and evaluated Ms. TAYLOR JAY  with  *** and I agree with the documentation above.  Ms. TAYLOR JAY reports positive fetal movement and no vaginal bleeding.    General:  Ms. TAYLOR JAY is lying in bed.  She appears comfortable.    Vital Signs Last 24 Hrs  T(C): 36.8 (27 May 2024 15:00), Max: 37.6 (26 May 2024 20:50)  T(F): 98.3 (27 May 2024 15:00), Max: 99.6 (26 May 2024 20:50)  HR: 106 (27 May 2024 15:00) (90 - 120)  BP: 114/73 (27 May 2024 15:00) (112/56 - 129/61)  BP(mean): --  RR: 18 (27 May 2024 15:00) (18 - 18)  SpO2: 97% (27 May 2024 07:56) (93% - 98%)    Cardiovascular:  Normal S1 S2.  No murmurs.  Pulmonary:  Clear to auscultation bilaterally.  No wheezing.  Abdomen:  Soft, nontender, nondistended, no rebound, no guarding.  Extremities:  Nontender calves.  Neurology:  Deep tendon reflexes 2+ bilaterally.              Urinalysis Basic - ( 27 May 2024 06:00 )    Color: x / Appearance: x / SG: x / pH: x  Gluc: 128 mg/dL / Ketone: x  / Bili: x / Urobili: x   Blood: x / Protein: x / Nitrite: x   Leuk Esterase: x / RBC: x / WBC x   Sq Epi: x / Non Sq Epi: x / Bacteria: x             Ms. Negro is a 32yo  @28w5d, with shortness of breath, likely asthma exacerbation low suspicion for PE/VTE, complicated by poorly controlled T2DM, LGA fetus, HSV with outbreak in pregnancy.    #Shortness of breath  - Less likey DVT due to preliminarily negative LE Dopplers  - Less likely influzena or COVID due to negative PCR  - Chest x ray was clear    Likely asthma exacerbation.  - Vitals per routine, continuous pulse-ox  - Prednisone 60 mg q24h for the time being  - c/w Symbicort and singulair qd   - albuterol prn  - started on claritin 10mg qD  - Start PPI  - Duonebs q6h  - Daily peak flow    #T2DM, poorly controlled   - FS q4h  - Current insulin regimen: Glargine 24U AM/ 28U PM, lispro  ordered  - Fingersticks may rise due to Prednisone.  - Cardiology as an outpatient.    #HSV (+)   - c/w valtrex 1 gm daily  - reports last genital outbreak was March    #Obesity - BMI 41  - c/w ASA 81 mg    #AST/ALT elevated  - Will monitor.    #Rh NEG  - Received rhogam 24 due to bleeding  - Follow up [facility] cell free fetal DNA screening for Rh.    Reza Sheppard MD               Ms. Negro is a 32yo  @28w5d, with shortness of breath, likely asthma exacerbation low suspicion for PE/VTE, complicated by poorly controlled T2DM, LGA fetus, HSV with outbreak in pregnancy.    #Shortness of breath  - Less likey DVT due to preliminarily negative LE Dopplers  - Less likely influzena or COVID due to negative PCR  - Chest x ray was clear    Likely asthma exacerbation.  - Vitals per routine, continuous pulse-ox  - Prednisone 60 mg q24h for the time being  - c/w Symbicort and singulair qd   - albuterol prn  - started on claritin 10mg qD  - Start PPI  - Duonebs q6h  - Daily peak flow    #T2DM, poorly controlled   - FS q4h  - Current insulin regimen: Glargine 24U AM/ 28U PM, lispro  ordered  - Fingersticks may rise due to Prednisone.  - Cardiology as an outpatient.    #HSV (+)   - c/w valtrex 1 gm daily  - reports last genital outbreak was March    #Obesity - BMI 41  - c/w ASA 81 mg    #AST/ALT elevated  - Will monitor.    #Rh NEG  - Received rhogam 24 due to bleeding  - Follow up [facility] cell free fetal DNA screening for Rh.    Reza Sheppard MD    120 minutes were spent on this encounter

## 2024-05-27 NOTE — CONSULT NOTE ADULT - SUBJECTIVE AND OBJECTIVE BOX
[facility] Consult    TRIAGE/ADMISSIONI HPI:  HPI:  33 yr old  at 28w5d, FORREST 24 by LMP c/w first tri sono, presents to labor and delivery for decreased fetal movement and shortness of breath. Pt reports no fetal movement for last 2 days. Denies contractions, vaginal bleeding, leakage of fluid. Pt reports recent URI that triggered an asthma exacerbation, came to ED on , was given steroids and albuterol nebulizer and sent home. She was seen on L&D for similar complaints on , had normal vital signs and was discharged after improvement. Pt reports that since then she has been experiencing more wheezing than usual, using rescue inhaler 3-5 times per day. Denies headaches, nausea, vomiting, diarrhea. Denies fever, chills, chest pain, RUQ/epigastric pain. Denies dysuria, hematuria, abnormal discharge, flank pain.    Pregnancy complications:  #Asthma  - On Symbicort and singulair qd   - Usually uses albuterol 1-2 days a week, used inhaler 5x today without relief of symptoms    - Never hospitalized or intubated    #T2DM, poorly controlled   - Reports FS in 120-30s, 2 hr PP in 150s-60s, has not been compliant with FS regimen, did not take FS today  - Insulin regimen increased on :         - current regimen: Glargine 24U AM/ 28U PM, lispro , metformin d/suleman on              - hordeolum in left eye- following with ophthalmologist        - s/p Cardiologist consult- EKG NSR- still needs f/u        - normal fetal echo        - has appt with podiatry  - previously admitted for glycemic control in Mar 2024    #HSV (+)   - on valtrex 1 gm daily  - reports last genital outbreak was March    # Social Issues   - Works at [employer] with odd hours making it difficult to take FS and eat diabetic diet   - Lack of social support; FOB lives with another woman. States she only has one friend, but is willing to help her.    - Adopted; adopted mother passed 2 mo ago (liver cancer), adopted father passed years ago (diabetes), biological mother struggles with addiction.    - Lives alone with 2 pets.   - Declined SW when offered in [facility] clinic     #Obesity  - BMI 41 On ASA 81 mg    #Rh NEG  - Received rhogam 1/11/24 due to bleeding    #Rosacea in pregnancy   (27 May 2024 01:41)        MFM Addendum:  She feels much improved after receiving Prednisone 60 mg PO early this morning, although she still reports a cough and does not yet feel 100%.  She states her peak flow after the duonebs was 250.    PAST MEDICAL & SURGICAL HISTORY:  Asthma  Diabetes mellitus, type 2  Eczema  PCOS (polycystic ovarian syndrome)  Rh negative status during pregnancy  Rosacea      No significant past surgical history          Obstetric history: OB Hx: , Missed AB with D&C, ectopic s/p MTX in     Gyn Hx:   - h/o PCOS  - HSV= on valtrex   - denies fibroids, abnormal paps, other STIs    GYN history:  No abnormal pap smears, no STDs ***    Allergies    No Known Allergies    Intolerances        MEDICATIONS  (STANDING):  albuterol/ipratropium for Nebulization 3 milliLiter(s) Nebulizer every 6 hours  aspirin  chewable 81 milliGRAM(s) Oral daily  budesonide 160 MICROgram(s)/formoterol 4.5 MICROgram(s) Inhaler 2 Puff(s) Inhalation two times a day  dextrose 10% Bolus 125 milliLiter(s) IV Bolus once  dextrose 5%. 1000 milliLiter(s) (50 mL/Hr) IV Continuous <Continuous>  dextrose 5%. 1000 milliLiter(s) (100 mL/Hr) IV Continuous <Continuous>  dextrose 50% Injectable 25 Gram(s) IV Push once  dextrose 50% Injectable 12.5 Gram(s) IV Push once  glucagon  Injectable 1 milliGRAM(s) IntraMuscular once  insulin glargine Injectable (LANTUS) 28 Unit(s) SubCutaneous at bedtime  insulin glargine Injectable (LANTUS) 24 Unit(s) SubCutaneous every morning  insulin lispro Injectable (ADMELOG) 7 Unit(s) SubCutaneous before lunch  insulin lispro Injectable (ADMELOG) 9 Unit(s) SubCutaneous before dinner  insulin lispro Injectable (ADMELOG) 7 Unit(s) SubCutaneous before breakfast  lactated ringers. 1000 milliLiter(s) (125 mL/Hr) IV Continuous <Continuous>  loratadine 10 milliGRAM(s) Oral daily  montelukast 10 milliGRAM(s) Oral daily  predniSONE   Tablet 60 milliGRAM(s) Oral every 24 hours  prenatal multivitamin 1 Tablet(s) Oral daily  valACYclovir 1000 milliGRAM(s) Oral daily      FAMILY HISTORY:  No pertinent family history in first degree relatives        Social history:  No alcohol use, drug use, or smoking.      Review of systems:  A complete review of systems was obtained and is negative except as described in the HPI.    General:  In no apparent distress  T(F): 98.3 (24 @ 15:00), Max: 99.6 (24 @ 20:50)  HR: 106 (24 @ 15:00) (90 - 120)  BP: 114/73 (24 @ 15:00) (112/56 - 129/61)  RR: 18 (24 @ 15:00) (18 - 18)  SpO2: 97% (24 @ 07:56) (93% - 98%) (room air)  I&O's Summary    26 May 2024 07:01  -  27 May 2024 07:00  --------------------------------------------------------  IN: 375 mL / OUT: 0 mL / NET: 375 mL        HEENT:  Atraumatic.    CV:  Normal S1 S2.  No murmurs.  Pulmonary:  Wheezing bilaterally  Abdomen:  Soft, nontender, nondistended, no rebound, no guarding.  Musculoskeletal:  No calf tenderness  Neurology:  Deep tendon reflexes 2+ bilaterally.  Psych:  Awake, alert, oriented to person, place, time.    LABORATORY:                                   12.2   10.54 )-----------( 225      ( 27 May 2024 06:00 )             37.6                         12.6   10.81 )-----------( 240      ( 26 May 2024 22:13 )             38.9           136  |  104  |  13  ----------------------------<  128  4.0   |  18  |  <0.5      137  |  104  |  13  ----------------------------<  169  3.8   |  22  |  0.6    Ca    8.9      27 May 2024 06:00   Mg    x   27 May 2024 06:00  Phos    x    27 May 2024 06:00  Ca    9.4      26 May 2024 22:13   Mg    x   26 May 2024 22:13  Phos    x    26 May 2024 22:13    TPro  6.2  /  Alb  3.6  /  TBili  0.2  /  DBili  x   /  AST  40  /  ALT  84  /  AlkPhos  113  24      Lactate dehydrogenase  x     24  Uric acid    x    24  TPro  6.4  /  Alb  3.5  /  TBili  <0.2  /  DBili  x   /  AST  38  /  ALT  78  /  AlkPhos  123  24      Lactate dehydrogenase  x     24  Uric acid    x    24      PT 10.10  PTT   28.6  INR   0.89  Fibrinogen --  24 @ 22:13    PT/INR - ( 26 May 2024 22:13 )   PT: 10.10 sec;   INR: 0.89 ratio  PTT - ( 26 May 2024 22:13 )  PTT:28.6 sec  D-Dimer Assay, Quantitative (24 @ 22:13)    D-Dimer Assay, Quantitative: 318: Manufacturers recommended Cut off for VTE is 230 ng/ml D-DU ng/mL DDU    Flu With COVID-19 By HECTOR (24 @ 23:07)    SARS-CoV-2 Result: NotDetec:    Influenza A Result: NotDetec   Influenza B Result: NotDete   Resp Syn Virus Result: NotDete      CAPILLARY BLOOD GLUCOSE      POCT Blood Glucose.: 136 mg/dL (27 May 2024 14:40)  POCT Blood Glucose.: 111 mg/dL (27 May 2024 12:15)  POCT Blood Glucose.: 134 mg/dL (27 May 2024 07:30)  POCT Blood Glucose.: 121 mg/dL (27 May 2024 05:06)  POCT Blood Glucose.: 100 mg/dL (27 May 2024 01:07)      < from: Xray Chest 2 Views PA/Lat (24 @ 21:42) >  Impression:    No radiographic evidence of acute cardiopulmonary disease.        --- End of Report ---    < end of copied text >    < from: [facility] Duplex Lower Ext Vein Scan, Bilat (24 @ 22:49) >    IMPRESSION:  No evidence of deep venous thrombosis in either lower extremity. (preliminary report)    < end of copied text >           [facility] Consult    TRIAGE/ADMISSIONI HPI:  HPI:  33 yr old  at 28w5d, FORREST 24 by LMP c/w first tri sono, presents to labor and delivery for decreased fetal movement and shortness of breath. Pt reports no fetal movement for last 2 days. Denies contractions, vaginal bleeding, leakage of fluid. Pt reports recent URI that triggered an asthma exacerbation, came to ED on , was given steroids and albuterol nebulizer and sent home. She was seen on L&D for similar complaints on , had normal vital signs and was discharged after improvement. Pt reports that since then she has been experiencing more wheezing than usual, using rescue inhaler 3-5 times per day. Denies headaches, nausea, vomiting, diarrhea. Denies fever, chills, chest pain, RUQ/epigastric pain. Denies dysuria, hematuria, abnormal discharge, flank pain.    Pregnancy complications:  #Asthma  - On Symbicort and singulair qd   - Usually uses albuterol 1-2 days a week, used inhaler 5x today without relief of symptoms    - Never hospitalized or intubated    #T2DM, poorly controlled   - Reports FS in 120-30s, 2 hr PP in 150s-60s, has not been compliant with FS regimen, did not take FS today  - Insulin regimen increased on :         - current regimen: Glargine 24U AM/ 28U PM, lispro , metformin d/suleman on              - hordeolum in left eye- following with ophthalmologist        - s/p Cardiologist consult- EKG NSR- still needs f/u        - normal fetal echo        - has appt with podiatry  - previously admitted for glycemic control in Mar 2024    #HSV (+)   - on valtrex 1 gm daily  - reports last genital outbreak was March    # Social Issues   - Works at [employer] with odd hours making it difficult to take FS and eat diabetic diet   - Lack of social support; FOB lives with another woman. States she only has one friend, but is willing to help her.    - Adopted; adopted mother passed 2 mo ago (liver cancer), adopted father passed years ago (diabetes), biological mother struggles with addiction.    - Lives alone with 2 pets.   - Declined SW when offered in [facility] clinic     #Obesity  - BMI 41 On ASA 81 mg    #Rh NEG  - Received rhogam 1/11/24 due to bleeding    #Rosacea in pregnancy   (27 May 2024 01:41)        MFM Addendum:  She feels much improved after receiving Prednisone 60 mg PO early this morning, although she still reports a cough and does not yet feel 100%.  She states her peak flow after the duonebs was 250.    PAST MEDICAL & SURGICAL HISTORY:  Asthma  Diabetes mellitus, type 2  Eczema  PCOS (polycystic ovarian syndrome)  Rh negative status during pregnancy  Rosacea      No significant past surgical history          Obstetric history: OB Hx: , Missed AB with D&C, ectopic s/p MTX in     Gyn Hx:   - h/o PCOS  - HSV= on valtrex   - denies fibroids, abnormal paps, other STIs    GYN history:  No abnormal pap smears, no STDs.  Allergies    No Known Allergies    Intolerances        MEDICATIONS  (STANDING):  albuterol/ipratropium for Nebulization 3 milliLiter(s) Nebulizer every 6 hours  aspirin  chewable 81 milliGRAM(s) Oral daily  budesonide 160 MICROgram(s)/formoterol 4.5 MICROgram(s) Inhaler 2 Puff(s) Inhalation two times a day  dextrose 10% Bolus 125 milliLiter(s) IV Bolus once  dextrose 5%. 1000 milliLiter(s) (50 mL/Hr) IV Continuous <Continuous>  dextrose 5%. 1000 milliLiter(s) (100 mL/Hr) IV Continuous <Continuous>  dextrose 50% Injectable 25 Gram(s) IV Push once  dextrose 50% Injectable 12.5 Gram(s) IV Push once  glucagon  Injectable 1 milliGRAM(s) IntraMuscular once  insulin glargine Injectable (LANTUS) 28 Unit(s) SubCutaneous at bedtime  insulin glargine Injectable (LANTUS) 24 Unit(s) SubCutaneous every morning  insulin lispro Injectable (ADMELOG) 7 Unit(s) SubCutaneous before lunch  insulin lispro Injectable (ADMELOG) 9 Unit(s) SubCutaneous before dinner  insulin lispro Injectable (ADMELOG) 7 Unit(s) SubCutaneous before breakfast  lactated ringers. 1000 milliLiter(s) (125 mL/Hr) IV Continuous <Continuous>  loratadine 10 milliGRAM(s) Oral daily  montelukast 10 milliGRAM(s) Oral daily  predniSONE   Tablet 60 milliGRAM(s) Oral every 24 hours  prenatal multivitamin 1 Tablet(s) Oral daily  valACYclovir 1000 milliGRAM(s) Oral daily      FAMILY HISTORY:  No pertinent family history in first degree relatives  Adopted; adopted mother passed 2 mo ago (liver cancer), adopted father passed years ago (diabetes), biological mother struggles with addiction.          Social history:  No alcohol use, drug use, or smoking.      Review of systems:  A complete review of systems was obtained and is negative except as described in the HPI.    General:  In no apparent distress  T(F): 98.3 (24 @ 15:00), Max: 99.6 (24 @ 20:50)  HR: 106 (24 @ 15:00) (90 - 120)  BP: 114/73 (24 @ 15:00) (112/56 - 129/61)  RR: 18 (24 @ 15:00) (18 - 18)  SpO2: 97% (24 @ 07:56) (93% - 98%) (room air)      HEENT:  Atraumatic.    CV:  Normal S1 S2.  No murmurs.  Pulmonary:  Wheezing bilaterally  Abdomen:  Soft, nontender, nondistended, no rebound, no guarding.  Musculoskeletal:  No calf tenderness  Neurology:  Deep tendon reflexes 2+ bilaterally.  Psych:  Awake, alert, oriented to person, place, time.    LABORATORY:                                   12.2   10.54 )-----------( 225      ( 27 May 2024 06:00 )             37.6                         12.6   10.81 )-----------( 240      ( 26 May 2024 22:13 )             38.9           136  |  104  |  13  ----------------------------<  128  4.0   |  18  |  <0.5      137  |  104  |  13  ----------------------------<  169  3.8   |  22  |  0.6    Ca    8.9      27 May 2024 06:00   Mg    x   27 May 2024 06:00  Phos    x    27 May 2024 06:00  Ca    9.4      26 May 2024 22:13   Mg    x   26 May 2024 22:13  Phos    x    26 May 2024 22:13    TPro  6.2  /  Alb  3.6  /  TBili  0.2  /  DBili  x   /  AST  40  /  ALT  84  /  AlkPhos  113  24      Lactate dehydrogenase  x     24  Uric acid    x    24  TPro  6.4  /  Alb  3.5  /  TBili  <0.2  /  DBili  x   /  AST  38  /  ALT  78  /  AlkPhos  123  24      Lactate dehydrogenase  x     24  Uric acid    x    24      PT 10.10  PTT   28.6  INR   0.89  Fibrinogen --  24 @ 22:13    PT/INR - ( 26 May 2024 22:13 )   PT: 10.10 sec;   INR: 0.89 ratio  PTT - ( 26 May 2024 22:13 )  PTT:28.6 sec  D-Dimer Assay, Quantitative (24 @ 22:13)    D-Dimer Assay, Quantitative: 318: Manufacturers recommended Cut off for VTE is 230 ng/ml D-DU ng/mL DDU    Flu With COVID-19 By HECTOR (24 @ 23:07)    SARS-CoV-2 Result: NotDetec:    Influenza A Result: NotDetec   Influenza B Result: NotDetec   Resp Syn Virus Result: NotDetec      CAPILLARY BLOOD GLUCOSE      POCT Blood Glucose.: 136 mg/dL (27 May 2024 14:40)  POCT Blood Glucose.: 111 mg/dL (27 May 2024 12:15)  POCT Blood Glucose.: 134 mg/dL (27 May 2024 07:30)  POCT Blood Glucose.: 121 mg/dL (27 May 2024 05:06)  POCT Blood Glucose.: 100 mg/dL (27 May 2024 01:07)      < from: Xray Chest 2 Views PA/Lat (24 @ 21:42) >  Impression:    No radiographic evidence of acute cardiopulmonary disease.        --- End of Report ---    < end of copied text >    < from: [facility] Duplex Lower Ext Vein Scan, Bilat (24 @ 22:49) >    IMPRESSION:  No evidence of deep venous thrombosis in either lower extremity. (preliminary report)    < end of copied text >

## 2024-05-27 NOTE — OB RN TRIAGE NOTE - FALL HARM RISK - UNIVERSAL INTERVENTIONS
Bed in lowest position, wheels locked, appropriate side rails in place/Call bell, personal items and telephone in reach/Instruct patient to call for assistance before getting out of bed or chair/Non-slip footwear when patient is out of bed/[place] to call system/Physically safe environment - no spills, clutter or unnecessary equipment/Purposeful Proactive Rounding/Room/bathroom lighting operational, light cord in reach

## 2024-05-27 NOTE — OB PROVIDER H&P - NSHPPHYSICALEXAM_GEN_ALL_CORE
Vital Signs Last 24 Hrs  T(C): 37 (27 May 2024 00:57), Max: 37.6 (26 May 2024 20:50)  T(F): 98.6 (27 May 2024 00:57), Max: 99.6 (26 May 2024 20:50)  HR: 102 (27 May 2024 00:57) (102 - 120)  BP: 127/82 (27 May 2024 00:57) (123/58 - 127/82)  RR: 18 (27 May 2024 00:57) (18 - 18)  SpO2: 98% (26 May 2024 20:50) (98% - 98%)    Gen: AOx3, NAD  Pulm: inspiratory and expiratory wheezing b/l; no ronchi or rales auscultated; mild increased work of breathing  Abd: soft, gravid, nontender, no palpable ctx    EFM:  [place]: none  SVE: deferred  BSS: Vital Signs Last 24 Hrs  T(C): 37 (27 May 2024 00:57), Max: 37.6 (26 May 2024 20:50)  T(F): 98.6 (27 May 2024 00:57), Max: 99.6 (26 May 2024 20:50)  HR: 102 (27 May 2024 00:57) (102 - 120)  BP: 127/82 (27 May 2024 00:57) (123/58 - 127/82)  RR: 18 (27 May 2024 00:57) (18 - 18)  SpO2: 98% (26 May 2024 20:50) (98% - 98%)    Gen: AOx3, NAD  Pulm: inspiratory and expiratory wheezing b/l; no ronchi or rales auscultated; mild increased work of breathing  Abd: soft, gravid, nontender, no palpable ctx    EFM: 130s/mod/+Accels  [place]: none  SVE: deferred  BSS: BPP 8/8, vertex, MVP 8.21cm

## 2024-05-27 NOTE — OB RN PATIENT PROFILE - THE IMPORTANCE OF THE CORRECT PLACEMENT OF THE INFANT AND THE PARENT’S ABILITY TO ASSESS THE INFANT'S SKIN COLOR WHILE PLACED ON SKIN TO SKIN HAS BEEN REINFORCED.
"Chief Complaint   Patient presents with     Consult     US results   Lydia Bowers MA      Initial /86 (BP Location: Right arm, Patient Position: Chair, Cuff Size: Adult Regular)  Temp 98.2  F (36.8  C) (Oral)  Ht 5' 1\" (1.549 m)  Wt 160 lb (72.6 kg)  LMP 12/25/2016 (Exact Date)  BMI 30.23 kg/m2 Estimated body mass index is 30.23 kg/(m^2) as calculated from the following:    Height as of this encounter: 5' 1\" (1.549 m).    Weight as of this encounter: 160 lb (72.6 kg).  Medication Reconciliation: complete    " Statement Selected

## 2024-05-27 NOTE — OB PROVIDER H&P - NSLOWPPHRISK_OBGYN_A_OB
No previous uterine incision/[place] Pregnancy/Less than or equal to 4 previous vaginal births/No known bleeding disorder/No history of postpartum hemorrhage/No other PPH risks indicated

## 2024-05-27 NOTE — OB RN PATIENT PROFILE - FALL HARM RISK - UNIVERSAL INTERVENTIONS
Bed in lowest position, wheels locked, appropriate side rails in place/Call bell, personal items and telephone in reach/Instruct patient to call for assistance before getting out of bed or chair/Non-slip footwear when patient is out of bed/[place] to call system/Physically safe environment - no spills, clutter or unnecessary equipment/Purposeful Proactive Rounding/Room/bathroom lighting operational, light cord in reach

## 2024-05-27 NOTE — PROGRESS NOTE ADULT - SUBJECTIVE AND OBJECTIVE BOX
BPP Note    Indication: asthma exacerbation, T2DM  BPP: 8/8, MVP 5.03, vertex,  BPP Note    Indication: asthma exacerbation, T2DM  BPP: 10/10, MVP 5.03, vertex,     MFM procedure note    Non stress test    Date: 2024  Start:  5:22 AM  End:  7:29 AM    Baseline:  125  beats per minute discontinuous  Variability:  moderate  Accelerations: present  Decelerations: absent    Tocometer: No contractions.    Reassuring  testing.    Reza Sheppard MD

## 2024-05-27 NOTE — OB PROVIDER H&P - ATTENDING COMMENTS
32 y/o  @ 28w5d with acute asthma exacerbation in pregnancy as well as pregestational diabetes, admitted for management. Administer duonebs q6h, prednisone. Continue symbicort and singulair, as well as albuterol PRN. Monitor blood glucose and continue current insulin regimen. Close observation.

## 2024-05-27 NOTE — OB PROVIDER H&P - HISTORY OF PRESENT ILLNESS
33 yr old  at 28w5d, FORREST 24 by LMP c/w first tri sono, presents to labor and delivery for decreased fetal movement and shortness of breath. Pt reports no fetal movement for last 2 days. Denies contractions, vaginal bleeding, leakage of fluid. Pt reports recent URI that triggered an asthma exacerbation, came to ED on , was given steroids and albuterol nebulizer and sent home. She was seen on L&D for similar complaints on , had normal vital signs and was discharged after improvement. Pt reports that since then she has been experiencing more wheezing than usual, using rescue inhaler 3-5 times per day. Denies headaches, nausea, vomiting, diarrhea. Denies fever, chills, chest pain, RUQ/epigastric pain. Denies dysuria, hematuria, abnormal discharge, flank pain.    Pregnancy complications:  #Asthma  - On Symbicort and singulair qd   - Usually uses albuterol 1-2 days a week, used inhaler 5x today without relief of symptoms    - Never hospitalized or intubated    #T2DM, poorly controlled   - Reports FS in 120-30s, 2 hr PP in 150s-60s, has not been compliant with FS regimen, did not take FS today  - Insulin regimen increased on :         - current regimen: Glargine 24U AM/ 28U PM, lispro , metformin d/suleman on              - hordeolum in left eye- following with ophthalmologist        - s/p Cardiologist consult- EKG NSR- still needs f/u        - normal fetal echo        - has appt with podiatry  - previously admitted for glycemic control in Mar 2024    #HSV (+)   - on valtrex 1 gm daily  - reports last genital outbreak was March    # Social Issues   - Works at [employer] with odd hours making it difficult to take FS and eat diabetic diet   - Lack of social support; FOB lives with another woman. States she only has one friend, but is willing to help her.    - Adopted; adopted mother passed 2 mo ago (liver cancer), adopted father passed years ago (diabetes), biological mother struggles with addiction.    - Lives alone with 2 pets.   - Declined SW when offered in [facility] clinic     #Obesity  - BMI 41 On ASA 81 mg    #Rh NEG  - Received rhogam 24 due to bleeding    #Rosacea in pregnancy

## 2024-05-27 NOTE — OB RN PATIENT PROFILE - PRO MENTAL HEALTH SX RECENT
9/14/2020 9:15 AM 
 
Patient:  Ashley Hernandez YOB: 1940 Date of Visit: 9/11/2020 Dear Dr. Lolis Mejía: Thank you for referring Ms. Lety Sanchez to me for evaluation/treatment. Below are the relevant portions of my assessment and plan of care. If you have questions, please do not hesitate to call me. I look forward to following Ms. Brian Squires along with you.  
 
 
 
Sincerely, 
 
 
Liz Reece MD 
 
 none

## 2024-05-27 NOTE — OB PROVIDER H&P - NSWIC_OBGYN_ALL_OB
With stable yet inconclusive findings on MRI, due for followup with neurology to determine next best steps.    No

## 2024-05-27 NOTE — OB PROVIDER H&P - ASSESSMENT
32yo  @28w5d, GBS unknown, with acute asthma exacerbation in pregnancy, complicated by poorly controlled T2DM,    JABARI TO FINISH 34yo  @28w5d, GBS unknown, with acute asthma exacerbation in pregnancy, low suspicion for PE/VTE, complicated by poorly controlled T2DM, LGA fetus, HSV with outbreak in pregnancy, with BPP X/X, reassuring maternal and fetal status.    #Asthma  - Admit to L&D for management of acute asthma exacerbation  - Vitals per routine, continuous pulse-ox  - Prednisone 60mg q24h ordered  - c/w Symbicort and singulair qd   - started on claritin 10mg qD  - albuterol prn  - duonebs q6h  - daily peak flow    #T2DM, poorly controlled   - FS q4h  - Current insulin regimen: Glargine 24U AM/ 28U PM, lispro  ordered    #HSV (+)   - c/w valtrex 1 gm daily  - reports last genital outbreak was March    #Obesity - BMI 41  - c/w ASA 81 mg    #Rh NEG  - Received rhogam 24 due to bleeding  - prophylactic 28w rhogam ordered    #pregnancy  -cont EFM/[place]  -SCDs for DVT ppx  -CC diet with evening snack    Discussed with Dr. Aguilar and Dr. Sheppard   32yo  @28w5d, GBS unknown, with acute asthma exacerbation in pregnancy, low suspicion for PE/VTE, complicated by poorly controlled T2DM, LGA fetus, HSV with outbreak in pregnancy, with BPP , reassuring maternal and fetal status.    #Asthma  - Admit to L&D for management of acute asthma exacerbation  - Vitals per routine, continuous pulse-ox  - Prednisone 60mg q24h ordered  - c/w Symbicort and singulair qd   - started on claritin 10mg qD  - albuterol prn  - duonebs q6h  - daily peak flow    #T2DM, poorly controlled   - FS q4h  - Current insulin regimen: Glargine 24U AM/ 28U PM, lispro  ordered    #HSV (+)   - c/w valtrex 1 gm daily  - reports last genital outbreak was March    #Obesity - BMI 41  - c/w ASA 81 mg    #Rh NEG  - Received rhogam 24 due to bleeding  - prophylactic 28w rhogam ordered    #pregnancy  -cont EFM/[place]  -SCDs for DVT ppx  -CC diet with evening snack  -f/u MMR IgG    Discussed with Dr. Aguilar and Dr. Sheppard

## 2024-05-27 NOTE — OB PROVIDER H&P - NS_EDDCALCULATED_OBGYN_ALL_OB
I have reviewed discharge instructions with the patient. The patient verbalized understanding. Patient left ED via Discharge Method: ambulatory to Home with daughter. Opportunity for questions and clarification provided. Patient given 2 scripts. Pt understands all instructions and prescriptions at discharge. Gave pt information also on [facility]. First Trimester Sonogram

## 2024-05-28 LAB
BASOPHILS # BLD AUTO: 0.02 K/UL — SIGNIFICANT CHANGE UP (ref 0–0.2)
BASOPHILS NFR BLD AUTO: 0.2 % — SIGNIFICANT CHANGE UP (ref 0–1)
EOSINOPHIL # BLD AUTO: 0.03 K/UL — SIGNIFICANT CHANGE UP (ref 0–0.7)
EOSINOPHIL NFR BLD AUTO: 0.4 % — SIGNIFICANT CHANGE UP (ref 0–8)
GLUCOSE BLDC GLUCOMTR-MCNC: 121 MG/DL — HIGH (ref 70–99)
GLUCOSE BLDC GLUCOMTR-MCNC: 123 MG/DL — HIGH (ref 70–99)
GLUCOSE BLDC GLUCOMTR-MCNC: 135 MG/DL — HIGH (ref 70–99)
GLUCOSE BLDC GLUCOMTR-MCNC: 146 MG/DL — HIGH (ref 70–99)
GLUCOSE BLDC GLUCOMTR-MCNC: 186 MG/DL — HIGH (ref 70–99)
HCT VFR BLD CALC: 41.2 % — SIGNIFICANT CHANGE UP (ref 37–47)
HGB BLD-MCNC: 13 G/DL — SIGNIFICANT CHANGE UP (ref 12–16)
IMM GRANULOCYTES NFR BLD AUTO: 0.8 % — HIGH (ref 0.1–0.3)
LYMPHOCYTES # BLD AUTO: 1.12 K/UL — LOW (ref 1.2–3.4)
LYMPHOCYTES # BLD AUTO: 13.4 % — LOW (ref 20.5–51.1)
MCHC RBC-ENTMCNC: 26.5 PG — LOW (ref 27–31)
MCHC RBC-ENTMCNC: 31.6 G/DL — LOW (ref 32–37)
MCV RBC AUTO: 84.1 FL — SIGNIFICANT CHANGE UP (ref 81–99)
MONOCYTES # BLD AUTO: 0.25 K/UL — SIGNIFICANT CHANGE UP (ref 0.1–0.6)
MONOCYTES NFR BLD AUTO: 3 % — SIGNIFICANT CHANGE UP (ref 1.7–9.3)
NEUTROPHILS # BLD AUTO: 6.84 K/UL — HIGH (ref 1.4–6.5)
NEUTROPHILS NFR BLD AUTO: 82.2 % — HIGH (ref 42.2–75.2)
NRBC # BLD: 0 /100 WBCS — SIGNIFICANT CHANGE UP (ref 0–0)
PLATELET # BLD AUTO: 256 K/UL — SIGNIFICANT CHANGE UP (ref 130–400)
PMV BLD: 10.2 FL — SIGNIFICANT CHANGE UP (ref 7.4–10.4)
RBC # BLD: 4.9 M/UL — SIGNIFICANT CHANGE UP (ref 4.2–5.4)
RBC # FLD: 14.6 % — HIGH (ref 11.5–14.5)
WBC # BLD: 8.33 K/UL — SIGNIFICANT CHANGE UP (ref 4.8–10.8)
WBC # FLD AUTO: 8.33 K/UL — SIGNIFICANT CHANGE UP (ref 4.8–10.8)

## 2024-05-28 PROCEDURE — 99233 SBSQ HOSP IP/OBS HIGH 50: CPT

## 2024-05-28 RX ORDER — AZITHROMYCIN 500 MG/1
500 TABLET, FILM COATED ORAL ONCE
Refills: 0 | Status: COMPLETED | OUTPATIENT
Start: 2024-05-28 | End: 2024-05-28

## 2024-05-28 RX ORDER — AZITHROMYCIN 500 MG/1
250 TABLET, FILM COATED ORAL EVERY 24 HOURS
Refills: 0 | Status: COMPLETED | OUTPATIENT
Start: 2024-05-29 | End: 2024-06-01

## 2024-05-28 RX ADMIN — BUDESONIDE AND FORMOTEROL FUMARATE DIHYDRATE 2 PUFF(S): 160; 4.5 AEROSOL RESPIRATORY (INHALATION) at 20:24

## 2024-05-28 RX ADMIN — Medication 81 MILLIGRAM(S): at 12:56

## 2024-05-28 RX ADMIN — Medication 7 UNIT(S): at 08:53

## 2024-05-28 RX ADMIN — INSULIN GLARGINE 24 UNIT(S): 100 INJECTION, SOLUTION SUBCUTANEOUS at 08:59

## 2024-05-28 RX ADMIN — Medication 60 MILLIGRAM(S): at 00:25

## 2024-05-28 RX ADMIN — Medication 3 MILLILITER(S): at 20:41

## 2024-05-28 RX ADMIN — LORATADINE 10 MILLIGRAM(S): 10 TABLET ORAL at 12:56

## 2024-05-28 RX ADMIN — Medication 9 UNIT(S): at 18:04

## 2024-05-28 RX ADMIN — Medication 3 MILLILITER(S): at 04:44

## 2024-05-28 RX ADMIN — INSULIN GLARGINE 28 UNIT(S): 100 INJECTION, SOLUTION SUBCUTANEOUS at 23:57

## 2024-05-28 RX ADMIN — MONTELUKAST 10 MILLIGRAM(S): 4 TABLET, CHEWABLE ORAL at 12:55

## 2024-05-28 RX ADMIN — BUDESONIDE AND FORMOTEROL FUMARATE DIHYDRATE 2 PUFF(S): 160; 4.5 AEROSOL RESPIRATORY (INHALATION) at 10:44

## 2024-05-28 RX ADMIN — Medication 3 MILLILITER(S): at 13:19

## 2024-05-28 RX ADMIN — PANTOPRAZOLE SODIUM 40 MILLIGRAM(S): 20 TABLET, DELAYED RELEASE ORAL at 06:12

## 2024-05-28 RX ADMIN — Medication 3 MILLILITER(S): at 07:58

## 2024-05-28 RX ADMIN — VALACYCLOVIR 1000 MILLIGRAM(S): 500 TABLET, FILM COATED ORAL at 12:54

## 2024-05-28 RX ADMIN — Medication 7 UNIT(S): at 13:00

## 2024-05-28 RX ADMIN — AZITHROMYCIN 500 MILLIGRAM(S): 500 TABLET, FILM COATED ORAL at 12:53

## 2024-05-28 NOTE — PROGRESS NOTE ADULT - SUBJECTIVE AND OBJECTIVE BOX
PGY 3 Note  GA: 28.6      Pt seen and examined at bedside. Still complaining of wheezing but improved from prior. Denies CP palpitations. Denies ctx, lof or vaginal bleeding. Good fetal movement.    Ambulating: Yes  Voiding: Yes  Flatus: Yes  Bowel movements: Yes   Diet: Regular    MEDICATIONS  (STANDING):  albuterol/ipratropium for Nebulization 3 milliLiter(s) Nebulizer every 6 hours  aspirin  chewable 81 milliGRAM(s) Oral daily  budesonide 160 MICROgram(s)/formoterol 4.5 MICROgram(s) Inhaler 2 Puff(s) Inhalation two times a day  dextrose 10% Bolus 125 milliLiter(s) IV Bolus once  dextrose 5%. 1000 milliLiter(s) (50 mL/Hr) IV Continuous <Continuous>  dextrose 5%. 1000 milliLiter(s) (100 mL/Hr) IV Continuous <Continuous>  dextrose 50% Injectable 12.5 Gram(s) IV Push once  dextrose 50% Injectable 25 Gram(s) IV Push once  glucagon  Injectable 1 milliGRAM(s) IntraMuscular once  insulin glargine Injectable (LANTUS) 28 Unit(s) SubCutaneous at bedtime  insulin glargine Injectable (LANTUS) 24 Unit(s) SubCutaneous every morning  insulin lispro Injectable (ADMELOG) 7 Unit(s) SubCutaneous before lunch  insulin lispro Injectable (ADMELOG) 9 Unit(s) SubCutaneous before dinner  insulin lispro Injectable (ADMELOG) 7 Unit(s) SubCutaneous before breakfast  loratadine 10 milliGRAM(s) Oral daily  montelukast 10 milliGRAM(s) Oral daily  pantoprazole    Tablet 40 milliGRAM(s) Oral before breakfast  predniSONE   Tablet 60 milliGRAM(s) Oral every 24 hours  valACYclovir 1000 milliGRAM(s) Oral daily    MEDICATIONS  (PRN):  albuterol    90 MICROgram(s) HFA Inhaler 2 Puff(s) Inhalation every 6 hours PRN Shortness of Breath and/or Wheezing  dextrose Oral Gel 15 Gram(s) Oral once PRN Blood Glucose LESS THAN 70 milliGRAM(s)/deciliter    PAST MEDICAL & SURGICAL HISTORY:  Asthma  Diabetes mellitus, type 2  Eczema  PCOS (polycystic ovarian syndrome)  Rh negative status during pregnancy  Rosacea  No significant past surgical history    Physical Exam:   Vital Signs Last 24 Hrs  T(C): 36.9 (28 May 2024 03:20), Max: 37 (27 May 2024 23:27)  T(F): 98.4 (28 May 2024 03:20), Max: 98.6 (27 May 2024 23:27)  HR: 83 (28 May 2024 03:20) (80 - 111)  BP: 115/68 (28 May 2024 03:20) (100/55 - 129/61)  RR: 18 (28 May 2024 03:20) (18 - 18)  SpO2: 97% (28 May 2024 03:20) (93% - 98%)      Gen: AOx3, NAD   Cardio: RRR, S1S2, no m/r/g  Pulm: CTA b/l  Abdomen: soft, gravid, nontender, no palpable contractions   Extremities: no swelling or erythema noted     LABS:                          13.0   8.33  )-----------( 256      ( 28 May 2024 05:37 )             41.2     05-27    136  |  104  |  13  ----------------------------<  128<H>  4.0   |  18  |  <0.5<L>    Ca    8.9      27 May 2024 06:00    TPro  6.2  /  Alb  3.6  /  TBili  0.2  /  DBili  x   /  AST  40  /  ALT  84<H>  /  AlkPhos  113  05-27    PT/INR - ( 26 May 2024 22:13 )   PT: 10.10 sec;   INR: 0.89 ratio         PTT - ( 26 May 2024 22:13 )  PTT:28.6 sec          FS:  POCT Blood Glucose.: 102 mg/dL (27 May 2024 21:00)  POCT Blood Glucose.: 136 mg/dL (27 May 2024 14:40)  POCT Blood Glucose.: 111 mg/dL (27 May 2024 12:15)  POCT Blood Glucose.: 134 mg/dL (27 May 2024 07:30)   5/28/24  MFM Att'g & PGY 3 Note  GA: 28.6  Pt seen and examined at bedside. Still complaining of wheezing but improved from prior. Denies CP palpitations. Denies ctx, lof or vaginal bleeding. Good fetal movement.  Ambulating: Yes  Voiding: Yes  Flatus: Yes  Bowel movements: Yes   Diet: Regular    MEDICATIONS  (STANDING):  albuterol/ipratropium for Nebulization 3 milliLiter(s) Nebulizer every 6 hours  aspirin  chewable 81 milliGRAM(s) Oral daily  budesonide 160 MICROgram(s)/formoterol 4.5 MICROgram(s) Inhaler 2 Puff(s) Inhalation two times a day  insulin glargine Injectable (LANTUS) 28 Unit(s) SubCutaneous at bedtime  insulin lispro Injectable (ADMELOG) 7 Unit(s) SubCutaneous before lunch  insulin lispro Injectable (ADMELOG) 9 Unit(s) SubCutaneous before dinner  loratadine 10 milliGRAM(s) Oral daily  montelukast 10 milliGRAM(s) Oral daily  pantoprazole    Tablet 40 milliGRAM(s) Oral before breakfast  predniSONE   Tablet 60 milliGRAM(s) Oral every 24 hours  valACYclovir 1000 milliGRAM(s) Oral daily    MEDICATIONS  (PRN):  albuterol    90 MICROgram(s) HFA Inhaler 2 Puff(s) Inhalation every 6 hours PRN Shortness of Breath and/or Wheezing  dextrose Oral Gel 15 Gram(s) Oral once PRN Blood Glucose LESS THAN 70 milliGRAM(s)/deciliter    PAST MEDICAL & SURGICAL HISTORY:  Asthma  Diabetes mellitus, type 2  Eczema  PCOS (polycystic ovarian syndrome)  Rh negative status during pregnancy  Rosacea  No significant past surgical history    Physical Exam:   Vital Signs Last 24 Hrs  T(C): 36.9 (28 May 2024 03:20), Max: 37 (27 May 2024 23:27)  T(F): 98.4 (28 May 2024 03:20), Max: 98.6 (27 May 2024 23:27)  HR: 83 (28 May 2024 03:20) (80 - 111)  BP: 115/68 (28 May 2024 03:20) (100/55 - 129/61)  RR: 18 (28 May 2024 03:20) (18 - 18)  SpO2: 97% (28 May 2024 03:20) (93% - 98%)      Gen: AOx3, NAD   Cardio: RRR, S1S2, no m/r/g  Pulm: CTA b/l  Abdomen: soft, gravid, nontender, no palpable contractions   Extremities: no swelling or erythema noted     LABS:                          13.0   8.33  )-----------( 256      ( 28 May 2024 05:37 )             41.2     05-27    136  |  104  |  13  ----------------------------<  128<H>  4.0   |  18  |  <0.5<L>    Ca    8.9      27 May 2024 06:00    TPro  6.2  /  Alb  3.6  /  TBili  0.2  /  DBili  x   /  AST  40  /  ALT  84<H>  /  AlkPhos  113  05-27    PT/INR - ( 26 May 2024 22:13 )   PT: 10.10 sec;   INR: 0.89 ratio         PTT - ( 26 May 2024 22:13 )  PTT:28.6 sec          FS:  POCT Blood Glucose.: 102 mg/dL (27 May 2024 21:00)  POCT Blood Glucose.: 136 mg/dL (27 May 2024 14:40)  POCT Blood Glucose.: 111 mg/dL (27 May 2024 12:15)  POCT Blood Glucose.: 134 mg/dL (27 May 2024 07:30)

## 2024-05-28 NOTE — PROGRESS NOTE ADULT - ASSESSMENT
Ms. Negro is a 32yo  @28w6d, with shortness of breath, likely asthma exacerbation low suspicion for PE/VTE, complicated by poorly controlled T2DM, LGA fetus, HSV with outbreak in pregnancy.    #Shortness of breath  - Asthma exacerbation likely 2/2 to URI  - Less likey DVT due to preliminarily negative LE Dopplers  - Less likely influzena or COVID due to negative PCR  - Chest x ray was clear    Likely asthma exacerbation.  - REGIMEN: PO Prednisone 60mg qd, symbicort 160 mcg BID, singular 10qd, claritin 10 mg qd, albuterol PRN, duonebs q6h, protonix 40mg daily  - Vitals per routine, continuous pulse-ox  - Daily peak flow    #T2DM, poorly controlled   - FS, 2PP  - Current insulin regimen: Glargine 24U AM/ 28U PM, lispro  ordered  - Fingersticks may rise due to Prednisone.  - Cardiology as an outpatient.    #HSV (+)   - c/w valtrex 1 gm daily  - reports last genital outbreak was March    #Obesity - BMI 41  - c/w ASA 81 mg    #AST/ALT elevated  - Will monitor.    #Rh NEG  - Received rhogam 24 due to bleeding  - Follow up [facility] cell free fetal DNA screening for Rh.    #pregnancy  -daily labs  -f/u MMR   Ms. Negro is a 32yo  @28w6d, with shortness of breath, likely asthma exacerbation low suspicion for PE/VTE, complicated by poorly controlled T2DM, LGA fetus, HSV with outbreak in pregnancy.    #Shortness of breath  - Asthma exacerbation likely 2/2 to URI  - Less likey DVT due to preliminarily negative LE Dopplers  - Less likely influzena or COVID due to negative PCR  - Chest x ray was clear    Likely asthma exacerbation.  - REGIMEN: PO Prednisone 60mg qd, symbicort 160 mcg BID, singular 10qd, claritin 10 mg qd, albuterol PRN, duonebs q6h, protonix 40mg daily  - Vitals per routine, continuous pulse-ox  - Daily peak flow    #T2DM, poorly controlled   - FS, 2PP  - Current insulin regimen: Glargine 24U AM/ 28U PM, lispro  ordered  - Fingersticks may rise due to Prednisone.  - Cardiology as an outpatient.    #HSV (+)   - c/w valtrex 1 gm daily  - reports last genital outbreak was March    #Obesity - BMI 41  - c/w ASA 81 mg    #AST/ALT elevated  - Will monitor.    #Rh NEG  - Received rhogam 24 due to bleeding  - Follow up [facility] cell free fetal DNA screening for Rh.    #pregnancy  -daily labs  -f/u MMR  MD Hiram, FACOG with MD [facility], PGY-3

## 2024-05-28 NOTE — PROGRESS NOTE ADULT - ATTENDING COMMENTS
Acute asthma exacerbation in pregnancy, responding to albuterol nebs, po steroids and supportive care.  Continue inpatient management.  loveb

## 2024-05-29 LAB
BASOPHILS # BLD AUTO: 0.02 K/UL — SIGNIFICANT CHANGE UP (ref 0–0.2)
BASOPHILS NFR BLD AUTO: 0.2 % — SIGNIFICANT CHANGE UP (ref 0–1)
EOSINOPHIL # BLD AUTO: 0.02 K/UL — SIGNIFICANT CHANGE UP (ref 0–0.7)
EOSINOPHIL NFR BLD AUTO: 0.2 % — SIGNIFICANT CHANGE UP (ref 0–8)
GLUCOSE BLDC GLUCOMTR-MCNC: 112 MG/DL — HIGH (ref 70–99)
GLUCOSE BLDC GLUCOMTR-MCNC: 137 MG/DL — HIGH (ref 70–99)
GLUCOSE BLDC GLUCOMTR-MCNC: 165 MG/DL — HIGH (ref 70–99)
GLUCOSE BLDC GLUCOMTR-MCNC: 184 MG/DL — HIGH (ref 70–99)
GLUCOSE BLDC GLUCOMTR-MCNC: 97 MG/DL — SIGNIFICANT CHANGE UP (ref 70–99)
HCT VFR BLD CALC: 39.5 % — SIGNIFICANT CHANGE UP (ref 37–47)
HGB BLD-MCNC: 12.7 G/DL — SIGNIFICANT CHANGE UP (ref 12–16)
IMM GRANULOCYTES NFR BLD AUTO: 0.8 % — HIGH (ref 0.1–0.3)
LYMPHOCYTES # BLD AUTO: 1.14 K/UL — LOW (ref 1.2–3.4)
LYMPHOCYTES # BLD AUTO: 12.4 % — LOW (ref 20.5–51.1)
MCHC RBC-ENTMCNC: 27 PG — SIGNIFICANT CHANGE UP (ref 27–31)
MCHC RBC-ENTMCNC: 32.2 G/DL — SIGNIFICANT CHANGE UP (ref 32–37)
MCV RBC AUTO: 84 FL — SIGNIFICANT CHANGE UP (ref 81–99)
MEV IGG SER-ACNC: 233 AU/ML — SIGNIFICANT CHANGE UP
MEV IGG+IGM SER-IMP: POSITIVE — SIGNIFICANT CHANGE UP
MONOCYTES # BLD AUTO: 0.21 K/UL — SIGNIFICANT CHANGE UP (ref 0.1–0.6)
MONOCYTES NFR BLD AUTO: 2.3 % — SIGNIFICANT CHANGE UP (ref 1.7–9.3)
MUV AB SER-ACNC: POSITIVE — SIGNIFICANT CHANGE UP
MUV IGG FLD-ACNC: 148 AU/ML — SIGNIFICANT CHANGE UP
NEUTROPHILS # BLD AUTO: 7.71 K/UL — HIGH (ref 1.4–6.5)
NEUTROPHILS NFR BLD AUTO: 84.1 % — HIGH (ref 42.2–75.2)
NRBC # BLD: 0 /100 WBCS — SIGNIFICANT CHANGE UP (ref 0–0)
PLATELET # BLD AUTO: 251 K/UL — SIGNIFICANT CHANGE UP (ref 130–400)
PMV BLD: 10 FL — SIGNIFICANT CHANGE UP (ref 7.4–10.4)
RBC # BLD: 4.7 M/UL — SIGNIFICANT CHANGE UP (ref 4.2–5.4)
RBC # FLD: 14.5 % — SIGNIFICANT CHANGE UP (ref 11.5–14.5)
RUBV IGG SER-ACNC: 1 INDEX — SIGNIFICANT CHANGE UP
RUBV IGG SER-IMP: SIGNIFICANT CHANGE UP
WBC # BLD: 9.17 K/UL — SIGNIFICANT CHANGE UP (ref 4.8–10.8)
WBC # FLD AUTO: 9.17 K/UL — SIGNIFICANT CHANGE UP (ref 4.8–10.8)

## 2024-05-29 PROCEDURE — 99222 1ST HOSP IP/OBS MODERATE 55: CPT

## 2024-05-29 PROCEDURE — 99232 SBSQ HOSP IP/OBS MODERATE 35: CPT

## 2024-05-29 RX ORDER — INSULIN GLARGINE 100 [IU]/ML
32 INJECTION, SOLUTION SUBCUTANEOUS AT BEDTIME
Refills: 0 | Status: DISCONTINUED | OUTPATIENT
Start: 2024-05-29 | End: 2024-05-30

## 2024-05-29 RX ORDER — INSULIN GLARGINE 100 [IU]/ML
28 INJECTION, SOLUTION SUBCUTANEOUS EVERY MORNING
Refills: 0 | Status: DISCONTINUED | OUTPATIENT
Start: 2024-05-30 | End: 2024-06-01

## 2024-05-29 RX ADMIN — LORATADINE 10 MILLIGRAM(S): 10 TABLET ORAL at 13:33

## 2024-05-29 RX ADMIN — MONTELUKAST 10 MILLIGRAM(S): 4 TABLET, CHEWABLE ORAL at 11:39

## 2024-05-29 RX ADMIN — Medication 3 MILLILITER(S): at 22:58

## 2024-05-29 RX ADMIN — Medication 81 MILLIGRAM(S): at 11:39

## 2024-05-29 RX ADMIN — PANTOPRAZOLE SODIUM 40 MILLIGRAM(S): 20 TABLET, DELAYED RELEASE ORAL at 08:26

## 2024-05-29 RX ADMIN — Medication 9 UNIT(S): at 18:54

## 2024-05-29 RX ADMIN — VALACYCLOVIR 1000 MILLIGRAM(S): 500 TABLET, FILM COATED ORAL at 13:33

## 2024-05-29 RX ADMIN — INSULIN GLARGINE 32 UNIT(S): 100 INJECTION, SOLUTION SUBCUTANEOUS at 22:28

## 2024-05-29 RX ADMIN — Medication 7 UNIT(S): at 08:10

## 2024-05-29 RX ADMIN — Medication 3 MILLILITER(S): at 07:48

## 2024-05-29 RX ADMIN — INSULIN GLARGINE 24 UNIT(S): 100 INJECTION, SOLUTION SUBCUTANEOUS at 11:39

## 2024-05-29 RX ADMIN — Medication 3 MILLILITER(S): at 13:35

## 2024-05-29 RX ADMIN — Medication 60 MILLIGRAM(S): at 01:07

## 2024-05-29 RX ADMIN — Medication 7 UNIT(S): at 11:40

## 2024-05-29 RX ADMIN — AZITHROMYCIN 250 MILLIGRAM(S): 500 TABLET, FILM COATED ORAL at 06:06

## 2024-05-29 NOTE — PROGRESS NOTE ADULT - SUBJECTIVE AND OBJECTIVE BOX
PGY 3 Note  GA: 29.0      Pt seen and examined at bedside. Still complaining of wheezing, the same as yesterday. Denies ctx, lof or vaginal bleeding.  Pt denies headache, dizziness, weakness, abdominal pain, palpitations, or diaphoresis.     Ambulating: Yes  Voiding: Yes  Flatus: Yes  Bowel movements: Yes   Diet: Regular    MEDICATIONS  (STANDING):  albuterol/ipratropium for Nebulization 3 milliLiter(s) Nebulizer every 6 hours  aspirin  chewable 81 milliGRAM(s) Oral daily  azithromycin   Tablet 250 milliGRAM(s) Oral every 24 hours  budesonide 160 MICROgram(s)/formoterol 4.5 MICROgram(s) Inhaler 2 Puff(s) Inhalation two times a day  dextrose 10% Bolus 125 milliLiter(s) IV Bolus once  dextrose 5%. 1000 milliLiter(s) (100 mL/Hr) IV Continuous <Continuous>  dextrose 5%. 1000 milliLiter(s) (50 mL/Hr) IV Continuous <Continuous>  dextrose 50% Injectable 25 Gram(s) IV Push once  dextrose 50% Injectable 12.5 Gram(s) IV Push once  glucagon  Injectable 1 milliGRAM(s) IntraMuscular once  insulin glargine Injectable (LANTUS) 28 Unit(s) SubCutaneous at bedtime  insulin glargine Injectable (LANTUS) 24 Unit(s) SubCutaneous every morning  insulin lispro Injectable (ADMELOG) 7 Unit(s) SubCutaneous before lunch  insulin lispro Injectable (ADMELOG) 9 Unit(s) SubCutaneous before dinner  insulin lispro Injectable (ADMELOG) 7 Unit(s) SubCutaneous before breakfast  loratadine 10 milliGRAM(s) Oral daily  montelukast 10 milliGRAM(s) Oral daily  pantoprazole    Tablet 40 milliGRAM(s) Oral before breakfast  predniSONE   Tablet 60 milliGRAM(s) Oral every 24 hours  valACYclovir 1000 milliGRAM(s) Oral daily    MEDICATIONS  (PRN):  albuterol    90 MICROgram(s) HFA Inhaler 2 Puff(s) Inhalation every 6 hours PRN Shortness of Breath and/or Wheezing  dextrose Oral Gel 15 Gram(s) Oral once PRN Blood Glucose LESS THAN 70 milliGRAM(s)/deciliter      PAST MEDICAL & SURGICAL HISTORY:  Asthma  Diabetes mellitus, type 2  Eczema  PCOS (polycystic ovarian syndrome)  Rh negative status during pregnancy  Rosacea  No significant past surgical history    Physical Exam:   Vital Signs Last 24 Hrs  T(C): 36.6 (29 May 2024 07:18), Max: 37.1 (28 May 2024 19:54)  T(F): 97.9 (29 May 2024 07:18), Max: 98.8 (28 May 2024 19:54)  HR: 82 (29 May 2024 07:18) (76 - 105)  BP: 120/73 (29 May 2024 07:18) (107/62 - 127/72)  RR: 18 (29 May 2024 07:18) (18 - 18)  SpO2: 97% (29 May 2024 07:18) (96% - 99%)    Gen: AOx3, NAD   Cardio: RRR, S1S2, no m/r/g  Pulm: CTA b/l  Abdomen: soft, gravid, nontender, no palpable contractions   Extremities: no swelling or erythema noted     Labs:                        12.7   9.17  )-----------( 251      ( 29 May 2024 06:17 )             39.5                         13.0   8.33  )-----------( 256      ( 28 May 2024 05:37 )             41.2      CAPILLARY BLOOD GLUCOSE      POCT Blood Glucose.: 165 mg/dL (29 May 2024 07:18)  POCT Blood Glucose.: 123 mg/dL (28 May 2024 23:53)  POCT Blood Glucose.: 121 mg/dL (28 May 2024 20:06)  POCT Blood Glucose.: 186 mg/dL (28 May 2024 14:53)  POCT Blood Glucose.: 135 mg/dL (28 May 2024 12:50)

## 2024-05-29 NOTE — PROGRESS NOTE ADULT - ASSESSMENT
Ms. Negro is a 32yo  @29w0d, with shortness of breath, likely asthma exacerbation low suspicion for PE/VTE, complicated by poorly controlled T2DM, LGA fetus, HSV with outbreak in pregnancy.    #Shortness of breath  - Asthma exacerbation likely 2/2 to URI  - Less likey DVT due to preliminarily negative LE Dopplers  - Less likely influzena or COVID due to negative PCR  - Chest x ray was clear    Likely asthma exacerbation.  - REGIMEN: PO Prednisone 60mg qd, symbicort 160 mcg BID, singular 10qd, claritin 10 mg qd, albuterol PRN, duonebs q6h, protonix 40mg daily  - Vitals per routine, continuous pulse-ox  - Daily peak flow    #T2DM, poorly controlled   - FS, 2PP  - Current insulin regimen: Glargine 24U AM/ 28U PM, lispro  ordered  - Fingersticks may rise due to Prednisone.  - Cardiology as an outpatient.    #HSV (+)   - c/w valtrex 1 gm daily  - reports last genital outbreak was March    #Obesity - BMI 41  - c/w ASA 81 mg    #AST/ALT elevated  - Will monitor.    #Rh NEG  - Received rhogam 24 due to bleeding  - Follow up [facility] cell free fetal DNA screening for Rh.    #pregnancy  -daily labs   Ms. Negro is a 32yo  @29w0d, with shortness of breath, likely asthma exacerbation low suspicion for PE/VTE, complicated by poorly controlled T2DM, LGA fetus, HSV with outbreak in pregnancy.    #Shortness of breath  - Asthma exacerbation likely 2/2 to URI  - Less likey DVT due to preliminarily negative LE Dopplers  - Less likely influenza or COVID due to negative PCR  - Chest x ray was clear    Likely asthma exacerbation.  - REGIMEN: PO Prednisone 60mg qd, symbicort 160 mcg BID, singular 10qd, claritin 10 mg qd, albuterol PRN, duonebs q6h, protonix 40mg daily  - Vitals per routine, continuous pulse-ox  - Daily peak flow    #T2DM, poorly controlled   - FS, 2PP  - Current insulin regimen: Glargine 24U AM/ 28U PM, lispro  ordered  - Fingersticks may rise due to Prednisone.  - Cardiology as an outpatient.    #HSV (+)   - c/w valtrex 1 gm daily  - reports last genital outbreak was March    #Obesity - BMI 41  - c/w ASA 81 mg    #AST/ALT elevated  - Will monitor.    #Rh NEG  - Received rhogam 24 due to bleeding  - Follow up [facility] cell free fetal DNA screening for Rh.    #pregnancy  -daily labs

## 2024-05-29 NOTE — CONSULT NOTE ADULT - ASSESSMENT
Impression    Asthma exacerbation  Viral URI  29 weeks pregnant  DM  HO obesity     Plan    CXR reviewed  RVP (-)  Pt is on RA; Wheezing BL, improvement in her SOB  Symbicrot 160mcg 2puffs BID  Prednisone 40mg, taper over 10 days  albuterol PRN  O/p follow up with pulmonary Impression    Asthma exacerbation  29 weeks pregnant  DM  HO obesity     Plan    CXR reviewed; no infiltrates   RVP (-)  Pt is on RA; Wheezing BL, improvement in her SOB  Symbicort 160mcg 2puffs BID  Prednisone 40mg, taper over 10 days: 40mg for 5 days then 20mg for 5 days   Montelukast daily   Albuterol PRN  Peak flow measurement for personal best  On room air   O/p follow up with pulmonary: Dr Nash  Impression    Asthma exacerbation  29 weeks pregnant  DM  HO obesity     Plan    CXR reviewed; no infiltrates   RVP (-)  Pt is on RA; Wheezing BL, improvement in her SOB  Has purulent secretions - recommend 5 days of abx: Augmentin is adequate  Symbicort 160mcg 2puffs BID  Prednisone 40mg, taper over 10 days: 40mg for 5 days then 20mg for 5 days   Montelukast daily   Albuterol PRN  Peak flow measurement for personal best  On room air   O/p follow up with pulmonary: Dr Nash

## 2024-05-29 NOTE — PROGRESS NOTE ADULT - ATTENDING COMMENTS
[facility] Staff    I saw and evaluated Ms. TAYLOR NEGRO  with Dr. Snowden.  Ms. TAYLOR NEGRO reports positive fetal movement and no vaginal bleeding.  She feels the same today as yesterday.  She continue to have a cough and some wheezing.    General:  Ms. TAYLOR NEGRO is lying in bed.  She appears comfortable.    Vital Signs Last 24 Hrs  T(C): 36.8 (29 May 2024 11:14), Max: 37.1 (28 May 2024 19:54)  T(F): 98.3 (29 May 2024 11:14), Max: 98.8 (28 May 2024 19:54)  HR: 88 (29 May 2024 11:14) (82 - 105)  BP: 114/71 (29 May 2024 11:14) (107/62 - 127/72)  BP(mean): --  RR: 18 (29 May 2024 11:14) (18 - 18)  SpO2: 97% (29 May 2024 11:14) (96% - 99%) Room air    Pulmonary:  Wheezing.  Abdomen:  Soft, nontender, nondistended, no rebound, no guarding.  Extremities:  Nontender calves.                          12.7   9.17  )-----------( 251      ( 29 May 2024 06:17 )             39.5                         13.0   8.33  )-----------( 256      ( 28 May 2024 05:37 )             41.2                         12.2   10.54 )-----------( 225      ( 27 May 2024 06:00 )             37.6                         12.6   10.81 )-----------( 240      ( 26 May 2024 22:13 )             38.9     CAPILLARY BLOOD GLUCOSE      POCT Blood Glucose.: 112 mg/dL (29 May 2024 11:26)  POCT Blood Glucose.: 165 mg/dL (29 May 2024 07:18)  POCT Blood Glucose.: 123 mg/dL (28 May 2024 23:53)  POCT Blood Glucose.: 121 mg/dL (28 May 2024 20:06)  POCT Blood Glucose.: 186 mg/dL (28 May 2024 14:53)      Ms. Negro is a 32yo  @29w0d, with shortness of breath, likely asthma exacerbation low suspicion for PE/VTE, complicated by poorly controlled T2DM, LGA fetus, HSV with outbreak in pregnancy.    #Shortness of breath  - Less likely DVT due to preliminarily negative LE Dopplers  - Less likely influenza or COVID due to negative PCR  - Chest x ray was clear    Likely asthma exacerbation.  - Vitals per routine,   - Prednisone 60 mg q24h for the time being  - c/w Symbicort and singulair qd   - albuterol prn  - Claritin 10mg qD  - PPI  - Duonebs q6h  - Daily peak flow.  Today was 275.  - Pulmonary consult.    #T2DM, poorly controlled   - FS q4h  - Current insulin regimen: Glargine 24U AM/ 28U PM, lispro    - New insulin regimen:  Glargine 28U AM/ 32U PM, lispro    - Fingersticks may rise due to Prednisone.  - Cardiology as an outpatient.    #HSV (+)   - c/w valtrex 1 gm daily  - reports last genital outbreak was March    #Obesity - BMI 41  - c/w ASA 81 mg    #AST/ALT elevated  - Will monitor, will repeat in AM.    #Rh NEG  - Received rhogam 24 due to bleeding  - Follow up [facility] cell free fetal DNA screening for Rh.    Reza Sheppard MD

## 2024-05-29 NOTE — CONSULT NOTE ADULT - ATTENDING COMMENTS
Asthma exacerbation   Quickly taper off steroids   Albuterol PRN  Symbicort BID   Montelukast daily   These are all category B   Avoidance of triggers   CXR clear   Recall PRN Asthma exacerbation   Quickly taper off steroids   Albuterol PRN  Symbicort BID   Montelukast daily   5 day course of abx   These are all category B   Avoidance of triggers   CXR clear   Recall PRN

## 2024-05-29 NOTE — PATIENT PROFILE ADULT - SURGICAL SITE INCISION
Problem: Adult Inpatient Plan of Care  Goal: Plan of Care Review  Outcome: Ongoing, Progressing  Goal: Patient-Specific Goal (Individualized)  Outcome: Ongoing, Progressing  Flowsheets (Taken 5/22/2023 1556)  Anxieties, Fears or Concerns: wound healing  Individualized Care Needs: abx therapy  Goal: Absence of Hospital-Acquired Illness or Injury  Outcome: Ongoing, Progressing  Intervention: Identify and Manage Fall Risk  Flowsheets (Taken 5/22/2023 1556)  Safety Promotion/Fall Prevention:   assistive device/personal item within reach   bed alarm set   nonskid shoes/socks when out of bed   side rails raised x 2  Intervention: Prevent and Manage VTE (Venous Thromboembolism) Risk  Flowsheets (Taken 5/22/2023 1556)  Activity Management: Rolling - L1  VTE Prevention/Management: bleeding precations maintained  Goal: Optimal Comfort and Wellbeing  Outcome: Ongoing, Progressing  Goal: Readiness for Transition of Care  Outcome: Ongoing, Progressing     Problem: Diabetes Comorbidity  Goal: Blood Glucose Level Within Targeted Range  Outcome: Ongoing, Progressing     Problem: Adjustment to Illness (Sepsis/Septic Shock)  Goal: Optimal Coping  Outcome: Ongoing, Progressing     Problem: Bleeding (Sepsis/Septic Shock)  Goal: Absence of Bleeding  Outcome: Ongoing, Progressing     Problem: Glycemic Control Impaired (Sepsis/Septic Shock)  Goal: Blood Glucose Level Within Desired Range  Outcome: Ongoing, Progressing  Intervention: Optimize Glycemic Control  Flowsheets (Taken 5/22/2023 1556)  Glycemic Management: blood glucose monitored     Problem: Infection Progression (Sepsis/Septic Shock)  Goal: Absence of Infection Signs and Symptoms  Outcome: Ongoing, Progressing     Problem: Nutrition Impaired (Sepsis/Septic Shock)  Goal: Optimal Nutrition Intake  Outcome: Ongoing, Progressing     Problem: Infection  Goal: Absence of Infection Signs and Symptoms  Outcome: Ongoing, Progressing     Problem: Impaired Wound Healing  Goal: Optimal  Wound Healing  Outcome: Ongoing, Progressing     Problem: Skin Injury Risk Increased  Goal: Skin Health and Integrity  Outcome: Ongoing, Progressing  Intervention: Optimize Skin Protection  Flowsheets (Taken 5/22/2023 [id number])  Pressure Reduction Techniques:   weight shift assistance provided   pressure points protected   positioned off wounds  Pressure Reduction Devices: specialty bed utilized  Skin Protection:   adhesive use limited   incontinence pads utilized   tubing/devices free from skin contact  Head of Bed (HOB) Positioning: HOB at 30-45 degrees     Problem: Fall Injury Risk  Goal: Absence of Fall and Fall-Related Injury  Outcome: Ongoing, Progressing  Intervention: Identify and Manage Contributors  Flowsheets (Taken 5/22/2023 7328)  Self-Care Promotion: BADL personal objects within reach  Medication Review/Management: medications reviewed      no

## 2024-05-29 NOTE — PATIENT PROFILE ADULT - FALL HARM RISK - UNIVERSAL INTERVENTIONS
Bed in lowest position, wheels locked, appropriate side rails in place/Call bell, personal items and telephone in reach/Instruct patient to call for assistance before getting out of bed or chair/Non-slip footwear when patient is out of bed/[place] to call system/Physically safe environment - no spills, clutter or unnecessary equipment/Purposeful Proactive Rounding/Room/bathroom lighting operational, light cord in reach

## 2024-05-29 NOTE — CONSULT NOTE ADULT - SUBJECTIVE AND OBJECTIVE BOX
Patient is a 33y old  Female who presents with a chief complaint of asthma (29 May 2024 09:08)      HPI:  33 yr old  at 28w5d, FORREST 24 by LMP c/w first tri sono, presents to labor and delivery for decreased fetal movement and shortness of breath. Pt reports no fetal movement for last 2 days. Denies contractions, vaginal bleeding, leakage of fluid. Pt reports recent URI that triggered an asthma exacerbation, came to ED on , was given steroids and albuterol nebulizer and sent home. She was seen on L&D for similar complaints on , had normal vital signs and was discharged after improvement. Pt reports that since then she has been experiencing more wheezing than usual, using rescue inhaler 3-5 times per day. Denies headaches, nausea, vomiting, diarrhea. Denies fever, chills, chest pain, RUQ/epigastric pain. Denies dysuria, hematuria, abnormal discharge, flank pain.    Pregnancy complications:  #Asthma  - On Symbicort and singulair qd   - Usually uses albuterol 1-2 days a week, used inhaler 5x today without relief of symptoms    - Never hospitalized or intubated    #T2DM, poorly controlled   - Reports FS in 120-30s, 2 hr PP in 150s-60s, has not been compliant with FS regimen, did not take FS today  - Insulin regimen increased on :         - current regimen: Glargine 24U AM/ 28U PM, lispro , metformin d/suleman on              - hordeolum in left eye- following with ophthalmologist        - s/p Cardiologist consult- EKG NSR- still needs f/u        - normal fetal echo        - has appt with podiatry  - previously admitted for glycemic control in Mar 2024    #HSV (+)   - on valtrex 1 gm daily  - reports last genital outbreak was March    # Social Issues   - Works at [employer] with odd hours making it difficult to take FS and eat diabetic diet   - Lack of social support; FOB lives with another woman. States she only has one friend, but is willing to help her.    - Adopted; adopted mother passed 2 mo ago (liver cancer), adopted father passed years ago (diabetes), biological mother struggles with addiction.    - Lives alone with 2 pets.   - Declined SW when offered in [facility] clinic     #Obesity  - BMI 41 On ASA 81 mg    #Rh NEG  - Received rhogam 24 due to bleeding    #Rosacea in pregnancy   (27 May 2024 01:41)      PAST MEDICAL & SURGICAL HISTORY:  Asthma      Diabetes mellitus, type 2      Eczema      PCOS (polycystic ovarian syndrome)      Rh negative status during pregnancy      Rosacea      No significant past surgical history          SOCIAL HX:   Smoking       never                  ETOH                            Other    FAMILY HISTORY:  No pertinent family history in first degree relatives    .  No cardiovascular or pulmonary family history     REVIEW OF SYSTEMS:    All ROS are negative exept per HPI       Allergies    No Known Allergies    Intolerances          PHYSICAL EXAM  Vital Signs Last 24 Hrs  T(C): 36.8 (29 May 2024 11:14), Max: 37.1 (28 May 2024 19:54)  T(F): 98.3 (29 May 2024 11:14), Max: 98.8 (28 May 2024 19:54)  HR: 88 (29 May 2024 11:14) (82 - 105)  BP: 114/71 (29 May 2024 11:14) (107/62 - 127/72)  BP(mean): --  RR: 18 (29 May 2024 11:14) (18 - 18)  SpO2: 97% (29 May 2024 11:14) (96% - 99%)    Parameters below as of 28 May 2024 19:54  Patient On (Oxygen Delivery Method): room air        CONSTITUTIONAL:  Well nourished.  NAD    CARDIAC:   Normal rate,   regular rhythm.    no edema    RESPIRATORY:   BL diffuse wheezing     GASTROINTESTINAL:  Abdomen soft, non-tender,   No guarding,   Positive BS    MUSCULOSKELETAL:   Range of motion is not limited,  No clubbing, cyanosis    NEUROLOGICAL:   Alert and oriented   No motor deficits.    SKIN:   Skin normal color for race,   No evidence of rash.      HEME LYMPH:   No cervical  lymphadenopathy.  no inguinal lymphadenopathy          LABS:                          12.7   9.17  )-----------( 251      ( 29 May 2024 06:17 )             39.5                                                                                                                                                                                                                                   MEDICATIONS  (STANDING):  albuterol/ipratropium for Nebulization 3 milliLiter(s) Nebulizer every 6 hours  aspirin  chewable 81 milliGRAM(s) Oral daily  azithromycin   Tablet 250 milliGRAM(s) Oral every 24 hours  budesonide 160 MICROgram(s)/formoterol 4.5 MICROgram(s) Inhaler 2 Puff(s) Inhalation two times a day  dextrose 10% Bolus 125 milliLiter(s) IV Bolus once  dextrose 5%. 1000 milliLiter(s) (100 mL/Hr) IV Continuous <Continuous>  dextrose 5%. 1000 milliLiter(s) (50 mL/Hr) IV Continuous <Continuous>  dextrose 50% Injectable 25 Gram(s) IV Push once  dextrose 50% Injectable 12.5 Gram(s) IV Push once  glucagon  Injectable 1 milliGRAM(s) IntraMuscular once  insulin glargine Injectable (LANTUS) 28 Unit(s) SubCutaneous at bedtime  insulin glargine Injectable (LANTUS) 24 Unit(s) SubCutaneous every morning  insulin lispro Injectable (ADMELOG) 7 Unit(s) SubCutaneous before lunch  insulin lispro Injectable (ADMELOG) 9 Unit(s) SubCutaneous before dinner  insulin lispro Injectable (ADMELOG) 7 Unit(s) SubCutaneous before breakfast  loratadine 10 milliGRAM(s) Oral daily  montelukast 10 milliGRAM(s) Oral daily  pantoprazole    Tablet 40 milliGRAM(s) Oral before breakfast  predniSONE   Tablet 60 milliGRAM(s) Oral every 24 hours  valACYclovir 1000 milliGRAM(s) Oral daily    MEDICATIONS  (PRN):  albuterol    90 MICROgram(s) HFA Inhaler 2 Puff(s) Inhalation every 6 hours PRN Shortness of Breath and/or Wheezing  dextrose Oral Gel 15 Gram(s) Oral once PRN Blood Glucose LESS THAN 70 milliGRAM(s)/deciliter      X-Rays reviewed:

## 2024-05-30 LAB
ALBUMIN SERPL ELPH-MCNC: 3.3 G/DL — LOW (ref 3.5–5.2)
ALBUMIN SERPL ELPH-MCNC: 3.7 G/DL — SIGNIFICANT CHANGE UP (ref 3.5–5.2)
ALP SERPL-CCNC: 113 U/L — SIGNIFICANT CHANGE UP (ref 30–115)
ALP SERPL-CCNC: 115 U/L — SIGNIFICANT CHANGE UP (ref 30–115)
ALT FLD-CCNC: 170 U/L — HIGH (ref 0–41)
ALT FLD-CCNC: 210 U/L — HIGH (ref 0–41)
ANION GAP SERPL CALC-SCNC: 13 MMOL/L — SIGNIFICANT CHANGE UP (ref 7–14)
ANION GAP SERPL CALC-SCNC: 13 MMOL/L — SIGNIFICANT CHANGE UP (ref 7–14)
APTT BLD: 28.2 SEC — SIGNIFICANT CHANGE UP (ref 27–39.2)
AST SERPL-CCNC: 67 U/L — HIGH (ref 0–41)
AST SERPL-CCNC: 70 U/L — HIGH (ref 0–41)
BASOPHILS # BLD AUTO: 0.02 K/UL — SIGNIFICANT CHANGE UP (ref 0–0.2)
BASOPHILS # BLD AUTO: 0.03 K/UL — SIGNIFICANT CHANGE UP (ref 0–0.2)
BASOPHILS NFR BLD AUTO: 0.2 % — SIGNIFICANT CHANGE UP (ref 0–1)
BASOPHILS NFR BLD AUTO: 0.4 % — SIGNIFICANT CHANGE UP (ref 0–1)
BILIRUB SERPL-MCNC: <0.2 MG/DL — SIGNIFICANT CHANGE UP (ref 0.2–1.2)
BILIRUB SERPL-MCNC: <0.2 MG/DL — SIGNIFICANT CHANGE UP (ref 0.2–1.2)
BUN SERPL-MCNC: 16 MG/DL — SIGNIFICANT CHANGE UP (ref 10–20)
BUN SERPL-MCNC: 16 MG/DL — SIGNIFICANT CHANGE UP (ref 10–20)
CALCIUM SERPL-MCNC: 8.7 MG/DL — SIGNIFICANT CHANGE UP (ref 8.4–10.5)
CALCIUM SERPL-MCNC: 9.3 MG/DL — SIGNIFICANT CHANGE UP (ref 8.4–10.5)
CHLORIDE SERPL-SCNC: 103 MMOL/L — SIGNIFICANT CHANGE UP (ref 98–110)
CHLORIDE SERPL-SCNC: 105 MMOL/L — SIGNIFICANT CHANGE UP (ref 98–110)
CO2 SERPL-SCNC: 19 MMOL/L — SIGNIFICANT CHANGE UP (ref 17–32)
CO2 SERPL-SCNC: 20 MMOL/L — SIGNIFICANT CHANGE UP (ref 17–32)
CREAT SERPL-MCNC: 0.6 MG/DL — LOW (ref 0.7–1.5)
CREAT SERPL-MCNC: 0.7 MG/DL — SIGNIFICANT CHANGE UP (ref 0.7–1.5)
EGFR: 116 ML/MIN/1.73M2 — SIGNIFICANT CHANGE UP
EGFR: 120 ML/MIN/1.73M2 — SIGNIFICANT CHANGE UP
EOSINOPHIL # BLD AUTO: 0.06 K/UL — SIGNIFICANT CHANGE UP (ref 0–0.7)
EOSINOPHIL # BLD AUTO: 0.17 K/UL — SIGNIFICANT CHANGE UP (ref 0–0.7)
EOSINOPHIL NFR BLD AUTO: 0.6 % — SIGNIFICANT CHANGE UP (ref 0–8)
EOSINOPHIL NFR BLD AUTO: 2 % — SIGNIFICANT CHANGE UP (ref 0–8)
FETAL FRACTION: NORMAL
GENDER OF FETUS: NORMAL
GLUCOSE BLDC GLUCOMTR-MCNC: 130 MG/DL — HIGH (ref 70–99)
GLUCOSE BLDC GLUCOMTR-MCNC: 143 MG/DL — HIGH (ref 70–99)
GLUCOSE BLDC GLUCOMTR-MCNC: 176 MG/DL — HIGH (ref 70–99)
GLUCOSE BLDC GLUCOMTR-MCNC: 220 MG/DL — HIGH (ref 70–99)
GLUCOSE SERPL-MCNC: 139 MG/DL — HIGH (ref 70–99)
GLUCOSE SERPL-MCNC: 182 MG/DL — HIGH (ref 70–99)
HCT VFR BLD CALC: 37.4 % — SIGNIFICANT CHANGE UP (ref 37–47)
HCT VFR BLD CALC: 40.9 % — SIGNIFICANT CHANGE UP (ref 37–47)
HGB BLD-MCNC: 12.1 G/DL — SIGNIFICANT CHANGE UP (ref 12–16)
HGB BLD-MCNC: 13.2 G/DL — SIGNIFICANT CHANGE UP (ref 12–16)
IMM GRANULOCYTES NFR BLD AUTO: 0.6 % — HIGH (ref 0.1–0.3)
IMM GRANULOCYTES NFR BLD AUTO: 0.7 % — HIGH (ref 0.1–0.3)
INR BLD: 0.88 RATIO — SIGNIFICANT CHANGE UP (ref 0.65–1.3)
LYMPHOCYTES # BLD AUTO: 2.15 K/UL — SIGNIFICANT CHANGE UP (ref 1.2–3.4)
LYMPHOCYTES # BLD AUTO: 2.52 K/UL — SIGNIFICANT CHANGE UP (ref 1.2–3.4)
LYMPHOCYTES # BLD AUTO: 20.3 % — LOW (ref 20.5–51.1)
LYMPHOCYTES # BLD AUTO: 30.1 % — SIGNIFICANT CHANGE UP (ref 20.5–51.1)
MCHC RBC-ENTMCNC: 26.5 PG — LOW (ref 27–31)
MCHC RBC-ENTMCNC: 27.2 PG — SIGNIFICANT CHANGE UP (ref 27–31)
MCHC RBC-ENTMCNC: 32.3 G/DL — SIGNIFICANT CHANGE UP (ref 32–37)
MCHC RBC-ENTMCNC: 32.4 G/DL — SIGNIFICANT CHANGE UP (ref 32–37)
MCV RBC AUTO: 82.1 FL — SIGNIFICANT CHANGE UP (ref 81–99)
MCV RBC AUTO: 84 FL — SIGNIFICANT CHANGE UP (ref 81–99)
MONOCYTES # BLD AUTO: 0.44 K/UL — SIGNIFICANT CHANGE UP (ref 0.1–0.6)
MONOCYTES # BLD AUTO: 0.47 K/UL — SIGNIFICANT CHANGE UP (ref 0.1–0.6)
MONOCYTES NFR BLD AUTO: 4.2 % — SIGNIFICANT CHANGE UP (ref 1.7–9.3)
MONOCYTES NFR BLD AUTO: 5.6 % — SIGNIFICANT CHANGE UP (ref 1.7–9.3)
MONOSOMY X AGE-BASED RISK: NORMAL
MONOSOMY X RESULT: NORMAL
MONOSOMY X RISK AFTER TEST: NORMAL
NEUTROPHILS # BLD AUTO: 5.14 K/UL — SIGNIFICANT CHANGE UP (ref 1.4–6.5)
NEUTROPHILS # BLD AUTO: 7.85 K/UL — HIGH (ref 1.4–6.5)
NEUTROPHILS NFR BLD AUTO: 61.3 % — SIGNIFICANT CHANGE UP (ref 42.2–75.2)
NEUTROPHILS NFR BLD AUTO: 74 % — SIGNIFICANT CHANGE UP (ref 42.2–75.2)
NRBC # BLD: 0 /100 WBCS — SIGNIFICANT CHANGE UP (ref 0–0)
NRBC # BLD: 0 /100 WBCS — SIGNIFICANT CHANGE UP (ref 0–0)
PANORAMA SCREEN: NORMAL
PLATELET # BLD AUTO: 252 K/UL — SIGNIFICANT CHANGE UP (ref 130–400)
PLATELET # BLD AUTO: 283 K/UL — SIGNIFICANT CHANGE UP (ref 130–400)
PMV BLD: 10 FL — SIGNIFICANT CHANGE UP (ref 7.4–10.4)
PMV BLD: 10.1 FL — SIGNIFICANT CHANGE UP (ref 7.4–10.4)
POTASSIUM SERPL-MCNC: 4 MMOL/L — SIGNIFICANT CHANGE UP (ref 3.5–5)
POTASSIUM SERPL-MCNC: 4.3 MMOL/L — SIGNIFICANT CHANGE UP (ref 3.5–5)
POTASSIUM SERPL-SCNC: 4 MMOL/L — SIGNIFICANT CHANGE UP (ref 3.5–5)
POTASSIUM SERPL-SCNC: 4.3 MMOL/L — SIGNIFICANT CHANGE UP (ref 3.5–5)
PROT SERPL-MCNC: 5.9 G/DL — LOW (ref 6–8)
PROT SERPL-MCNC: 6.5 G/DL — SIGNIFICANT CHANGE UP (ref 6–8)
PROTHROM AB SERPL-ACNC: 10 SEC — SIGNIFICANT CHANGE UP (ref 9.95–12.87)
RBC # BLD: 4.45 M/UL — SIGNIFICANT CHANGE UP (ref 4.2–5.4)
RBC # BLD: 4.98 M/UL — SIGNIFICANT CHANGE UP (ref 4.2–5.4)
RBC # FLD: 14.4 % — SIGNIFICANT CHANGE UP (ref 11.5–14.5)
RBC # FLD: 14.4 % — SIGNIFICANT CHANGE UP (ref 11.5–14.5)
REPRODUCTIVE CARRIER MULTIGENE ANL BLD/T: NORMAL
RPT COMMENT: NORMAL
SODIUM SERPL-SCNC: 135 MMOL/L — SIGNIFICANT CHANGE UP (ref 135–146)
SODIUM SERPL-SCNC: 138 MMOL/L — SIGNIFICANT CHANGE UP (ref 135–146)
TEST PERFORMANCE INFO SPEC: NORMAL
TRIPLOIDY RESULT: NORMAL
TRISOMY 13 AGE-BASED RISK: NORMAL
TRISOMY 13 RESULT: NORMAL
TRISOMY 13 RISK AFTER TEST: NORMAL
TRISOMY 18 AGE-BASED RISK: NORMAL
TRISOMY 18 RESULT: NORMAL
TRISOMY 18 RISK AFTER TEST: NORMAL
TRISOMY 21 RESULT: NORMAL
TRISOMY 21 RISK AFTER TEST: NORMAL
WBC # BLD: 10.59 K/UL — SIGNIFICANT CHANGE UP (ref 4.8–10.8)
WBC # BLD: 8.38 K/UL — SIGNIFICANT CHANGE UP (ref 4.8–10.8)
WBC # FLD AUTO: 10.59 K/UL — SIGNIFICANT CHANGE UP (ref 4.8–10.8)
WBC # FLD AUTO: 8.38 K/UL — SIGNIFICANT CHANGE UP (ref 4.8–10.8)

## 2024-05-30 PROCEDURE — 76819 FETAL BIOPHYS PROFIL W/O NST: CPT | Mod: 26,59

## 2024-05-30 PROCEDURE — 76705 ECHO EXAM OF ABDOMEN: CPT | Mod: 26

## 2024-05-30 PROCEDURE — 99233 SBSQ HOSP IP/OBS HIGH 50: CPT | Mod: 25

## 2024-05-30 RX ORDER — INSULIN GLARGINE 100 [IU]/ML
36 INJECTION, SOLUTION SUBCUTANEOUS AT BEDTIME
Refills: 0 | Status: DISCONTINUED | OUTPATIENT
Start: 2024-05-30 | End: 2024-06-01

## 2024-05-30 RX ADMIN — Medication 7 UNIT(S): at 11:21

## 2024-05-30 RX ADMIN — Medication 3 MILLILITER(S): at 08:22

## 2024-05-30 RX ADMIN — BUDESONIDE AND FORMOTEROL FUMARATE DIHYDRATE 2 PUFF(S): 160; 4.5 AEROSOL RESPIRATORY (INHALATION) at 08:39

## 2024-05-30 RX ADMIN — MONTELUKAST 10 MILLIGRAM(S): 4 TABLET, CHEWABLE ORAL at 11:22

## 2024-05-30 RX ADMIN — Medication 7 UNIT(S): at 08:36

## 2024-05-30 RX ADMIN — Medication 81 MILLIGRAM(S): at 11:23

## 2024-05-30 RX ADMIN — AZITHROMYCIN 250 MILLIGRAM(S): 500 TABLET, FILM COATED ORAL at 05:09

## 2024-05-30 RX ADMIN — PANTOPRAZOLE SODIUM 40 MILLIGRAM(S): 20 TABLET, DELAYED RELEASE ORAL at 05:09

## 2024-05-30 RX ADMIN — Medication 3 MILLILITER(S): at 14:29

## 2024-05-30 RX ADMIN — LORATADINE 10 MILLIGRAM(S): 10 TABLET ORAL at 11:23

## 2024-05-30 RX ADMIN — Medication 3 MILLILITER(S): at 20:18

## 2024-05-30 RX ADMIN — INSULIN GLARGINE 28 UNIT(S): 100 INJECTION, SOLUTION SUBCUTANEOUS at 09:13

## 2024-05-30 RX ADMIN — VALACYCLOVIR 1000 MILLIGRAM(S): 500 TABLET, FILM COATED ORAL at 11:23

## 2024-05-30 RX ADMIN — Medication 9 UNIT(S): at 17:39

## 2024-05-30 RX ADMIN — INSULIN GLARGINE 36 UNIT(S): 100 INJECTION, SOLUTION SUBCUTANEOUS at 23:44

## 2024-05-30 RX ADMIN — BUDESONIDE AND FORMOTEROL FUMARATE DIHYDRATE 2 PUFF(S): 160; 4.5 AEROSOL RESPIRATORY (INHALATION) at 21:39

## 2024-05-30 RX ADMIN — Medication 60 MILLIGRAM(S): at 01:00

## 2024-05-30 NOTE — PROGRESS NOTE ADULT - ASSESSMENT
Ms. Negro is a 34yo  @29w1d, with shortness of breath, likely asthma exacerbation low suspicion for PE/VTE, complicated by poorly controlled T2DM, LGA fetus, HSV with outbreak in pregnancy.    #Shortness of breath  - Asthma exacerbation likely 2/2 to URI  - Less likey DVT due to preliminarily negative LE Dopplers  - Less likely influenza or COVID due to negative PCR  - Chest x ray was clear    Likely asthma exacerbation.  - REGIMEN: PO Prednisone 60mg qd, symbicort 160 mcg BID, singular 10qd, claritin 10 mg qd, albuterol PRN, duonebs q6h, protonix 40mg daily  - Vitals per routine, continuous pulse-ox  - Daily peak flow  - s/p pulm consult    #T2DM, poorly controlled   - FS, 2PP  - Current insulin regimen: Glargine 28U AM/ 32U PM, lispro   - Fingersticks may rise due to Prednisone.  - Cardiology as an outpatient.    #HSV (+)   - c/w valtrex 1 gm daily  - reports last genital outbreak was March    #Obesity - BMI 41  - c/w ASA 81 mg    #AST/ALT elevated  - Will monitor.    #Rh NEG  - Received rhogam 24 due to bleeding  - Follow up [facility] cell free fetal DNA screening for Rh.    #elevated liver enzymes  -f/u hepatitis panel  -RUQ u/s  -labs q12    #pregnancy  -PNV Ms. Negro is a 34yo  @29w1d, with shortness of breath, likely asthma exacerbation low suspicion for PE/VTE, complicated by poorly controlled T2DM, LGA fetus, HSV with outbreak in pregnancy.    #Shortness of breath  - Asthma exacerbation likely 2/2 to URI  - Less likely DVT due to preliminarily negative LE Dopplers  - Less likely influenza or COVID due to negative PCR  - Chest x ray was clear    Likely asthma exacerbation.  - REGIMEN: PO Prednisone 60mg qd, symbicort 160 mcg BID, singular 10qd, claritin 10 mg qd, albuterol PRN, duonebs q6h, protonix 40mg daily  - Vitals per routine, continuous pulse-ox  - Daily peak flow  - s/p pulm consult    #T2DM, poorly controlled   - FS, 2PP  - Current insulin regimen: Glargine 28U AM/ 32U PM, lispro   - Fingersticks may rise due to Prednisone.  - Cardiology as an outpatient.    #HSV (+)   - c/w valtrex 1 gm daily  - reports last genital outbreak was March    #Obesity - BMI 41  - c/w ASA 81 mg    #AST/ALT elevated  - Will monitor.    #Rh NEG  - Received rhogam 24 due to bleeding  - Follow up [facility] cell free fetal DNA screening for Rh.    #elevated liver enzymes  -f/u hepatitis panel  -RUQ u/s  -labs q12    #pregnancy  -PNV

## 2024-05-30 NOTE — PROGRESS NOTE ADULT - SUBJECTIVE AND OBJECTIVE BOX
PGY 3 Note  GA: 29.1    Pt seen and examined at bedside. Still complaining of wheezing, however, improved from yesterday. Denies ctx, lof or vaginal bleeding.  Pt denies headache, dizziness, weakness, abdominal pain, palpitations, or diaphoresis.     Ambulating: Yes  Voiding: Yes  Flatus: Yes  Bowel movements: Yes   Diet: Regular    MEDICATIONS  (STANDING):  albuterol/ipratropium for Nebulization 3 milliLiter(s) Nebulizer every 6 hours  aspirin  chewable 81 milliGRAM(s) Oral daily  azithromycin   Tablet 250 milliGRAM(s) Oral every 24 hours  budesonide 160 MICROgram(s)/formoterol 4.5 MICROgram(s) Inhaler 2 Puff(s) Inhalation two times a day  dextrose 10% Bolus 125 milliLiter(s) IV Bolus once  dextrose 5%. 1000 milliLiter(s) (50 mL/Hr) IV Continuous <Continuous>  dextrose 5%. 1000 milliLiter(s) (100 mL/Hr) IV Continuous <Continuous>  dextrose 50% Injectable 12.5 Gram(s) IV Push once  dextrose 50% Injectable 25 Gram(s) IV Push once  glucagon  Injectable 1 milliGRAM(s) IntraMuscular once  insulin glargine Injectable (LANTUS) 32 Unit(s) SubCutaneous at bedtime  insulin glargine Injectable (LANTUS) 28 Unit(s) SubCutaneous every morning  insulin lispro Injectable (ADMELOG) 7 Unit(s) SubCutaneous before breakfast  insulin lispro Injectable (ADMELOG) 7 Unit(s) SubCutaneous before lunch  insulin lispro Injectable (ADMELOG) 9 Unit(s) SubCutaneous before dinner  loratadine 10 milliGRAM(s) Oral daily  montelukast 10 milliGRAM(s) Oral daily  pantoprazole    Tablet 40 milliGRAM(s) Oral before breakfast  predniSONE   Tablet 60 milliGRAM(s) Oral every 24 hours  valACYclovir 1000 milliGRAM(s) Oral daily    MEDICATIONS  (PRN):  albuterol    90 MICROgram(s) HFA Inhaler 2 Puff(s) Inhalation every 6 hours PRN Shortness of Breath and/or Wheezing  dextrose Oral Gel 15 Gram(s) Oral once PRN Blood Glucose LESS THAN 70 milliGRAM(s)/deciliter    PAST MEDICAL & SURGICAL HISTORY:  Asthma  Diabetes mellitus, type 2  Eczema  PCOS (polycystic ovarian syndrome)  Rh negative status during pregnancy  Rosacea  No significant past surgical history    Physical Exam:   Vital Signs Last 24 Hrs  T(C): 36.7 (30 May 2024 08:18), Max: 36.7 (30 May 2024 04:58)  T(F): 98.1 (30 May 2024 08:18), Max: 98.1 (30 May 2024 04:58)  HR: 88 (30 May 2024 08:18) (87 - 97)  BP: 123/70 (30 May 2024 08:18) (109/56 - 123/70)  RR: 18 (30 May 2024 08:18) (18 - 18)  SpO2: 96% (30 May 2024 08:18) (96% - 99%)      Gen: AOx3, NAD   Cardio: RRR, S1S2, no m/r/g  Pulm: CTA b/l  Abdomen: soft, gravid, nontender, no palpable contractions   MSK: normal passive and active range of motion   Neuro: CN2-12 intact   Extremities: no swelling or erythema noted   Psych: normal mood and affect, no suicidal or homicidal ideations   BSS: MVP 6.21, , vertex, BPP 8/8    Labs:                        12.1   8.38  )-----------( 252      ( 30 May 2024 06:08 )             37.4                         12.7   9.17  )-----------( 251      ( 29 May 2024 06:17 )             39.5        CAPILLARY BLOOD GLUCOSE      POCT Blood Glucose.: 220 mg/dL (30 May 2024 11:13)  POCT Blood Glucose.: 143 mg/dL (30 May 2024 08:01)  POCT Blood Glucose.: 137 mg/dL (29 May 2024 20:47)  POCT Blood Glucose.: 184 mg/dL (29 May 2024 18:51)  POCT Blood Glucose.: 97 mg/dL (29 May 2024 16:47)       PGY 3 Note  GA: 29.1    Pt seen and examined at bedside. Still complaining of wheezing, however, improved from yesterday. Denies ctx, lof or vaginal bleeding.  Pt denies headache, dizziness, weakness, abdominal pain, palpitations, or diaphoresis.     Ambulating: Yes  Voiding: Yes  Flatus: Yes  Bowel movements: Yes   Diet: Regular    MEDICATIONS  (STANDING):  albuterol/ipratropium for Nebulization 3 milliLiter(s) Nebulizer every 6 hours  aspirin  chewable 81 milliGRAM(s) Oral daily  azithromycin   Tablet 250 milliGRAM(s) Oral every 24 hours  budesonide 160 MICROgram(s)/formoterol 4.5 MICROgram(s) Inhaler 2 Puff(s) Inhalation two times a day  dextrose 10% Bolus 125 milliLiter(s) IV Bolus once  dextrose 5%. 1000 milliLiter(s) (50 mL/Hr) IV Continuous <Continuous>  dextrose 5%. 1000 milliLiter(s) (100 mL/Hr) IV Continuous <Continuous>  dextrose 50% Injectable 12.5 Gram(s) IV Push once  dextrose 50% Injectable 25 Gram(s) IV Push once  glucagon  Injectable 1 milliGRAM(s) IntraMuscular once  insulin glargine Injectable (LANTUS) 32 Unit(s) SubCutaneous at bedtime  insulin glargine Injectable (LANTUS) 28 Unit(s) SubCutaneous every morning  insulin lispro Injectable (ADMELOG) 7 Unit(s) SubCutaneous before breakfast  insulin lispro Injectable (ADMELOG) 7 Unit(s) SubCutaneous before lunch  insulin lispro Injectable (ADMELOG) 9 Unit(s) SubCutaneous before dinner  loratadine 10 milliGRAM(s) Oral daily  montelukast 10 milliGRAM(s) Oral daily  pantoprazole    Tablet 40 milliGRAM(s) Oral before breakfast  predniSONE   Tablet 60 milliGRAM(s) Oral every 24 hours  valACYclovir 1000 milliGRAM(s) Oral daily    MEDICATIONS  (PRN):  albuterol    90 MICROgram(s) HFA Inhaler 2 Puff(s) Inhalation every 6 hours PRN Shortness of Breath and/or Wheezing  dextrose Oral Gel 15 Gram(s) Oral once PRN Blood Glucose LESS THAN 70 milliGRAM(s)/deciliter    PAST MEDICAL & SURGICAL HISTORY:  Asthma  Diabetes mellitus, type 2  Eczema  PCOS (polycystic ovarian syndrome)  Rh negative status during pregnancy  Rosacea  No significant past surgical history    Physical Exam:   Vital Signs Last 24 Hrs  T(C): 36.7 (30 May 2024 08:18), Max: 36.7 (30 May 2024 04:58)  T(F): 98.1 (30 May 2024 08:18), Max: 98.1 (30 May 2024 04:58)  HR: 88 (30 May 2024 08:18) (87 - 97)  BP: 123/70 (30 May 2024 08:18) (109/56 - 123/70)  RR: 18 (30 May 2024 08:18) (18 - 18)  SpO2: 96% (30 May 2024 08:18) (96% - 99%)      Gen: AOx3, NAD   Cardio: RRR, S1S2, no m/r/g  Pulm: CTA b/l  Abdomen: soft, gravid, nontender, no palpable contractions   MSK: normal passive and active range of motion   Neuro: CN2-12 intact   Extremities: no swelling or erythema noted   Psych: normal mood and affect, no suicidal or homicidal ideations   BSS: MVP 6.21, , vertex, BPP 8/8                          12.1   8.38  )-----------( 252      ( 30 May 2024 06:08 )             37.4                         12.7   9.17  )-----------( 251      ( 29 May 2024 06:17 )             39.5                         13.0   8.33  )-----------( 256      ( 28 May 2024 05:37 )             41.2       05-30    138  |  105  |  16  ----------------------------<  139  4.0   |  20  |  0.6    Ca    8.7      30 May 2024 06:08   Mg    x   30 May 2024 06:08  Phos    x    30 May 2024 06:08    TPro  5.9  /  Alb  3.3  /  TBili  <0.2  /  DBili  x   /  AST  67  /  ALT  170  /  AlkPhos  113  05-30-24          CAPILLARY BLOOD GLUCOSE      POCT Blood Glucose.: 130 mg/dL (30 May 2024 17:37)  POCT Blood Glucose.: 220 mg/dL (30 May 2024 11:13)  POCT Blood Glucose.: 143 mg/dL (30 May 2024 08:01)  POCT Blood Glucose.: 137 mg/dL (29 May 2024 20:47)

## 2024-05-30 NOTE — PROGRESS NOTE ADULT - ATTENDING COMMENTS
[facility] Staff    I saw and evaluated Ms. TAYLOR NEGRO  with Dr. Snowden.  Ms. TAYLOR NEGRO reports positive fetal movement and no vaginal bleeding.  She feels a little better today compared to yesterday.    General:  Ms. TAYLOR NEGRO is lying in bed.  She appears comfortable.    Vital Signs Last 24 Hrs  T(C): 36.1 (30 May 2024 17:54), Max: 36.7 (30 May 2024 04:58)  T(F): 97 (30 May 2024 17:54), Max: 98.1 (30 May 2024 04:58)  HR: 99 (30 May 2024 17:54) (87 - 99)  BP: 114/70 (30 May 2024 17:54) (109/56 - 123/70)  BP(mean): --  RR: 18 (30 May 2024 17:54) (18 - 18)  SpO2: 98% (30 May 2024 17:54) (96% - 99%)      Pulmonary:  Wheezing.  Abdomen:  Soft, nontender, nondistended, no rebound, no guarding.  Extremities:  Nontender calves.    Labs are above.    Ms. Negro is a 32yo  @29w1, with shortness of breath, likely asthma exacerbation low suspicion for PE/VTE, complicated by poorly controlled T2DM, LGA fetus, HSV with outbreak in pregnancy.    #Shortness of breath  - Less likely DVT due to preliminarily negative LE Dopplers  - Less likely influenza or COVID due to negative PCR  - Chest x ray was clear    Likely asthma exacerbation.  - Vitals per routine,   - Prednisone 60 mg q24h for the time being, consider   - c/w Symbicort and singulair qd   - albuterol prn  - Claritin 10mg qD  - PPI  - Duonebs q6h  - Daily peak flow.  Today was 300.  - Appreciate Pulmonary input.    #T2DM, poorly controlled   - FS q4h  - Current insulin regimen: Glargine 28U AM/ 32U PM, lispro    - New insulin regimen:  Glargine 28U AM/ 36U PM, lispro    - Fingersticks may rise due to Prednisone.  - Cardiology as an outpatient.    #HSV (+)   - c/w valtrex 1 gm daily  - reports last genital outbreak was March    #Obesity - BMI 41  - c/w ASA 81 mg    #AST/ALT elevated  - AST/ALT 60/170  - Repeat this evening and tomorrow morning.  - Check coagulation profile and hepatitis panel.  - RUQ ultrasound      #Rh NEG  - Received rhogam 24 due to bleeding  - Follow up [facility] cell free fetal DNA screening for Rh.    Reza Sheppard MD [facility] Staff    I saw and evaluated Ms. TAYLOR NEGRO  with Dr. Snowden.  Ms. TAYLOR NEGRO reports positive fetal movement and no vaginal bleeding.  She feels a little better today compared to yesterday.    General:  Ms. TAYLOR NEGRO is lying in bed.  She appears comfortable.    Vital Signs Last 24 Hrs  T(C): 36.1 (30 May 2024 17:54), Max: 36.7 (30 May 2024 04:58)  T(F): 97 (30 May 2024 17:54), Max: 98.1 (30 May 2024 04:58)  HR: 99 (30 May 2024 17:54) (87 - 99)  BP: 114/70 (30 May 2024 17:54) (109/56 - 123/70)  BP(mean): --  RR: 18 (30 May 2024 17:54) (18 - 18)  SpO2: 98% (30 May 2024 17:54) (96% - 99%)      Pulmonary:  Wheezing.  Abdomen:  Soft, nontender, nondistended, no rebound, no guarding.  Extremities:  Nontender calves.    Labs are above.    Ms. Negro is a 34yo  @29w1, with shortness of breath, likely asthma exacerbation low suspicion for PE/VTE, complicated by poorly controlled T2DM, LGA fetus, HSV with outbreak in pregnancy.    #Shortness of breath  - Less likely DVT due to preliminarily negative LE Dopplers  - Less likely influenza or COVID due to negative PCR  - Chest x ray was clear    Likely asthma exacerbation.  - Vitals per routine,   - Prednisone 60 mg q24h for the time being, consider   - c/w Symbicort and singulair qd   - albuterol prn  - Claritin 10mg qD  - PPI  - Duonebs q6h  - On Azithromycin.  - Daily peak flow.  Today was 300.  - Appreciate Pulmonary input.    #T2DM, poorly controlled   - FS q4h  - Current insulin regimen: Glargine 28U AM/ 32U PM, lispro    - New insulin regimen:  Glargine 28U AM/ 36U PM, lispro    - Fingersticks may rise due to Prednisone.  - Cardiology as an outpatient.    #HSV (+)   - c/w valtrex 1 gm daily  - reports last genital outbreak was March    #Obesity - BMI 41  - c/w ASA 81 mg    #AST/ALT elevated  - AST/ALT 60/170  - Repeat this evening and tomorrow morning.  - Check coagulation profile and hepatitis panel.  - RUQ ultrasound      #Rh NEG  - Received rhogam 24 due to bleeding  - Follow up [facility] cell free fetal DNA screening for Rh.    Reza Sheppard MD

## 2024-05-30 NOTE — PROGRESS NOTE ADULT - SUBJECTIVE AND OBJECTIVE BOX
[facility] Procedure note    Asthma, shortness of breath    Bedside ultrasound: MVP 6.21, , vertex, BPP 8/8.    Reassuring  testing.    Reza Sheppard MD

## 2024-05-31 ENCOUNTER — APPOINTMENT (OUTPATIENT)
Dept: ANTEPARTUM | Facility: CLINIC | Age: 34
End: 2024-05-31

## 2024-05-31 ENCOUNTER — NON-APPOINTMENT (OUTPATIENT)
Age: 34
End: 2024-05-31

## 2024-05-31 LAB
ALBUMIN SERPL ELPH-MCNC: 3.4 G/DL — LOW (ref 3.5–5.2)
ALP SERPL-CCNC: 100 U/L — SIGNIFICANT CHANGE UP (ref 30–115)
ALT FLD-CCNC: 190 U/L — HIGH (ref 0–41)
ANION GAP SERPL CALC-SCNC: 11 MMOL/L — SIGNIFICANT CHANGE UP (ref 7–14)
AST SERPL-CCNC: 56 U/L — HIGH (ref 0–41)
BASOPHILS # BLD AUTO: 0.02 K/UL — SIGNIFICANT CHANGE UP (ref 0–0.2)
BASOPHILS NFR BLD AUTO: 0.2 % — SIGNIFICANT CHANGE UP (ref 0–1)
BILIRUB SERPL-MCNC: <0.2 MG/DL — SIGNIFICANT CHANGE UP (ref 0.2–1.2)
BUN SERPL-MCNC: 17 MG/DL — SIGNIFICANT CHANGE UP (ref 10–20)
CALCIUM SERPL-MCNC: 8.7 MG/DL — SIGNIFICANT CHANGE UP (ref 8.4–10.4)
CHLORIDE SERPL-SCNC: 105 MMOL/L — SIGNIFICANT CHANGE UP (ref 98–110)
CO2 SERPL-SCNC: 22 MMOL/L — SIGNIFICANT CHANGE UP (ref 17–32)
CREAT SERPL-MCNC: 0.5 MG/DL — LOW (ref 0.7–1.5)
EGFR: 125 ML/MIN/1.73M2 — SIGNIFICANT CHANGE UP
EOSINOPHIL # BLD AUTO: 0.03 K/UL — SIGNIFICANT CHANGE UP (ref 0–0.7)
EOSINOPHIL NFR BLD AUTO: 0.2 % — SIGNIFICANT CHANGE UP (ref 0–8)
GLUCOSE BLDC GLUCOMTR-MCNC: 118 MG/DL — HIGH (ref 70–99)
GLUCOSE BLDC GLUCOMTR-MCNC: 121 MG/DL — HIGH (ref 70–99)
GLUCOSE BLDC GLUCOMTR-MCNC: 170 MG/DL — HIGH (ref 70–99)
GLUCOSE BLDC GLUCOMTR-MCNC: 178 MG/DL — HIGH (ref 70–99)
GLUCOSE SERPL-MCNC: 155 MG/DL — HIGH (ref 70–99)
HCT VFR BLD CALC: 38.2 % — SIGNIFICANT CHANGE UP (ref 37–47)
HGB BLD-MCNC: 12.4 G/DL — SIGNIFICANT CHANGE UP (ref 12–16)
IMM GRANULOCYTES NFR BLD AUTO: 0.6 % — HIGH (ref 0.1–0.3)
LYMPHOCYTES # BLD AUTO: 1.75 K/UL — SIGNIFICANT CHANGE UP (ref 1.2–3.4)
LYMPHOCYTES # BLD AUTO: 14.2 % — LOW (ref 20.5–51.1)
MCHC RBC-ENTMCNC: 26.7 PG — LOW (ref 27–31)
MCHC RBC-ENTMCNC: 32.5 G/DL — SIGNIFICANT CHANGE UP (ref 32–37)
MCV RBC AUTO: 82.3 FL — SIGNIFICANT CHANGE UP (ref 81–99)
MONOCYTES # BLD AUTO: 0.3 K/UL — SIGNIFICANT CHANGE UP (ref 0.1–0.6)
MONOCYTES NFR BLD AUTO: 2.4 % — SIGNIFICANT CHANGE UP (ref 1.7–9.3)
NEUTROPHILS # BLD AUTO: 10.17 K/UL — HIGH (ref 1.4–6.5)
NEUTROPHILS NFR BLD AUTO: 82.4 % — HIGH (ref 42.2–75.2)
NRBC # BLD: 0 /100 WBCS — SIGNIFICANT CHANGE UP (ref 0–0)
PLATELET # BLD AUTO: 258 K/UL — SIGNIFICANT CHANGE UP (ref 130–400)
PMV BLD: 9.9 FL — SIGNIFICANT CHANGE UP (ref 7.4–10.4)
POTASSIUM SERPL-MCNC: 4.5 MMOL/L — SIGNIFICANT CHANGE UP (ref 3.5–5)
POTASSIUM SERPL-SCNC: 4.5 MMOL/L — SIGNIFICANT CHANGE UP (ref 3.5–5)
PROT SERPL-MCNC: 6 G/DL — SIGNIFICANT CHANGE UP (ref 6–8)
RBC # BLD: 4.64 M/UL — SIGNIFICANT CHANGE UP (ref 4.2–5.4)
RBC # FLD: 14.4 % — SIGNIFICANT CHANGE UP (ref 11.5–14.5)
SODIUM SERPL-SCNC: 138 MMOL/L — SIGNIFICANT CHANGE UP (ref 135–146)
WBC # BLD: 12.35 K/UL — HIGH (ref 4.8–10.8)
WBC # FLD AUTO: 12.35 K/UL — HIGH (ref 4.8–10.8)

## 2024-05-31 PROCEDURE — 99233 SBSQ HOSP IP/OBS HIGH 50: CPT

## 2024-05-31 RX ORDER — PANTOPRAZOLE SODIUM 20 MG/1
40 TABLET, DELAYED RELEASE ORAL
Refills: 0 | Status: DISCONTINUED | OUTPATIENT
Start: 2024-05-31 | End: 2024-06-05

## 2024-05-31 RX ORDER — VALACYCLOVIR 500 MG/1
1000 TABLET, FILM COATED ORAL DAILY
Refills: 0 | Status: DISCONTINUED | OUTPATIENT
Start: 2024-05-31 | End: 2024-05-31

## 2024-05-31 RX ADMIN — AZITHROMYCIN 250 MILLIGRAM(S): 500 TABLET, FILM COATED ORAL at 05:30

## 2024-05-31 RX ADMIN — Medication 1 TABLET(S): at 13:27

## 2024-05-31 RX ADMIN — BUDESONIDE AND FORMOTEROL FUMARATE DIHYDRATE 2 PUFF(S): 160; 4.5 AEROSOL RESPIRATORY (INHALATION) at 11:15

## 2024-05-31 RX ADMIN — MONTELUKAST 10 MILLIGRAM(S): 4 TABLET, CHEWABLE ORAL at 11:26

## 2024-05-31 RX ADMIN — Medication 3 MILLILITER(S): at 01:30

## 2024-05-31 RX ADMIN — VALACYCLOVIR 1000 MILLIGRAM(S): 500 TABLET, FILM COATED ORAL at 13:28

## 2024-05-31 RX ADMIN — Medication 7 UNIT(S): at 08:26

## 2024-05-31 RX ADMIN — INSULIN GLARGINE 36 UNIT(S): 100 INJECTION, SOLUTION SUBCUTANEOUS at 22:03

## 2024-05-31 RX ADMIN — Medication 3 MILLILITER(S): at 14:58

## 2024-05-31 RX ADMIN — BUDESONIDE AND FORMOTEROL FUMARATE DIHYDRATE 2 PUFF(S): 160; 4.5 AEROSOL RESPIRATORY (INHALATION) at 22:03

## 2024-05-31 RX ADMIN — Medication 9 UNIT(S): at 17:59

## 2024-05-31 RX ADMIN — Medication 60 MILLIGRAM(S): at 02:11

## 2024-05-31 RX ADMIN — Medication 3 MILLILITER(S): at 09:01

## 2024-05-31 RX ADMIN — Medication 3 MILLILITER(S): at 20:24

## 2024-05-31 RX ADMIN — Medication 81 MILLIGRAM(S): at 11:27

## 2024-05-31 RX ADMIN — PANTOPRAZOLE SODIUM 40 MILLIGRAM(S): 20 TABLET, DELAYED RELEASE ORAL at 05:30

## 2024-05-31 RX ADMIN — LORATADINE 10 MILLIGRAM(S): 10 TABLET ORAL at 13:27

## 2024-05-31 RX ADMIN — Medication 7 UNIT(S): at 11:29

## 2024-05-31 RX ADMIN — INSULIN GLARGINE 28 UNIT(S): 100 INJECTION, SOLUTION SUBCUTANEOUS at 08:25

## 2024-05-31 NOTE — PROGRESS NOTE ADULT - SUBJECTIVE AND OBJECTIVE BOX
PGY 3 Note  GA: 29.2    Pt seen and examined at bedside. Still complaining of wheezing, same as yesterday. Denies ctx, lof or vaginal bleeding.  Pt denies headache, dizziness, weakness, abdominal pain, palpitations, or diaphoresis.     Ambulating: Yes  Voiding: Yes  Flatus: Yes  Bowel movements: Yes   Diet: Regular    MEDICATIONS  (STANDING):  albuterol/ipratropium for Nebulization 3 milliLiter(s) Nebulizer every 6 hours  aspirin  chewable 81 milliGRAM(s) Oral daily  azithromycin   Tablet 250 milliGRAM(s) Oral every 24 hours  budesonide 160 MICROgram(s)/formoterol 4.5 MICROgram(s) Inhaler 2 Puff(s) Inhalation two times a day  dextrose 10% Bolus 125 milliLiter(s) IV Bolus once  dextrose 5%. 1000 milliLiter(s) (100 mL/Hr) IV Continuous <Continuous>  dextrose 5%. 1000 milliLiter(s) (50 mL/Hr) IV Continuous <Continuous>  dextrose 50% Injectable 25 Gram(s) IV Push once  dextrose 50% Injectable 12.5 Gram(s) IV Push once  glucagon  Injectable 1 milliGRAM(s) IntraMuscular once  insulin glargine Injectable (LANTUS) 36 Unit(s) SubCutaneous at bedtime  insulin glargine Injectable (LANTUS) 28 Unit(s) SubCutaneous every morning  insulin lispro Injectable (ADMELOG) 7 Unit(s) SubCutaneous before lunch  insulin lispro Injectable (ADMELOG) 9 Unit(s) SubCutaneous before dinner  insulin lispro Injectable (ADMELOG) 7 Unit(s) SubCutaneous before breakfast  loratadine 10 milliGRAM(s) Oral daily  montelukast 10 milliGRAM(s) Oral daily  pantoprazole    Tablet 40 milliGRAM(s) Oral before breakfast  predniSONE   Tablet 60 milliGRAM(s) Oral every 24 hours  prenatal multivitamin 1 Tablet(s) Oral daily  valACYclovir 1000 milliGRAM(s) Oral daily    MEDICATIONS  (PRN):  albuterol    90 MICROgram(s) HFA Inhaler 2 Puff(s) Inhalation every 6 hours PRN Shortness of Breath and/or Wheezing  dextrose Oral Gel 15 Gram(s) Oral once PRN Blood Glucose LESS THAN 70 milliGRAM(s)/deciliter      PAST MEDICAL & SURGICAL HISTORY:  Asthma  Diabetes mellitus, type 2  Eczema  PCOS (polycystic ovarian syndrome)  Rh negative status during pregnancy  Rosacea  No significant past surgical history    Physical Exam:   Vital Signs Last 24 Hrs  T(C): 36.8 (31 May 2024 00:18), Max: 36.8 (31 May 2024 00:18)  T(F): 98.2 (31 May 2024 00:18), Max: 98.2 (31 May 2024 00:18)  HR: 103 (31 May 2024 00:18) (88 - 103)  BP: 101/62 (31 May 2024 00:18) (101/62 - 123/70)  RR: 18 (31 May 2024 00:18) (18 - 18)  SpO2: 98% (31 May 2024 00:18) (96% - 98%)      Gen: AOx3, NAD   Cardio: RRR, S1S2, no m/r/g  Pulm: bilateral wheezing in lower lobes  Abdomen: soft, gravid, nontender, no palpable contractions   Extremities: no swelling or erythema noted   Psych: normal mood and affect, no suicidal or homicidal ideations     Labs:                        12.4   12.35 )-----------( 258      ( 31 May 2024 05:49 )             38.2                         13.2   10.59 )-----------( 283      ( 30 May 2024 21:11 )             40.9        Radiology:  < from: US Abdomen Upper Quadrant Right (05.30.24 @ 19:52) >  US ABDOMEN RT UPR QUADRANT   ORDERED BY: CARY TOMPKINS     PROCEDURE DATE:  05/30/2024          INTERPRETATION:  CLINICAL HISTORY / REASON FOR EXAM: Acute transaminitis.    COMPARISON: None    PROCEDURE: Ultrasound of theright upper quadrant was performed.    FINDINGS:    LIVER: Right hepatic lobe echogenic lesion measuring 1.2 x 0.9 cm. Patent   hepatic venous confluence.  BILE DUCTS: Normal caliber. Common bile duct measures 4 mm.  GALLBLADDER: Gallstones. No gallbladder wall thickening. No   pericholecystic fluid. Negative sonographic Ascencio's sign.  PANCREAS: Visualized portions are within normal limits.  RIGHT KIDNEY: 13.1 cm. No hydronephrosis.  ASCITES: None.  IVC: Visualized portions are within normal limits.      IMPRESSION:    Cholelithiasis without sonographic evidence of cholecystitis.    Lesion within the right hepatic lobe measuring up to 1.2 cm, likely   hemangioma. Nonemergent/outpatient follow-up imaging may be obtained for   further characterization.      < end of copied text >   PGY 3 Note  GA: 29.2    Pt seen and examined at bedside. Still complaining of wheezing, same as yesterday. Denies ctx, lof or vaginal bleeding.  Pt denies headache, dizziness, weakness, abdominal pain, palpitations, or diaphoresis.     Ambulating: Yes  Voiding: Yes  Flatus: Yes  Bowel movements: Yes   Diet: Regular    MEDICATIONS  (STANDING):  albuterol/ipratropium for Nebulization 3 milliLiter(s) Nebulizer every 6 hours  aspirin  chewable 81 milliGRAM(s) Oral daily  azithromycin   Tablet 250 milliGRAM(s) Oral every 24 hours  budesonide 160 MICROgram(s)/formoterol 4.5 MICROgram(s) Inhaler 2 Puff(s) Inhalation two times a day  dextrose 10% Bolus 125 milliLiter(s) IV Bolus once  dextrose 5%. 1000 milliLiter(s) (100 mL/Hr) IV Continuous <Continuous>  dextrose 5%. 1000 milliLiter(s) (50 mL/Hr) IV Continuous <Continuous>  dextrose 50% Injectable 25 Gram(s) IV Push once  dextrose 50% Injectable 12.5 Gram(s) IV Push once  glucagon  Injectable 1 milliGRAM(s) IntraMuscular once  insulin glargine Injectable (LANTUS) 36 Unit(s) SubCutaneous at bedtime  insulin glargine Injectable (LANTUS) 28 Unit(s) SubCutaneous every morning  insulin lispro Injectable (ADMELOG) 7 Unit(s) SubCutaneous before lunch  insulin lispro Injectable (ADMELOG) 9 Unit(s) SubCutaneous before dinner  insulin lispro Injectable (ADMELOG) 7 Unit(s) SubCutaneous before breakfast  loratadine 10 milliGRAM(s) Oral daily  montelukast 10 milliGRAM(s) Oral daily  pantoprazole    Tablet 40 milliGRAM(s) Oral before breakfast  predniSONE   Tablet 60 milliGRAM(s) Oral every 24 hours  prenatal multivitamin 1 Tablet(s) Oral daily  valACYclovir 1000 milliGRAM(s) Oral daily    MEDICATIONS  (PRN):  albuterol    90 MICROgram(s) HFA Inhaler 2 Puff(s) Inhalation every 6 hours PRN Shortness of Breath and/or Wheezing  dextrose Oral Gel 15 Gram(s) Oral once PRN Blood Glucose LESS THAN 70 milliGRAM(s)/deciliter      PAST MEDICAL & SURGICAL HISTORY:  Asthma  Diabetes mellitus, type 2  Eczema  PCOS (polycystic ovarian syndrome)  Rh negative status during pregnancy  Rosacea  No significant past surgical history    Physical Exam:   Vital Signs Last 24 Hrs  T(C): 36.8 (31 May 2024 00:18), Max: 36.8 (31 May 2024 00:18)  T(F): 98.2 (31 May 2024 00:18), Max: 98.2 (31 May 2024 00:18)  HR: 103 (31 May 2024 00:18) (88 - 103)  BP: 101/62 (31 May 2024 00:18) (101/62 - 123/70)  RR: 18 (31 May 2024 00:18) (18 - 18)  SpO2: 98% (31 May 2024 00:18) (96% - 98%)      Gen: AOx3, NAD   Cardio: RRR, S1S2, no m/r/g  Pulm: bilateral wheezing in lower lobes  Abdomen: soft, gravid, nontender, no palpable contractions   Extremities: no swelling or erythema noted   Psych: normal mood and affect, no suicidal or homicidal ideations   BSS: BPP 8/8, MVP 5.91, vertex,   Labs:                        12.4   12.35 )-----------( 258      ( 31 May 2024 05:49 )             38.2                         13.2   10.59 )-----------( 283      ( 30 May 2024 21:11 )             40.9        Radiology:  < from: US Abdomen Upper Quadrant Right (05.30.24 @ 19:52) >  US ABDOMEN RT UPR QUADRANT   ORDERED BY: CARY TOMPKINS     PROCEDURE DATE:  05/30/2024          INTERPRETATION:  CLINICAL HISTORY / REASON FOR EXAM: Acute transaminitis.    COMPARISON: None    PROCEDURE: Ultrasound of theright upper quadrant was performed.    FINDINGS:    LIVER: Right hepatic lobe echogenic lesion measuring 1.2 x 0.9 cm. Patent   hepatic venous confluence.  BILE DUCTS: Normal caliber. Common bile duct measures 4 mm.  GALLBLADDER: Gallstones. No gallbladder wall thickening. No   pericholecystic fluid. Negative sonographic Ascencio's sign.  PANCREAS: Visualized portions are within normal limits.  RIGHT KIDNEY: 13.1 cm. No hydronephrosis.  ASCITES: None.  IVC: Visualized portions are within normal limits.      IMPRESSION:    Cholelithiasis without sonographic evidence of cholecystitis.    Lesion within the right hepatic lobe measuring up to 1.2 cm, likely   hemangioma. Nonemergent/outpatient follow-up imaging may be obtained for   further characterization.      < end of copied text >   5/31/24  MFM Att'g and PGY-3 f/u Consult Note:  PGY 3 Note  GA: 29.2    Pt seen and examined at bedside. Still complaining of wheezing, same as yesterday. Denies ctx, lof or vaginal bleeding.  Pt denies headache, dizziness, weakness, abdominal pain, palpitations, or diaphoresis.     Ambulating: Yes  Voiding: Yes  Flatus: Yes  Bowel movements: Yes   Diet: Regular    MEDICATIONS  (STANDING):  albuterol/ipratropium for Nebulization 3 milliLiter(s) Nebulizer every 6 hours  aspirin  chewable 81 milliGRAM(s) Oral daily  azithromycin   Tablet 250 milliGRAM(s) Oral every 24 hours  budesonide 160 MICROgram(s)/formoterol 4.5 MICROgram(s) Inhaler 2 Puff(s) Inhalation two times a day  glucagon  Injectable 1 milliGRAM(s) IntraMuscular once  insulin glargine Injectable (LANTUS) 36 Unit(s) SubCutaneous at bedtime  insulin glargine Injectable (LANTUS) 28 Unit(s) SubCutaneous every morning  insulin lispro Injectable (ADMELOG) 7 Unit(s) SubCutaneous before lunch  insulin lispro Injectable (ADMELOG) 9 Unit(s) SubCutaneous before dinner  insulin lispro Injectable (ADMELOG) 7 Unit(s) SubCutaneous before breakfast  loratadine 10 milliGRAM(s) Oral daily  montelukast 10 milliGRAM(s) Oral daily  pantoprazole    Tablet 40 milliGRAM(s) Oral before breakfast  predniSONE   Tablet 60 milliGRAM(s) Oral every 24 hours  prenatal multivitamin 1 Tablet(s) Oral daily  valACYclovir 1000 milliGRAM(s) Oral daily    MEDICATIONS  (PRN):  albuterol    90 MICROgram(s) HFA Inhaler 2 Puff(s) Inhalation every 6 hours PRN Shortness of Breath and/or Wheezing    PAST MEDICAL & SURGICAL HISTORY:  Asthma  Diabetes mellitus, type 2  Eczema  PCOS (polycystic ovarian syndrome)  Rh negative status during pregnancy  Rosacea  No significant past surgical history    Physical Exam:   Vital Signs Last 24 Hrs  T(C): 36.8 (31 May 2024 00:18), Max: 36.8 (31 May 2024 00:18)  T(F): 98.2 (31 May 2024 00:18), Max: 98.2 (31 May 2024 00:18)  HR: 103 (31 May 2024 00:18) (88 - 103)  BP: 101/62 (31 May 2024 00:18) (101/62 - 123/70)  RR: 18 (31 May 2024 00:18) (18 - 18)  SpO2: 98% (31 May 2024 00:18) (96% - 98%)      Gen: AOx3, NAD   Cardio: RRR, S1S2, no m/r/g  Pulm: bilateral wheezing in lower lobes  Abdomen: soft, gravid, nontender, no palpable contractions   Extremities: no swelling or erythema noted   Psych: normal mood and affect, no suicidal or homicidal ideations   BSS: BPP 8/8, MVP 5.91, vertex,   Labs:                        12.4   12.35 )-----------( 258      ( 31 May 2024 05:49 )             38.2                         13.2   10.59 )-----------( 283      ( 30 May 2024 21:11 )             40.9        Radiology:  < from: US Abdomen Upper Quadrant Right (05.30.24 @ 19:52) >  US ABDOMEN RT UPR QUADRANT   ORDERED BY: CARY TOMPKINS     PROCEDURE DATE:  05/30/2024          INTERPRETATION:  CLINICAL HISTORY / REASON FOR EXAM: Acute transaminitis.    COMPARISON: None    PROCEDURE: Ultrasound of theright upper quadrant was performed.    FINDINGS:    LIVER: Right hepatic lobe echogenic lesion measuring 1.2 x 0.9 cm. Patent   hepatic venous confluence.  BILE DUCTS: Normal caliber. Common bile duct measures 4 mm.  GALLBLADDER: Gallstones. No gallbladder wall thickening. No   pericholecystic fluid. Negative sonographic Ascencio's sign.  PANCREAS: Visualized portions are within normal limits.  RIGHT KIDNEY: 13.1 cm. No hydronephrosis.  ASCITES: None.  IVC: Visualized portions are within normal limits.      IMPRESSION:    Cholelithiasis without sonographic evidence of cholecystitis.    Lesion within the right hepatic lobe measuring up to 1.2 cm, likely   hemangioma. Nonemergent/outpatient follow-up imaging may be obtained for   further characterization.      < end of copied text >

## 2024-05-31 NOTE — PROGRESS NOTE ADULT - ASSESSMENT
Ms. Negro is a 34yo  @29w2, with shortness of breath, likely asthma exacerbation low suspicion for PE/VTE, complicated by poorly controlled T2DM, LGA fetus, HSV with outbreak in pregnancy.    #Shortness of breath  - Less likely DVT due to preliminarily negative LE Dopplers  - Less likely influenza or COVID due to negative PCR  - Chest x ray was clear    Likely asthma exacerbation.  - Vitals per routine,   - Prednisone 60 mg q24h for the time being, consider   - c/w Symbicort and singulair qd   - albuterol prn  - Claritin 10mg qD  - PPI  - Duonebs q6h  - On Azithromycin.  - Daily peak flow.  Today was 300.  - Appreciate Pulmonary input.    #T2DM, poorly controlled   - FS q4h  - Current insulin regimen:  Glargine 28U AM/ 36U PM, lispro    - Fingersticks may rise due to Prednisone.  - Cardiology as an outpatient.    #HSV (+)   - c/w valtrex 1 gm daily  - reports last genital outbreak was March    #Obesity - BMI 41  - c/w ASA 81 mg    #AST/ALT elevated  - AST/ALT 60/170  - repeat in AM  - f/u coagulation profile and hepatitis panel.  - s/p RUQ ultrasound      #Rh NEG  - Received rhogam 24 due to bleeding  - Follow up [facility] cell free fetal DNA screening for Rh.     Ms. Negro is a 34yo  @29w2, with shortness of breath, likely asthma exacerbation low suspicion for PE/VTE, complicated by poorly controlled T2DM, LGA fetus, HSV with outbreak in pregnancy.    1. Shortness of breath c/w asthma exacerbation. Unlikely DVT due to preliminarily negative LE Dopplers. Chest x ray was clear  - Less likely influenza or COVID due to negative PCR  - Vitals per routine,   - Prednisone 60 mg q24h for the time being, consider   - c/w Symbicort and singulair qd   - albuterol prn  - Claritin 10mg qD  - PPI  - Duonebs q6h  - Daily peak flow.  Today was 300.  - Appreciate Pulmonary input.    2. T2DM, suboptimal control due to steroid Rx.    - FS q4h  - Current insulin regimen:  Glargine 28U AM/ 36U PM, lispro    - Cardiology as an outpatient.    3. HSV (+) last outbreak was before pregnancy.  - c/w valtrex 1 gm daily    4. Obesity - BMI 41  - c/w ASA 81 mg    5. AST/ALT elevated  - AST/ALT 60/170  - repeat in AM  - f/u coagulation profile and hepatitis panel.  - s/p RUQ ultrasound  6. Rh NEG  - Received rhogam 24 due to bleeding  - Follow up [facility] cell free fetal DNA screening for Rh.    MD Hiram, FACOG with MD [facility], PGY-3

## 2024-05-31 NOTE — PROGRESS NOTE ADULT - ATTENDING COMMENTS
Slow response to Steroids and albuterol in hospital during pregnancy.  Given pregnancy and continued low peak flow, pt requires continued in-hospital Rx and monitoring.  odalis

## 2024-06-01 LAB
ALBUMIN SERPL ELPH-MCNC: 3.6 G/DL — SIGNIFICANT CHANGE UP (ref 3.5–5.2)
ALP SERPL-CCNC: 109 U/L — SIGNIFICANT CHANGE UP (ref 30–115)
ALT FLD-CCNC: 193 U/L — HIGH (ref 0–41)
ANION GAP SERPL CALC-SCNC: 11 MMOL/L — SIGNIFICANT CHANGE UP (ref 7–14)
AST SERPL-CCNC: 50 U/L — HIGH (ref 0–41)
BASOPHILS # BLD AUTO: 0.03 K/UL — SIGNIFICANT CHANGE UP (ref 0–0.2)
BASOPHILS NFR BLD AUTO: 0.2 % — SIGNIFICANT CHANGE UP (ref 0–1)
BILIRUB SERPL-MCNC: <0.2 MG/DL — SIGNIFICANT CHANGE UP (ref 0.2–1.2)
BLD GP AB SCN SERPL QL: SIGNIFICANT CHANGE UP
BUN SERPL-MCNC: 12 MG/DL — SIGNIFICANT CHANGE UP (ref 10–20)
CALCIUM SERPL-MCNC: 8.8 MG/DL — SIGNIFICANT CHANGE UP (ref 8.4–10.4)
CHLORIDE SERPL-SCNC: 103 MMOL/L — SIGNIFICANT CHANGE UP (ref 98–110)
CO2 SERPL-SCNC: 21 MMOL/L — SIGNIFICANT CHANGE UP (ref 17–32)
CREAT SERPL-MCNC: 0.5 MG/DL — LOW (ref 0.7–1.5)
EGFR: 125 ML/MIN/1.73M2 — SIGNIFICANT CHANGE UP
EOSINOPHIL # BLD AUTO: 0.04 K/UL — SIGNIFICANT CHANGE UP (ref 0–0.7)
EOSINOPHIL NFR BLD AUTO: 0.3 % — SIGNIFICANT CHANGE UP (ref 0–8)
GLUCOSE BLDC GLUCOMTR-MCNC: 114 MG/DL — HIGH (ref 70–99)
GLUCOSE BLDC GLUCOMTR-MCNC: 142 MG/DL — HIGH (ref 70–99)
GLUCOSE BLDC GLUCOMTR-MCNC: 158 MG/DL — HIGH (ref 70–99)
GLUCOSE BLDC GLUCOMTR-MCNC: 158 MG/DL — HIGH (ref 70–99)
GLUCOSE SERPL-MCNC: 163 MG/DL — HIGH (ref 70–99)
HAV IGM SER-ACNC: SIGNIFICANT CHANGE UP
HBV CORE IGM SER-ACNC: SIGNIFICANT CHANGE UP
HBV SURFACE AG SER-ACNC: SIGNIFICANT CHANGE UP
HCT VFR BLD CALC: 40.3 % — SIGNIFICANT CHANGE UP (ref 37–47)
HCV AB S/CO SERPL IA: 0.2 S/CO — SIGNIFICANT CHANGE UP (ref 0–0.99)
HCV AB SERPL-IMP: SIGNIFICANT CHANGE UP
HGB BLD-MCNC: 13 G/DL — SIGNIFICANT CHANGE UP (ref 12–16)
IMM GRANULOCYTES NFR BLD AUTO: 0.7 % — HIGH (ref 0.1–0.3)
LYMPHOCYTES # BLD AUTO: 1.72 K/UL — SIGNIFICANT CHANGE UP (ref 1.2–3.4)
LYMPHOCYTES # BLD AUTO: 13.1 % — LOW (ref 20.5–51.1)
MCHC RBC-ENTMCNC: 26.9 PG — LOW (ref 27–31)
MCHC RBC-ENTMCNC: 32.3 G/DL — SIGNIFICANT CHANGE UP (ref 32–37)
MCV RBC AUTO: 83.4 FL — SIGNIFICANT CHANGE UP (ref 81–99)
MONOCYTES # BLD AUTO: 0.23 K/UL — SIGNIFICANT CHANGE UP (ref 0.1–0.6)
MONOCYTES NFR BLD AUTO: 1.8 % — SIGNIFICANT CHANGE UP (ref 1.7–9.3)
NEUTROPHILS # BLD AUTO: 10.98 K/UL — HIGH (ref 1.4–6.5)
NEUTROPHILS NFR BLD AUTO: 83.9 % — HIGH (ref 42.2–75.2)
NRBC # BLD: 0 /100 WBCS — SIGNIFICANT CHANGE UP (ref 0–0)
PLATELET # BLD AUTO: 282 K/UL — SIGNIFICANT CHANGE UP (ref 130–400)
PMV BLD: 9.9 FL — SIGNIFICANT CHANGE UP (ref 7.4–10.4)
POTASSIUM SERPL-MCNC: 4.4 MMOL/L — SIGNIFICANT CHANGE UP (ref 3.5–5)
POTASSIUM SERPL-SCNC: 4.4 MMOL/L — SIGNIFICANT CHANGE UP (ref 3.5–5)
PROT SERPL-MCNC: 6.5 G/DL — SIGNIFICANT CHANGE UP (ref 6–8)
RBC # BLD: 4.83 M/UL — SIGNIFICANT CHANGE UP (ref 4.2–5.4)
RBC # FLD: 14.2 % — SIGNIFICANT CHANGE UP (ref 11.5–14.5)
SODIUM SERPL-SCNC: 135 MMOL/L — SIGNIFICANT CHANGE UP (ref 135–146)
WBC # BLD: 13.09 K/UL — HIGH (ref 4.8–10.8)
WBC # FLD AUTO: 13.09 K/UL — HIGH (ref 4.8–10.8)

## 2024-06-01 PROCEDURE — 99233 SBSQ HOSP IP/OBS HIGH 50: CPT

## 2024-06-01 RX ORDER — INSULIN GLARGINE 100 [IU]/ML
32 INJECTION, SOLUTION SUBCUTANEOUS EVERY MORNING
Refills: 0 | Status: DISCONTINUED | OUTPATIENT
Start: 2024-06-01 | End: 2024-06-02

## 2024-06-01 RX ORDER — INSULIN LISPRO 100/ML
8 VIAL (ML) SUBCUTANEOUS
Refills: 0 | Status: DISCONTINUED | OUTPATIENT
Start: 2024-06-01 | End: 2024-06-02

## 2024-06-01 RX ORDER — INSULIN GLARGINE 100 [IU]/ML
40 INJECTION, SOLUTION SUBCUTANEOUS AT BEDTIME
Refills: 0 | Status: DISCONTINUED | OUTPATIENT
Start: 2024-06-01 | End: 2024-06-02

## 2024-06-01 RX ORDER — INSULIN LISPRO 100/ML
10 VIAL (ML) SUBCUTANEOUS
Refills: 0 | Status: DISCONTINUED | OUTPATIENT
Start: 2024-06-01 | End: 2024-06-05

## 2024-06-01 RX ADMIN — Medication 10 UNIT(S): at 17:05

## 2024-06-01 RX ADMIN — BUDESONIDE AND FORMOTEROL FUMARATE DIHYDRATE 2 PUFF(S): 160; 4.5 AEROSOL RESPIRATORY (INHALATION) at 21:35

## 2024-06-01 RX ADMIN — Medication 60 MILLIGRAM(S): at 01:08

## 2024-06-01 RX ADMIN — Medication 7 UNIT(S): at 11:55

## 2024-06-01 RX ADMIN — INSULIN GLARGINE 28 UNIT(S): 100 INJECTION, SOLUTION SUBCUTANEOUS at 07:53

## 2024-06-01 RX ADMIN — Medication 3 MILLILITER(S): at 14:39

## 2024-06-01 RX ADMIN — AZITHROMYCIN 250 MILLIGRAM(S): 500 TABLET, FILM COATED ORAL at 05:42

## 2024-06-01 RX ADMIN — Medication 3 MILLILITER(S): at 02:49

## 2024-06-01 RX ADMIN — PANTOPRAZOLE SODIUM 40 MILLIGRAM(S): 20 TABLET, DELAYED RELEASE ORAL at 05:42

## 2024-06-01 RX ADMIN — Medication 81 MILLIGRAM(S): at 11:55

## 2024-06-01 RX ADMIN — LORATADINE 10 MILLIGRAM(S): 10 TABLET ORAL at 11:55

## 2024-06-01 RX ADMIN — Medication 1 TABLET(S): at 11:55

## 2024-06-01 RX ADMIN — MONTELUKAST 10 MILLIGRAM(S): 4 TABLET, CHEWABLE ORAL at 11:54

## 2024-06-01 RX ADMIN — Medication 3 MILLILITER(S): at 09:16

## 2024-06-01 RX ADMIN — Medication 7 UNIT(S): at 07:52

## 2024-06-01 RX ADMIN — INSULIN GLARGINE 40 UNIT(S): 100 INJECTION, SOLUTION SUBCUTANEOUS at 21:34

## 2024-06-01 RX ADMIN — Medication 3 MILLILITER(S): at 20:33

## 2024-06-01 NOTE — PROGRESS NOTE ADULT - SUBJECTIVE AND OBJECTIVE BOX
PGY 3 Note  GA: 29.3    Pt seen and examined at bedside. Still complaining of wheezing, same as yesterday. Denies ctx, lof or vaginal bleeding.  Pt denies headache, dizziness, weakness, abdominal pain, palpitations, or diaphoresis. Not eating diabetic diet.    Ambulating: Yes  Voiding: Yes  Flatus: Yes  Bowel movements: Yes   Diet: Regular    MEDICATIONS  (STANDING):  albuterol/ipratropium for Nebulization 3 milliLiter(s) Nebulizer every 6 hours  aspirin  chewable 81 milliGRAM(s) Oral daily  budesonide 160 MICROgram(s)/formoterol 4.5 MICROgram(s) Inhaler 2 Puff(s) Inhalation two times a day  dextrose 10% Bolus 125 milliLiter(s) IV Bolus once  dextrose 5%. 1000 milliLiter(s) (100 mL/Hr) IV Continuous <Continuous>  dextrose 5%. 1000 milliLiter(s) (50 mL/Hr) IV Continuous <Continuous>  dextrose 50% Injectable 25 Gram(s) IV Push once  dextrose 50% Injectable 12.5 Gram(s) IV Push once  glucagon  Injectable 1 milliGRAM(s) IntraMuscular once  insulin glargine Injectable (LANTUS) 36 Unit(s) SubCutaneous at bedtime  insulin glargine Injectable (LANTUS) 28 Unit(s) SubCutaneous every morning  insulin lispro Injectable (ADMELOG) 7 Unit(s) SubCutaneous before breakfast  insulin lispro Injectable (ADMELOG) 7 Unit(s) SubCutaneous before lunch  insulin lispro Injectable (ADMELOG) 9 Unit(s) SubCutaneous before dinner  loratadine 10 milliGRAM(s) Oral daily  montelukast 10 milliGRAM(s) Oral daily  pantoprazole    Tablet 40 milliGRAM(s) Oral before breakfast  predniSONE   Tablet 60 milliGRAM(s) Oral every 24 hours  prenatal multivitamin 1 Tablet(s) Oral daily    MEDICATIONS  (PRN):  albuterol    90 MICROgram(s) HFA Inhaler 2 Puff(s) Inhalation every 6 hours PRN Shortness of Breath and/or Wheezing  dextrose Oral Gel 15 Gram(s) Oral once PRN Blood Glucose LESS THAN 70 milliGRAM(s)/deciliter    PAST MEDICAL & SURGICAL HISTORY:  Asthma  Diabetes mellitus, type 2  Eczema  PCOS (polycystic ovarian syndrome)  Rh negative status during pregnancy  Rosacea  No significant past surgical history    Physical Exam:   Vital Signs Last 24 Hrs  T(C): 36.8 (01 Jun 2024 08:32), Max: 37.1 (01 Jun 2024 00:26)  T(F): 98.2 (01 Jun 2024 08:32), Max: 98.7 (01 Jun 2024 00:26)  HR: 85 (01 Jun 2024 08:32) (80 - 86)  BP: 110/65 (01 Jun 2024 08:32) (104/58 - 122/75)  RR: 18 (01 Jun 2024 08:32) (18 - 18)  SpO2: 96% (01 Jun 2024 08:32) (96% - 96%)      Gen: AOx3, NAD   Cardio: RRR, S1S2, no m/r/g  Pulm: CTA b/l  Abdomen: soft, gravid, nontender, no palpable contractions   Extremities: no swelling or erythema noted   BSS: BPP 8/8, MVP 5.97,     Labs:                        13.0   13.09 )-----------( 282      ( 01 Jun 2024 06:02 )             40.3                         12.4   12.35 )-----------( 258      ( 31 May 2024 05:49 )             38.2          Radiology:  < from: US Abdomen Upper Quadrant Right (05.30.24 @ 19:52) >  US ABDOMEN RT UPR QUADRANT   ORDERED BY: CARY TOMPKINS     PROCEDURE DATE:  05/30/2024          INTERPRETATION:  CLINICAL HISTORY / REASON FOR EXAM: Acute transaminitis.    COMPARISON: None    PROCEDURE: Ultrasound of theright upper quadrant was performed.    FINDINGS:    LIVER: Right hepatic lobe echogenic lesion measuring 1.2 x 0.9 cm. Patent   hepatic venous confluence.  BILE DUCTS: Normal caliber. Common bile duct measures 4 mm.  GALLBLADDER: Gallstones. No gallbladder wall thickening. No   pericholecystic fluid. Negative sonographic Ascencio's sign.  PANCREAS: Visualized portions are within normal limits.  RIGHT KIDNEY: 13.1 cm. No hydronephrosis.  ASCITES: None.  IVC: Visualized portions are within normal limits.      IMPRESSION:    Cholelithiasis without sonographic evidence of cholecystitis.    Lesion within the right hepatic lobe measuring up to 1.2 cm, likely   hemangioma. Nonemergent/outpatient follow-up imaging may be obtained for   further characterization.      < end of copied text >       MFM Att'g and PGY 3 Note  GA: 29.3    Pt seen and examined at bedside. Still complaining of wheezing, same as yesterday. Denies ctx, lof or vaginal bleeding.  Pt denies headache, dizziness, weakness, abdominal pain, palpitations, or diaphoresis. Not eating diabetic diet.    Ambulating: Yes  Voiding: Yes  Flatus: Yes  Bowel movements: Yes   Diet: Regular    MEDICATIONS  (STANDING):  albuterol/ipratropium for Nebulization 3 milliLiter(s) Nebulizer every 6 hours  aspirin  chewable 81 milliGRAM(s) Oral daily  budesonide 160 MICROgram(s)/formoterol 4.5 MICROgram(s) Inhaler 2 Puff(s) Inhalation two times a day  insulin glargine Injectable (LANTUS) 36 Unit(s) SubCutaneous at bedtime  insulin glargine Injectable (LANTUS) 28 Unit(s) SubCutaneous every morning  insulin lispro Injectable (ADMELOG) 7 Unit(s) SubCutaneous before breakfast  insulin lispro Injectable (ADMELOG) 7 Unit(s) SubCutaneous before lunch  insulin lispro Injectable (ADMELOG) 9 Unit(s) SubCutaneous before dinner  loratadine 10 milliGRAM(s) Oral daily  montelukast 10 milliGRAM(s) Oral daily  pantoprazole    Tablet 40 milliGRAM(s) Oral before breakfast  predniSONE   Tablet 60 milliGRAM(s) Oral every 24 hours  prenatal multivitamin 1 Tablet(s) Oral daily    MEDICATIONS  (PRN):  albuterol    90 MICROgram(s) HFA Inhaler 2 Puff(s) Inhalation every 6 hours PRN Shortness of Breath and/or Wheezing    PAST MEDICAL & SURGICAL HISTORY:  Asthma  Diabetes mellitus, type 2  Eczema  PCOS (polycystic ovarian syndrome)  Rh negative status during pregnancy  Rosacea  No significant past surgical history    Physical Exam:   Vital Signs Last 24 Hrs  T(C): 36.8 (01 Jun 2024 08:32), Max: 37.1 (01 Jun 2024 00:26)  T(F): 98.2 (01 Jun 2024 08:32), Max: 98.7 (01 Jun 2024 00:26)  HR: 85 (01 Jun 2024 08:32) (80 - 86)  BP: 110/65 (01 Jun 2024 08:32) (104/58 - 122/75)  RR: 18 (01 Jun 2024 08:32) (18 - 18)  SpO2: 96% (01 Jun 2024 08:32) (96% - 96%)      Gen: AOx3, NAD   Cardio: RRR, S1S2, no m/r/g  Pulm: CTA b/l  Abdomen: soft, gravid, nontender, no palpable contractions   Extremities: no swelling or erythema noted   BSS: BPP 8/8, MVP 5.97,     Labs:                        13.0   13.09 )-----------( 282      ( 01 Jun 2024 06:02 )             40.3                         12.4   12.35 )-----------( 258      ( 31 May 2024 05:49 )             38.2          Radiology:  < from: US Abdomen Upper Quadrant Right (05.30.24 @ 19:52) >  US ABDOMEN RT UPR QUADRANT   ORDERED BY: CARY TOMPKINS     PROCEDURE DATE:  05/30/2024          INTERPRETATION:  CLINICAL HISTORY / REASON FOR EXAM: Acute transaminitis.    COMPARISON: None    PROCEDURE: Ultrasound of theright upper quadrant was performed.    FINDINGS:    LIVER: Right hepatic lobe echogenic lesion measuring 1.2 x 0.9 cm. Patent   hepatic venous confluence.  BILE DUCTS: Normal caliber. Common bile duct measures 4 mm.  GALLBLADDER: Gallstones. No gallbladder wall thickening. No   pericholecystic fluid. Negative sonographic Ascencio's sign.  PANCREAS: Visualized portions are within normal limits.  RIGHT KIDNEY: 13.1 cm. No hydronephrosis.  ASCITES: None.  IVC: Visualized portions are within normal limits.      IMPRESSION:    Cholelithiasis without sonographic evidence of cholecystitis.    Lesion within the right hepatic lobe measuring up to 1.2 cm, likely   hemangioma. Nonemergent/outpatient follow-up imaging may be obtained for   further characterization.      < end of copied text >

## 2024-06-01 NOTE — PROGRESS NOTE ADULT - ASSESSMENT
Ms. Negro is a 32yo  @29w3, with shortness of breath, likely asthma exacerbation low suspicion for PE/VTE, complicated by poorly controlled T2DM, LGA fetus, HSV with outbreak in pregnancy.    #Shortness of breath  - Less likely DVT due to preliminarily negative LE Dopplers  - Less likely influenza or COVID due to negative PCR  - Chest x ray was clear    Likely asthma exacerbation.  - Vitals per routine,   - Prednisone 60 mg q24h for the time being, consider   - c/w Symbicort and singulair qd   - albuterol prn  - Claritin 10mg qD  - PPI  - Duonebs q6h  - s/p Azithromycin.  - Daily peak flow.  Today was 300.  - Appreciate Pulmonary input.    #T2DM, poorly controlled   - FS q4h  - Current insulin regimen:  Glargine 28U AM/ 36U PM, lispro    - Fingersticks may rise due to Prednisone.  - Cardiology as an outpatient.    #HSV (+)   - discontinued valtrex 1 gm daily due to transaminitis  - reports last genital outbreak was March    #Obesity - BMI 41  - c/w ASA 81 mg    #AST/ALT elevated  - AST/ALT 50/193  - f/u hepatitis panel  - repeat in AM  - f/u coagulation profile and hepatitis panel.  - s/p RUQ ultrasound      #Rh NEG  - Received rhogam 24 due to bleeding  - Follow up [facility] cell free fetal DNA screening for Rh. Awaiting results   Ms. Negro is a 34yo  @29w3, with shortness of breath, likely asthma exacerbation low suspicion for PE/VTE, complicated by poorly controlled T2DM, LGA fetus, HSV with outbreak in pregnancy.    #Shortness of breath  - Less likely DVT due to preliminarily negative LE Dopplers  - Less likely influenza or COVID due to negative PCR  - Chest x ray was clear    Likely asthma exacerbation.  - Vitals per routine,   - Prednisone 60 mg q24h for the time being, consider   - c/w Symbicort and singulair qd   - albuterol prn  - Claritin 10mg qD  - PPI  - Duonebs q6h  - s/p Azithromycin.  - Daily peak flow.  Today was 300.  - Appreciate Pulmonary input.    #T2DM, poorly controlled   - FS q4h  - Current insulin regimen:  Glargine 28U AM/ 36U PM, lispro    - Fingersticks may rise due to Prednisone.  - Cardiology as an outpatient.    #HSV (+)   - discontinued valtrex 1 gm daily due to transaminitis  - reports last genital outbreak was March    #Obesity - BMI 41  - c/w ASA 81 mg    #AST/ALT elevated:  AST/ALT 50/193  - f/u hepatitis panel  - repeat in AM  - f/u coagulation profile and hepatitis panel.  - s/p RUQ ultrasound is normal.   #Rh NEG  - Received rhogam 24 due to bleeding  - Follow up [facility] cell free fetal DNA screening for Rh. Awaiting results    MD Hiram, FACOG, with MD Clif[place], PGY-3

## 2024-06-02 LAB
ALBUMIN SERPL ELPH-MCNC: 3.5 G/DL — SIGNIFICANT CHANGE UP (ref 3.5–5.2)
ALP SERPL-CCNC: 108 U/L — SIGNIFICANT CHANGE UP (ref 30–115)
ALT FLD-CCNC: 153 U/L — HIGH (ref 0–41)
ANION GAP SERPL CALC-SCNC: 12 MMOL/L — SIGNIFICANT CHANGE UP (ref 7–14)
AST SERPL-CCNC: 36 U/L — SIGNIFICANT CHANGE UP (ref 0–41)
BASOPHILS # BLD AUTO: 0.03 K/UL — SIGNIFICANT CHANGE UP (ref 0–0.2)
BASOPHILS NFR BLD AUTO: 0.3 % — SIGNIFICANT CHANGE UP (ref 0–1)
BILIRUB SERPL-MCNC: <0.2 MG/DL — SIGNIFICANT CHANGE UP (ref 0.2–1.2)
BUN SERPL-MCNC: 14 MG/DL — SIGNIFICANT CHANGE UP (ref 10–20)
CALCIUM SERPL-MCNC: 8.9 MG/DL — SIGNIFICANT CHANGE UP (ref 8.4–10.5)
CHLORIDE SERPL-SCNC: 104 MMOL/L — SIGNIFICANT CHANGE UP (ref 98–110)
CO2 SERPL-SCNC: 20 MMOL/L — SIGNIFICANT CHANGE UP (ref 17–32)
CREAT SERPL-MCNC: 0.5 MG/DL — LOW (ref 0.7–1.5)
EGFR: 125 ML/MIN/1.73M2 — SIGNIFICANT CHANGE UP
EOSINOPHIL # BLD AUTO: 0.04 K/UL — SIGNIFICANT CHANGE UP (ref 0–0.7)
EOSINOPHIL NFR BLD AUTO: 0.4 % — SIGNIFICANT CHANGE UP (ref 0–8)
GLUCOSE BLDC GLUCOMTR-MCNC: 124 MG/DL — HIGH (ref 70–99)
GLUCOSE BLDC GLUCOMTR-MCNC: 137 MG/DL — HIGH (ref 70–99)
GLUCOSE BLDC GLUCOMTR-MCNC: 142 MG/DL — HIGH (ref 70–99)
GLUCOSE BLDC GLUCOMTR-MCNC: 163 MG/DL — HIGH (ref 70–99)
GLUCOSE SERPL-MCNC: 193 MG/DL — HIGH (ref 70–99)
HCT VFR BLD CALC: 41.1 % — SIGNIFICANT CHANGE UP (ref 37–47)
HGB BLD-MCNC: 13 G/DL — SIGNIFICANT CHANGE UP (ref 12–16)
IMM GRANULOCYTES NFR BLD AUTO: 0.8 % — HIGH (ref 0.1–0.3)
LYMPHOCYTES # BLD AUTO: 1.45 K/UL — SIGNIFICANT CHANGE UP (ref 1.2–3.4)
LYMPHOCYTES # BLD AUTO: 12.8 % — LOW (ref 20.5–51.1)
MCHC RBC-ENTMCNC: 26.3 PG — LOW (ref 27–31)
MCHC RBC-ENTMCNC: 31.6 G/DL — LOW (ref 32–37)
MCV RBC AUTO: 83.2 FL — SIGNIFICANT CHANGE UP (ref 81–99)
MONOCYTES # BLD AUTO: 0.23 K/UL — SIGNIFICANT CHANGE UP (ref 0.1–0.6)
MONOCYTES NFR BLD AUTO: 2 % — SIGNIFICANT CHANGE UP (ref 1.7–9.3)
NEUTROPHILS # BLD AUTO: 9.46 K/UL — HIGH (ref 1.4–6.5)
NEUTROPHILS NFR BLD AUTO: 83.7 % — HIGH (ref 42.2–75.2)
NRBC # BLD: 0 /100 WBCS — SIGNIFICANT CHANGE UP (ref 0–0)
PLATELET # BLD AUTO: 272 K/UL — SIGNIFICANT CHANGE UP (ref 130–400)
PMV BLD: 9.8 FL — SIGNIFICANT CHANGE UP (ref 7.4–10.4)
POTASSIUM SERPL-MCNC: 4.3 MMOL/L — SIGNIFICANT CHANGE UP (ref 3.5–5)
POTASSIUM SERPL-SCNC: 4.3 MMOL/L — SIGNIFICANT CHANGE UP (ref 3.5–5)
PROT SERPL-MCNC: 6.5 G/DL — SIGNIFICANT CHANGE UP (ref 6–8)
RBC # BLD: 4.94 M/UL — SIGNIFICANT CHANGE UP (ref 4.2–5.4)
RBC # FLD: 14.2 % — SIGNIFICANT CHANGE UP (ref 11.5–14.5)
SODIUM SERPL-SCNC: 136 MMOL/L — SIGNIFICANT CHANGE UP (ref 135–146)
WBC # BLD: 11.3 K/UL — HIGH (ref 4.8–10.8)
WBC # FLD AUTO: 11.3 K/UL — HIGH (ref 4.8–10.8)

## 2024-06-02 PROCEDURE — 99233 SBSQ HOSP IP/OBS HIGH 50: CPT

## 2024-06-02 RX ORDER — INSULIN GLARGINE 100 [IU]/ML
44 INJECTION, SOLUTION SUBCUTANEOUS AT BEDTIME
Refills: 0 | Status: DISCONTINUED | OUTPATIENT
Start: 2024-06-02 | End: 2024-06-03

## 2024-06-02 RX ORDER — INSULIN GLARGINE 100 [IU]/ML
36 INJECTION, SOLUTION SUBCUTANEOUS EVERY MORNING
Refills: 0 | Status: DISCONTINUED | OUTPATIENT
Start: 2024-06-02 | End: 2024-06-03

## 2024-06-02 RX ORDER — INSULIN LISPRO 100/ML
10 VIAL (ML) SUBCUTANEOUS
Refills: 0 | Status: DISCONTINUED | OUTPATIENT
Start: 2024-06-02 | End: 2024-06-05

## 2024-06-02 RX ADMIN — INSULIN GLARGINE 32 UNIT(S): 100 INJECTION, SOLUTION SUBCUTANEOUS at 07:53

## 2024-06-02 RX ADMIN — Medication 81 MILLIGRAM(S): at 12:02

## 2024-06-02 RX ADMIN — MONTELUKAST 10 MILLIGRAM(S): 4 TABLET, CHEWABLE ORAL at 12:02

## 2024-06-02 RX ADMIN — Medication 3 MILLILITER(S): at 17:02

## 2024-06-02 RX ADMIN — Medication 8 UNIT(S): at 12:02

## 2024-06-02 RX ADMIN — Medication 8 UNIT(S): at 07:51

## 2024-06-02 RX ADMIN — Medication 1 TABLET(S): at 12:02

## 2024-06-02 RX ADMIN — Medication 3 MILLILITER(S): at 01:55

## 2024-06-02 RX ADMIN — LORATADINE 10 MILLIGRAM(S): 10 TABLET ORAL at 12:02

## 2024-06-02 RX ADMIN — BUDESONIDE AND FORMOTEROL FUMARATE DIHYDRATE 2 PUFF(S): 160; 4.5 AEROSOL RESPIRATORY (INHALATION) at 07:51

## 2024-06-02 RX ADMIN — INSULIN GLARGINE 44 UNIT(S): 100 INJECTION, SOLUTION SUBCUTANEOUS at 21:54

## 2024-06-02 RX ADMIN — Medication 10 UNIT(S): at 17:54

## 2024-06-02 RX ADMIN — PANTOPRAZOLE SODIUM 40 MILLIGRAM(S): 20 TABLET, DELAYED RELEASE ORAL at 05:56

## 2024-06-02 RX ADMIN — Medication 60 MILLIGRAM(S): at 02:21

## 2024-06-02 RX ADMIN — Medication 3 MILLILITER(S): at 20:46

## 2024-06-02 RX ADMIN — BUDESONIDE AND FORMOTEROL FUMARATE DIHYDRATE 2 PUFF(S): 160; 4.5 AEROSOL RESPIRATORY (INHALATION) at 21:00

## 2024-06-02 NOTE — PROGRESS NOTE ADULT - ATTENDING COMMENTS
[facility] Staff    I saw and evaluated Ms. TAYLOR JAY.  Ms. TAYLOR JAY reports positive fetal movement and no vaginal bleeding.  She states that she feels the same today as she did yesterday.    General:  Ms. TAYLOR JAY is lying in bed.  She appears comfortable.    Vital Signs Last 24 Hrs  T(C): 36.8 (2024 09:28), Max: 37.1 (2024 00:08)  T(F): 98.2 (2024 09:28), Max: 98.7 (2024 00:08)  HR: 94 (2024 09:28) (94 - 97)  BP: 103/64 (2024 09:28) (103/64 - 122/74)  BP(mean): --  RR: 18 (2024 09:28) (18 - 18)  SpO2: 96% (2024 09:28) (96% - 98%)    Cardiovascular:  Normal S1 S2.  No murmurs.  Pulmonary:  Wheezing bilaterally.  Abdomen:  Soft, nontender, nondistended, no rebound, no guarding.  Extremities:  Nontender calves.  Neurology:  Deep tendon reflexes 2+ bilaterally.    Labs as above    Ms. Jay is a 32yo  @ 29w4d, with shortness of breath, likely asthma exacerbation low suspicion for PE/VTE, complicated by poorly controlled T2DM, LGA fetus, HSV with outbreak in pregnancy.    #Shortness of breath  - Less likely DVT due to preliminarily negative LE Dopplers  - Less likely influenza or COVID due to negative PCR  - Chest x ray was clear    Likely asthma exacerbation.  - Vitals per routine,   - Prednisone 60 mg q24h for the time being  - c/w Symbicort and singulair qd.  Consider increasing Symbicort dosing.  - albuterol prn  - Claritin 10mg qD  - PPI  - Duonebs q6h  - s/p Azithromycin.  - Daily peak flow.  Today was 300.  - Appreciate Pulmonary input.     #T2DM, poorly controlled   - FS q4h  - Current insulin regimen: Glargine 32u AM/ 40u PM, lispro 8/8/10.    - We will increase the regimen to Glargine 36u AM/ 44u PM, lispro 10/10/10.   - Fingersticks may rise due to Prednisone.  - Cardiology as an outpatient.    #HSV (+)   - discontinued valtrex 1 gm daily due to transaminitis  - reports last genital outbreak was March    #Obesity - BMI 41  - c/w ASA 81 mg    #AST/ALT elevated  - AST/ALT 50/193-->36/153  - f/u coagulation profile   - Hepatitis panel negative  - repeat in AM  - Tylenol should not be used for the time being.  - s/p RUQ ultrasound:  Cholelithiasis without sonographic evidence of cholecystitis.     Lesion within the right hepatic lobe measuring up to 1.2 cm, likely hemangioma.     Reza Sheppard MD

## 2024-06-02 NOTE — PROGRESS NOTE ADULT - SUBJECTIVE AND OBJECTIVE BOX
PGY 2 Note  6/2/24  GA: 29w4d    Pt seen and examined at bedside. Still complaining of wheezing, same as yesterday. Denies ctx, lof or vaginal bleeding. Pt denies headache, dizziness, weakness, abdominal pain, palpitations, or diaphoresis. Not eating diabetic diet.    Ambulating: Yes  Voiding: Yes  Flatus: Yes  Bowel movements: Yes   Diet: Regular    MEDICATIONS  (STANDING):  albuterol/ipratropium for Nebulization 3 milliLiter(s) Nebulizer every 6 hours  aspirin  chewable 81 milliGRAM(s) Oral daily  budesonide 160 MICROgram(s)/formoterol 4.5 MICROgram(s) Inhaler 2 Puff(s) Inhalation two times a day  dextrose 10% Bolus 125 milliLiter(s) IV Bolus once  dextrose 5%. 1000 milliLiter(s) (100 mL/Hr) IV Continuous <Continuous>  dextrose 5%. 1000 milliLiter(s) (50 mL/Hr) IV Continuous <Continuous>  dextrose 50% Injectable 25 Gram(s) IV Push once  dextrose 50% Injectable 12.5 Gram(s) IV Push once  glucagon  Injectable 1 milliGRAM(s) IntraMuscular once  insulin glargine Injectable (LANTUS) 32 Unit(s) SubCutaneous every morning  insulin glargine Injectable (LANTUS) 40 Unit(s) SubCutaneous at bedtime  insulin lispro Injectable (ADMELOG) 10 Unit(s) SubCutaneous before dinner  insulin lispro Injectable (ADMELOG) 8 Unit(s) SubCutaneous before breakfast  insulin lispro Injectable (ADMELOG) 8 Unit(s) SubCutaneous before lunch  loratadine 10 milliGRAM(s) Oral daily  montelukast 10 milliGRAM(s) Oral daily  pantoprazole    Tablet 40 milliGRAM(s) Oral before breakfast  predniSONE   Tablet 60 milliGRAM(s) Oral every 24 hours  prenatal multivitamin 1 Tablet(s) Oral daily    PAST MEDICAL & SURGICAL HISTORY:  Asthma  Diabetes mellitus, type 2  Eczema  PCOS (polycystic ovarian syndrome)  Rh negative status during pregnancy  Rosacea    No significant past surgical history    Physical Exam:   Vital Signs Last 24 Hrs  T(C): 37.1 (02 Jun 2024 00:08), Max: 37.1 (02 Jun 2024 00:08)  T(F): 98.7 (02 Jun 2024 00:08), Max: 98.7 (02 Jun 2024 00:08)  HR: 94 (02 Jun 2024 00:08) (85 - 97)  BP: 116/71 (02 Jun 2024 00:08) (110/65 - 122/74)  BP(mean): --  RR: 18 (02 Jun 2024 00:08) (18 - 18)  SpO2: 98% (02 Jun 2024 00:08) (96% - 98%)    Gen: AOx3, NAD   Abdomen: soft, gravid, nontender, no palpable contractions   Extremities: no swelling or erythema noted     BSS: BPP 8/8 MVP 9.18cm polyhydramnios, vtx  Labs:                        13.0   11.30 )-----------( 272      ( 02 Jun 2024 06:28 )             41.1                         13.0   13.09 )-----------( 282      ( 01 Jun 2024 06:02 )             40.3           PGY 2 Note  6/2/24  GA: 29w4d    Pt seen and examined at bedside. Still complaining of wheezing, same as yesterday. Denies ctx, lof or vaginal bleeding. Pt denies headache, dizziness, weakness, abdominal pain, palpitations, or diaphoresis. Not eating diabetic diet.    Ambulating: Yes  Voiding: Yes  Flatus: Yes  Bowel movements: Yes   Diet: Regular    MEDICATIONS  (STANDING):  albuterol/ipratropium for Nebulization 3 milliLiter(s) Nebulizer every 6 hours  aspirin  chewable 81 milliGRAM(s) Oral daily  budesonide 160 MICROgram(s)/formoterol 4.5 MICROgram(s) Inhaler 2 Puff(s) Inhalation two times a day  dextrose 10% Bolus 125 milliLiter(s) IV Bolus once  dextrose 5%. 1000 milliLiter(s) (100 mL/Hr) IV Continuous <Continuous>  dextrose 5%. 1000 milliLiter(s) (50 mL/Hr) IV Continuous <Continuous>  dextrose 50% Injectable 25 Gram(s) IV Push once  dextrose 50% Injectable 12.5 Gram(s) IV Push once  glucagon  Injectable 1 milliGRAM(s) IntraMuscular once  insulin glargine Injectable (LANTUS) 32 Unit(s) SubCutaneous every morning  insulin glargine Injectable (LANTUS) 40 Unit(s) SubCutaneous at bedtime  insulin lispro Injectable (ADMELOG) 10 Unit(s) SubCutaneous before dinner  insulin lispro Injectable (ADMELOG) 8 Unit(s) SubCutaneous before breakfast  insulin lispro Injectable (ADMELOG) 8 Unit(s) SubCutaneous before lunch  loratadine 10 milliGRAM(s) Oral daily  montelukast 10 milliGRAM(s) Oral daily  pantoprazole    Tablet 40 milliGRAM(s) Oral before breakfast  predniSONE   Tablet 60 milliGRAM(s) Oral every 24 hours  prenatal multivitamin 1 Tablet(s) Oral daily    PAST MEDICAL & SURGICAL HISTORY:  Asthma  Diabetes mellitus, type 2  Eczema  PCOS (polycystic ovarian syndrome)  Rh negative status during pregnancy  Rosacea    No significant past surgical history    Physical Exam:   Vital Signs Last 24 Hrs  T(C): 37.1 (02 Jun 2024 00:08), Max: 37.1 (02 Jun 2024 00:08)  T(F): 98.7 (02 Jun 2024 00:08), Max: 98.7 (02 Jun 2024 00:08)  HR: 94 (02 Jun 2024 00:08) (85 - 97)  BP: 116/71 (02 Jun 2024 00:08) (110/65 - 122/74)  RR: 18 (02 Jun 2024 00:08) (18 - 18)  SpO2: 98% (02 Jun 2024 00:08) (96% - 98%)    Gen: AOx3, NAD   Cardio: RRR  Pulm: CTABL  Abdomen: soft, gravid, nontender, no palpable contractions   Extremities: no swelling or erythema noted      BSS: BPP 8/8 MVP 9.18cm polyhydramnios, vtx    Labs:                        13.0   11.30 )-----------( 272      ( 02 Jun 2024 06:28 )             41.1                         13.0   13.09 )-----------( 282      ( 01 Jun 2024 06:02 )             40.3           PGY 2 Note  6/2/24  GA: 29w4d    Pt seen and examined at bedside. Still complaining of wheezing, same as yesterday. Denies ctx, lof or vaginal bleeding. Pt denies headache, dizziness, weakness, abdominal pain, palpitations, or diaphoresis. Not eating diabetic diet.    Ambulating: Yes  Voiding: Yes  Flatus: Yes  Bowel movements: Yes   Diet: Regular    MEDICATIONS  (STANDING):  albuterol/ipratropium for Nebulization 3 milliLiter(s) Nebulizer every 6 hours  aspirin  chewable 81 milliGRAM(s) Oral daily  budesonide 160 MICROgram(s)/formoterol 4.5 MICROgram(s) Inhaler 2 Puff(s) Inhalation two times a day  dextrose 10% Bolus 125 milliLiter(s) IV Bolus once  dextrose 5%. 1000 milliLiter(s) (100 mL/Hr) IV Continuous <Continuous>  dextrose 5%. 1000 milliLiter(s) (50 mL/Hr) IV Continuous <Continuous>  dextrose 50% Injectable 25 Gram(s) IV Push once  dextrose 50% Injectable 12.5 Gram(s) IV Push once  glucagon  Injectable 1 milliGRAM(s) IntraMuscular once  insulin glargine Injectable (LANTUS) 32 Unit(s) SubCutaneous every morning  insulin glargine Injectable (LANTUS) 40 Unit(s) SubCutaneous at bedtime  insulin lispro Injectable (ADMELOG) 10 Unit(s) SubCutaneous before dinner  insulin lispro Injectable (ADMELOG) 8 Unit(s) SubCutaneous before breakfast  insulin lispro Injectable (ADMELOG) 8 Unit(s) SubCutaneous before lunch  loratadine 10 milliGRAM(s) Oral daily  montelukast 10 milliGRAM(s) Oral daily  pantoprazole    Tablet 40 milliGRAM(s) Oral before breakfast  predniSONE   Tablet 60 milliGRAM(s) Oral every 24 hours  prenatal multivitamin 1 Tablet(s) Oral daily    PAST MEDICAL & SURGICAL HISTORY:  Asthma  Diabetes mellitus, type 2  Eczema  PCOS (polycystic ovarian syndrome)  Rh negative status during pregnancy  Rosacea    No significant past surgical history    Physical Exam:   Vital Signs Last 24 Hrs  T(C): 37.1 (02 Jun 2024 00:08), Max: 37.1 (02 Jun 2024 00:08)  T(F): 98.7 (02 Jun 2024 00:08), Max: 98.7 (02 Jun 2024 00:08)  HR: 94 (02 Jun 2024 00:08) (85 - 97)  BP: 116/71 (02 Jun 2024 00:08) (110/65 - 122/74)  RR: 18 (02 Jun 2024 00:08) (18 - 18)  SpO2: 98% (02 Jun 2024 00:08) (96% - 98%)    Gen: AOx3, NAD   Cardio: RRR  Pulm: CTABL  Abdomen: soft, gravid, nontender, no palpable contractions   Extremities: no swelling or erythema noted      BSS: BPP 8/8 MVP 9.18cm polyhydramnios, vtx    Labs:                       13.0   11.30 )-----------( 272      ( 02 Jun 2024 06:28 )             41.1                         13.0   13.09 )-----------( 282      ( 01 Jun 2024 06:02 )             40.3                          06-02    136  |  104  |  14  ----------------------------<  193  4.3   |  20  |  0.5  06-01    135  |  103  |  12  ----------------------------<  163  4.4   |  21  |  0.5    Ca    8.9      02 Jun 2024 06:28   Mg    x   02 Jun 2024 06:28  Phos    x    02 Jun 2024 06:28  Ca    8.8      01 Jun 2024 06:02   Mg    x   01 Jun 2024 06:02  Phos    x    01 Jun 2024 06:02    TPro  6.5  /  Alb  3.5  /  TBili  <0.2  /  DBili  x   /  AST  36  /  ALT  153  /  AlkPhos  108  06-02-24      Lactate dehydrogenase  x     06-02-24  Uric acid    x    06-02-24  TPro  6.5  /  Alb  3.6  /  TBili  <0.2  /  DBili  x   /  AST  50  /  ALT  193  /  AlkPhos  109  06-01-24      Lactate dehydrogenase  x     06-01-24  Uric acid    x    06-01-24    Acute Hepatitis Panel (05.30.24 @ 21:11)    Hepatitis C Virus Interpretation: Nonreact: S/CO Ratio                      Interpretation  0.00 - 0.79                         Non-Reactive  0.80 - 0.99                         Equivocal  >= 1.00                              Reactive  Non-Reactive: Antibodies to HCV were not detected; does not exclude the  possibility of recent exposure to HCV. No further action is needed unless  recent infection is expected.  Equivocal: Antibodies to HCV may or may not be present. HCV RNA testing  will follow to identify current infection.  Reactive: Presumptive evidence of antibodies to HCV. HCV RNA testing will  follow to identify current infection.   Hepatitis C Virus S/CO Ratio: 0.20 S/CO   Hepatitis B Core IgM Antibody: Nonreact   Hepatitis B Surface Antigen: Nonreact   Hepatitis A IgM Antibody: Nonreact        CAPILLARY BLOOD GLUCOSE      POCT Blood Glucose.: 142 mg/dL (02 Jun 2024 11:48)  POCT Blood Glucose.: 163 mg/dL (02 Jun 2024 07:19)  POCT Blood Glucose.: 114 mg/dL (01 Jun 2024 20:49)  POCT Blood Glucose.: 158 mg/dL (01 Jun 2024 16:40)       < from: US Abdomen Upper Quadrant Right (05.30.24 @ 19:52) >    IMPRESSION:    Cholelithiasis without sonographic evidence of cholecystitis.    Lesion within the right hepatic lobe measuring up to 1.2 cm, likely   hemangioma. Nonemergent/outpatient follow-up imaging may be obtained for   further characterization.    < end of copied text >

## 2024-06-02 NOTE — PROGRESS NOTE ADULT - ASSESSMENT
Ms. Negro is a 34yo  @29w4, with shortness of breath, likely asthma exacerbation low suspicion for PE/VTE, complicated by poorly controlled T2DM, LGA fetus, HSV with outbreak in pregnancy.    #Shortness of breath  - Less likely DVT due to preliminarily negative LE Dopplers  - Less likely influenza or COVID due to negative PCR  - Chest x ray was clear    Likely asthma exacerbation.  - Vitals per routine,   - Prednisone 60 mg q24h for the time being, consider   - c/w Symbicort and singulair qd   - albuterol prn  - Claritin 10mg qD  - PPI  - Duonebs q6h  - s/p Azithromycin.  - Daily peak flow.  Today was 300.  - Appreciate Pulmonary input.    #T2DM, poorly controlled   - FS q4h  - Current insulin regimen: Glargine 32u AM/ 40u PM, lispro 8/8/10 ordered,   - Fingersticks may rise due to Prednisone.  - Cardiology as an outpatient.    #HSV (+)   - discontinued valtrex 1 gm daily due to transaminitis  - reports last genital outbreak was March    #Obesity - BMI 41  - c/w ASA 81 mg    #AST/ALT elevated  - AST/ALT 50/193  - f/u hepatitis panel  - repeat in AM  - f/u coagulation profile and hepatitis panel.  - s/p RUQ ultrasound      #Rh NEG  - Received rhogam 24 due to bleeding  - Follow up [facility] cell free fetal DNA screening for Rh. Awaiting results Ms. Negro is a 32yo  @29w4, with shortness of breath, likely asthma exacerbation low suspicion for PE/VTE, complicated by poorly controlled T2DM, LGA fetus, HSV with outbreak in pregnancy.    #Shortness of breath  - Less likely DVT due to preliminarily negative LE Dopplers  - Less likely influenza or COVID due to negative PCR  - Chest x ray was clear    Likely asthma exacerbation.  - Vitals per routine,   - Prednisone 60 mg q24h for the time being,   - c/w Symbicort and singulair qd   - albuterol prn  - Claritin 10mg qD  - PPI  - Duonebs q6h  - s/p Azithromycin.  - Daily peak flow.  Today was 300.  - Appreciate Pulmonary input.    #T2DM, poorly controlled   - FS q4h  - Current insulin regimen: Glargine 32u AM/ 40u PM, lispro 8/8/10 ordered,   - Fingersticks may rise due to Prednisone.  - Cardiology as an outpatient.    #HSV (+)   - discontinued valtrex 1 gm daily due to transaminitis  - reports last genital outbreak was March    #Obesity - BMI 41  - c/w ASA 81 mg    #AST/ALT elevated  - AST/ALT 50/193  - Hepatitis panel negative.  - repeat in AM  - f/u coagulation profile.  - s/p RUQ ultrasound    #Rh NEG  - Received rhogam 24 due to bleeding  - Follow up [facility] cell free fetal DNA screening for Rh. Awaiting results

## 2024-06-03 LAB
ALBUMIN SERPL ELPH-MCNC: 3.7 G/DL — SIGNIFICANT CHANGE UP (ref 3.5–5.2)
ALP SERPL-CCNC: 93 U/L — SIGNIFICANT CHANGE UP (ref 30–115)
ALT FLD-CCNC: 131 U/L — HIGH (ref 0–41)
ANION GAP SERPL CALC-SCNC: 11 MMOL/L — SIGNIFICANT CHANGE UP (ref 7–14)
AST SERPL-CCNC: 29 U/L — SIGNIFICANT CHANGE UP (ref 0–41)
BASOPHILS # BLD AUTO: 0.04 K/UL — SIGNIFICANT CHANGE UP (ref 0–0.2)
BASOPHILS NFR BLD AUTO: 0.3 % — SIGNIFICANT CHANGE UP (ref 0–1)
BILIRUB SERPL-MCNC: <0.2 MG/DL — SIGNIFICANT CHANGE UP (ref 0.2–1.2)
BUN SERPL-MCNC: 14 MG/DL — SIGNIFICANT CHANGE UP (ref 10–20)
CALCIUM SERPL-MCNC: 9.2 MG/DL — SIGNIFICANT CHANGE UP (ref 8.4–10.5)
CHLORIDE SERPL-SCNC: 102 MMOL/L — SIGNIFICANT CHANGE UP (ref 98–110)
CO2 SERPL-SCNC: 21 MMOL/L — SIGNIFICANT CHANGE UP (ref 17–32)
CREAT SERPL-MCNC: 0.5 MG/DL — LOW (ref 0.7–1.5)
EGFR: 125 ML/MIN/1.73M2 — SIGNIFICANT CHANGE UP
EOSINOPHIL # BLD AUTO: 0.04 K/UL — SIGNIFICANT CHANGE UP (ref 0–0.7)
EOSINOPHIL NFR BLD AUTO: 0.3 % — SIGNIFICANT CHANGE UP (ref 0–8)
GLUCOSE BLDC GLUCOMTR-MCNC: 130 MG/DL — HIGH (ref 70–99)
GLUCOSE BLDC GLUCOMTR-MCNC: 137 MG/DL — HIGH (ref 70–99)
GLUCOSE BLDC GLUCOMTR-MCNC: 148 MG/DL — HIGH (ref 70–99)
GLUCOSE BLDC GLUCOMTR-MCNC: 200 MG/DL — HIGH (ref 70–99)
GLUCOSE SERPL-MCNC: 161 MG/DL — HIGH (ref 70–99)
HCT VFR BLD CALC: 40.1 % — SIGNIFICANT CHANGE UP (ref 37–47)
HGB BLD-MCNC: 13 G/DL — SIGNIFICANT CHANGE UP (ref 12–16)
IMM GRANULOCYTES NFR BLD AUTO: 0.8 % — HIGH (ref 0.1–0.3)
LYMPHOCYTES # BLD AUTO: 1.64 K/UL — SIGNIFICANT CHANGE UP (ref 1.2–3.4)
LYMPHOCYTES # BLD AUTO: 13.4 % — LOW (ref 20.5–51.1)
MCHC RBC-ENTMCNC: 26.5 PG — LOW (ref 27–31)
MCHC RBC-ENTMCNC: 32.4 G/DL — SIGNIFICANT CHANGE UP (ref 32–37)
MCV RBC AUTO: 81.8 FL — SIGNIFICANT CHANGE UP (ref 81–99)
MONOCYTES # BLD AUTO: 0.24 K/UL — SIGNIFICANT CHANGE UP (ref 0.1–0.6)
MONOCYTES NFR BLD AUTO: 2 % — SIGNIFICANT CHANGE UP (ref 1.7–9.3)
NEUTROPHILS # BLD AUTO: 10.18 K/UL — HIGH (ref 1.4–6.5)
NEUTROPHILS NFR BLD AUTO: 83.2 % — HIGH (ref 42.2–75.2)
NRBC # BLD: 0 /100 WBCS — SIGNIFICANT CHANGE UP (ref 0–0)
PLATELET # BLD AUTO: 309 K/UL — SIGNIFICANT CHANGE UP (ref 130–400)
PMV BLD: 10.1 FL — SIGNIFICANT CHANGE UP (ref 7.4–10.4)
POTASSIUM SERPL-MCNC: 4.6 MMOL/L — SIGNIFICANT CHANGE UP (ref 3.5–5)
POTASSIUM SERPL-SCNC: 4.6 MMOL/L — SIGNIFICANT CHANGE UP (ref 3.5–5)
PROT SERPL-MCNC: 6.3 G/DL — SIGNIFICANT CHANGE UP (ref 6–8)
RBC # BLD: 4.9 M/UL — SIGNIFICANT CHANGE UP (ref 4.2–5.4)
RBC # FLD: 14.1 % — SIGNIFICANT CHANGE UP (ref 11.5–14.5)
SODIUM SERPL-SCNC: 134 MMOL/L — LOW (ref 135–146)
WBC # BLD: 12.24 K/UL — HIGH (ref 4.8–10.8)
WBC # FLD AUTO: 12.24 K/UL — HIGH (ref 4.8–10.8)

## 2024-06-03 PROCEDURE — 99232 SBSQ HOSP IP/OBS MODERATE 35: CPT

## 2024-06-03 PROCEDURE — 76819 FETAL BIOPHYS PROFIL W/O NST: CPT | Mod: 26,59

## 2024-06-03 PROCEDURE — 99233 SBSQ HOSP IP/OBS HIGH 50: CPT | Mod: 25

## 2024-06-03 RX ORDER — INSULIN GLARGINE 100 [IU]/ML
48 INJECTION, SOLUTION SUBCUTANEOUS AT BEDTIME
Refills: 0 | Status: DISCONTINUED | OUTPATIENT
Start: 2024-06-03 | End: 2024-06-05

## 2024-06-03 RX ORDER — INSULIN GLARGINE 100 [IU]/ML
40 INJECTION, SOLUTION SUBCUTANEOUS EVERY MORNING
Refills: 0 | Status: DISCONTINUED | OUTPATIENT
Start: 2024-06-03 | End: 2024-06-05

## 2024-06-03 RX ADMIN — Medication 3 MILLILITER(S): at 09:19

## 2024-06-03 RX ADMIN — INSULIN GLARGINE 36 UNIT(S): 100 INJECTION, SOLUTION SUBCUTANEOUS at 09:16

## 2024-06-03 RX ADMIN — PANTOPRAZOLE SODIUM 40 MILLIGRAM(S): 20 TABLET, DELAYED RELEASE ORAL at 05:48

## 2024-06-03 RX ADMIN — Medication 1 TABLET(S): at 12:34

## 2024-06-03 RX ADMIN — Medication 3 MILLILITER(S): at 02:14

## 2024-06-03 RX ADMIN — LORATADINE 10 MILLIGRAM(S): 10 TABLET ORAL at 12:34

## 2024-06-03 RX ADMIN — MONTELUKAST 10 MILLIGRAM(S): 4 TABLET, CHEWABLE ORAL at 12:33

## 2024-06-03 RX ADMIN — INSULIN GLARGINE 48 UNIT(S): 100 INJECTION, SOLUTION SUBCUTANEOUS at 22:40

## 2024-06-03 RX ADMIN — Medication 10 UNIT(S): at 11:59

## 2024-06-03 RX ADMIN — Medication 10 UNIT(S): at 08:14

## 2024-06-03 RX ADMIN — Medication 3 MILLILITER(S): at 13:42

## 2024-06-03 RX ADMIN — Medication 60 MILLIGRAM(S): at 01:51

## 2024-06-03 RX ADMIN — Medication 10 UNIT(S): at 17:21

## 2024-06-03 RX ADMIN — Medication 81 MILLIGRAM(S): at 12:34

## 2024-06-03 RX ADMIN — BUDESONIDE AND FORMOTEROL FUMARATE DIHYDRATE 2 PUFF(S): 160; 4.5 AEROSOL RESPIRATORY (INHALATION) at 22:40

## 2024-06-03 RX ADMIN — BUDESONIDE AND FORMOTEROL FUMARATE DIHYDRATE 2 PUFF(S): 160; 4.5 AEROSOL RESPIRATORY (INHALATION) at 09:16

## 2024-06-03 NOTE — PROGRESS NOTE ADULT - ATTENDING COMMENTS
Impression    Asthma exacerbation - improved  29 weeks pregnant  DM  HO obesity     Impression and plan above have been altered and are my own.

## 2024-06-03 NOTE — PROGRESS NOTE ADULT - SUBJECTIVE AND OBJECTIVE BOX
PGY 2 Note  6/3/24  GA: 29w4d  Admission reason: Glycemic and Asthma control    Pt seen and examined at bedside. Still complaining of wheezing, same as yesterday. Denies ctx, lof or vaginal bleeding. Pt denies headache, dizziness, weakness, abdominal pain, palpitations, or diaphoresis. Not eating diabetic diet.    Ambulating: Yes  Voiding: Yes  Flatus: Yes  Bowel movements: Yes   Diet: Regular    MEDICATIONS  (STANDING):  albuterol/ipratropium for Nebulization 3 milliLiter(s) Nebulizer every 6 hours  aspirin  chewable 81 milliGRAM(s) Oral daily  budesonide 160 MICROgram(s)/formoterol 4.5 MICROgram(s) Inhaler 2 Puff(s) Inhalation two times a day  dextrose 10% Bolus 125 milliLiter(s) IV Bolus once  dextrose 5%. 1000 milliLiter(s) (100 mL/Hr) IV Continuous <Continuous>  dextrose 5%. 1000 milliLiter(s) (50 mL/Hr) IV Continuous <Continuous>  dextrose 50% Injectable 25 Gram(s) IV Push once  dextrose 50% Injectable 12.5 Gram(s) IV Push once  glucagon  Injectable 1 milliGRAM(s) IntraMuscular once  insulin glargine Injectable (LANTUS) 36 Unit(s) SubCutaneous every morning  insulin glargine Injectable (LANTUS) 44 Unit(s) SubCutaneous at bedtime  insulin lispro Injectable (ADMELOG) 10 Unit(s) SubCutaneous before dinner  insulin lispro Injectable (ADMELOG) 10 Unit(s) SubCutaneous before breakfast  insulin lispro Injectable (ADMELOG) 10 Unit(s) SubCutaneous before lunch  loratadine 10 milliGRAM(s) Oral daily  montelukast 10 milliGRAM(s) Oral daily  pantoprazole    Tablet 40 milliGRAM(s) Oral before breakfast  predniSONE   Tablet 60 milliGRAM(s) Oral every 24 hours  prenatal multivitamin 1 Tablet(s) Oral daily    PAST MEDICAL & SURGICAL HISTORY:  Asthma  Diabetes mellitus, type 2  Eczema  PCOS (polycystic ovarian syndrome)  Rh negative status during pregnancy  Rosacea    No significant past surgical history    Physical Exam:   Vital Signs Last 24 Hrs  T(C): 36.6 (03 Jun 2024 04:42), Max: 36.8 (02 Jun 2024 09:28)  T(F): 97.8 (03 Jun 2024 04:42), Max: 98.2 (02 Jun 2024 09:28)  HR: 87 (03 Jun 2024 04:42) (87 - 94)  BP: 101/57 (03 Jun 2024 04:42) (101/57 - 110/63)  RR: 18 (03 Jun 2024 04:42) (18 - 18)  SpO2: 98% (03 Jun 2024 04:42) (96% - 100%)    Gen: AOx3, NAD   Abdomen: soft, gravid, nontender, no palpable contractions   Extremities: no swelling or erythema noted     Labs:                        13.0   11.30 )-----------( 272      ( 02 Jun 2024 06:28 )             41.1                         13.0   13.09 )-----------( 282      ( 01 Jun 2024 06:02 )             40.3         PGY 2 Note  6/3/24  GA: 29w4d  Admission reason: Glycemic and Asthma control    Pt seen and examined at bedside. Still complaining of wheezing, same as yesterday. Denies ctx, lof or vaginal bleeding. Pt denies headache, dizziness, weakness, abdominal pain, palpitations, or diaphoresis. Not eating diabetic diet.    Ambulating: Yes  Voiding: Yes  Flatus: Yes  Bowel movements: Yes   Diet: Regular    MEDICATIONS  (STANDING):  albuterol/ipratropium for Nebulization 3 milliLiter(s) Nebulizer every 6 hours  aspirin  chewable 81 milliGRAM(s) Oral daily  budesonide 160 MICROgram(s)/formoterol 4.5 MICROgram(s) Inhaler 2 Puff(s) Inhalation two times a day  dextrose 10% Bolus 125 milliLiter(s) IV Bolus once  dextrose 5%. 1000 milliLiter(s) (100 mL/Hr) IV Continuous <Continuous>  dextrose 5%. 1000 milliLiter(s) (50 mL/Hr) IV Continuous <Continuous>  dextrose 50% Injectable 25 Gram(s) IV Push once  dextrose 50% Injectable 12.5 Gram(s) IV Push once  glucagon  Injectable 1 milliGRAM(s) IntraMuscular once  insulin glargine Injectable (LANTUS) 36 Unit(s) SubCutaneous every morning  insulin glargine Injectable (LANTUS) 44 Unit(s) SubCutaneous at bedtime  insulin lispro Injectable (ADMELOG) 10 Unit(s) SubCutaneous before dinner  insulin lispro Injectable (ADMELOG) 10 Unit(s) SubCutaneous before breakfast  insulin lispro Injectable (ADMELOG) 10 Unit(s) SubCutaneous before lunch  loratadine 10 milliGRAM(s) Oral daily  montelukast 10 milliGRAM(s) Oral daily  pantoprazole    Tablet 40 milliGRAM(s) Oral before breakfast  predniSONE   Tablet 60 milliGRAM(s) Oral every 24 hours  prenatal multivitamin 1 Tablet(s) Oral daily    PAST MEDICAL & SURGICAL HISTORY:  Asthma  Diabetes mellitus, type 2  Eczema  PCOS (polycystic ovarian syndrome)  Rh negative status during pregnancy  Rosacea    No significant past surgical history    Physical Exam:   Vital Signs Last 24 Hrs  T(C): 36.6 (03 Jun 2024 04:42), Max: 36.8 (02 Jun 2024 09:28)  T(F): 97.8 (03 Jun 2024 04:42), Max: 98.2 (02 Jun 2024 09:28)  HR: 87 (03 Jun 2024 04:42) (87 - 94)  BP: 101/57 (03 Jun 2024 04:42) (101/57 - 110/63)  RR: 18 (03 Jun 2024 04:42) (18 - 18)  SpO2: 98% (03 Jun 2024 04:42) (96% - 100%)    Gen: AOx3, NAD   Pulm: Expiratory wheezes bilaterally  Abdomen: soft, gravid, nontender, no palpable contractions   Extremities: no swelling or erythema noted     Labs:                        13.0   11.30 )-----------( 272      ( 02 Jun 2024 06:28 )             41.1                         13.0   13.09 )-----------( 282      ( 01 Jun 2024 06:02 )             40.3         PGY 2 Note  6/3/24  GA: 29w4d  Admission reason: Glycemic and Asthma control    Pt seen and examined at bedside. Still complaining of wheezing, same as yesterday. Denies ctx, lof or vaginal bleeding. Pt denies headache, dizziness, weakness, abdominal pain, palpitations, or diaphoresis. Not eating diabetic diet.    Ambulating: Yes  Voiding: Yes  Flatus: Yes  Bowel movements: Yes   Diet: Regular    MEDICATIONS  (STANDING):  albuterol/ipratropium for Nebulization 3 milliLiter(s) Nebulizer every 6 hours  aspirin  chewable 81 milliGRAM(s) Oral daily  budesonide 160 MICROgram(s)/formoterol 4.5 MICROgram(s) Inhaler 2 Puff(s) Inhalation two times a day  dextrose 10% Bolus 125 milliLiter(s) IV Bolus once  dextrose 5%. 1000 milliLiter(s) (100 mL/Hr) IV Continuous <Continuous>  dextrose 5%. 1000 milliLiter(s) (50 mL/Hr) IV Continuous <Continuous>  dextrose 50% Injectable 25 Gram(s) IV Push once  dextrose 50% Injectable 12.5 Gram(s) IV Push once  glucagon  Injectable 1 milliGRAM(s) IntraMuscular once  insulin glargine Injectable (LANTUS) 36 Unit(s) SubCutaneous every morning  insulin glargine Injectable (LANTUS) 44 Unit(s) SubCutaneous at bedtime  insulin lispro Injectable (ADMELOG) 10 Unit(s) SubCutaneous before dinner  insulin lispro Injectable (ADMELOG) 10 Unit(s) SubCutaneous before breakfast  insulin lispro Injectable (ADMELOG) 10 Unit(s) SubCutaneous before lunch  loratadine 10 milliGRAM(s) Oral daily  montelukast 10 milliGRAM(s) Oral daily  pantoprazole    Tablet 40 milliGRAM(s) Oral before breakfast  predniSONE   Tablet 60 milliGRAM(s) Oral every 24 hours  prenatal multivitamin 1 Tablet(s) Oral daily    PAST MEDICAL & SURGICAL HISTORY:  Asthma  Diabetes mellitus, type 2  Eczema  PCOS (polycystic ovarian syndrome)  Rh negative status during pregnancy  Rosacea    No significant past surgical history    Physical Exam:   Vital Signs Last 24 Hrs  T(C): 36.6 (03 Jun 2024 04:42), Max: 36.8 (02 Jun 2024 09:28)  T(F): 97.8 (03 Jun 2024 04:42), Max: 98.2 (02 Jun 2024 09:28)  HR: 87 (03 Jun 2024 04:42) (87 - 94)  BP: 101/57 (03 Jun 2024 04:42) (101/57 - 110/63)  RR: 18 (03 Jun 2024 04:42) (18 - 18)  SpO2: 98% (03 Jun 2024 04:42) (96% - 100%)    Gen: AOx3, NAD   Pulm: Expiratory wheezes bilaterally  Abdomen: soft, gravid, nontender, no palpable contractions   Extremities: no swelling or erythema noted     Labs:             13.0   12.24 )-----------( 309      ( 03 Jun 2024 06:23 )             40.1                         13.0   11.30 )-----------( 272      ( 02 Jun 2024 06:28 )             41.1                         13.0   13.09 )-----------( 282      ( 01 Jun 2024 06:02 )             40.3       06-03    134  |  102  |  14  ----------------------------<  161  4.6   |  21  |  0.5  06-02    136  |  104  |  14  ----------------------------<  193  4.3   |  20  |  0.5    Ca    9.2      03 Jun 2024 06:23   Mg    x   03 Jun 2024 06:23  Phos    x    03 Jun 2024 06:23  Ca    8.9      02 Jun 2024 06:28   Mg    x   02 Jun 2024 06:28  Phos    x    02 Jun 2024 06:28    TPro  6.3  /  Alb  3.7  /  TBili  <0.2  /  DBili  x   /  AST  29  /  ALT  131  /  AlkPhos  93  06-03-24      Lactate dehydrogenase  x     06-03-24  Uric acid    x    06-03-24  TPro  6.5  /  Alb  3.5  /  TBili  <0.2  /  DBili  x   /  AST  36  /  ALT  153  /  AlkPhos  108  06-02-24      Lactate dehydrogenase  x     06-02-24  Uric acid    x    06-02-24                          CAPILLARY BLOOD GLUCOSE      POCT Blood Glucose.: 200 mg/dL (03 Jun 2024 08:04)  POCT Blood Glucose.: 137 mg/dL (02 Jun 2024 21:38)  POCT Blood Glucose.: 124 mg/dL (02 Jun 2024 17:04)  POCT Blood Glucose.: 142 mg/dL (02 Jun 2024 11:48)

## 2024-06-03 NOTE — PROGRESS NOTE ADULT - ASSESSMENT
34yo  @29w4, with shortness of breath, likely asthma exacerbation low suspicion for PE/VTE, complicated by poorly controlled T2DM, LGA fetus, HSV with outbreak in pregnancy.    #Shortness of breath  - Less likely DVT due to preliminarily negative LE Dopplers  - Less likely influenza or COVID due to negative PCR  - Chest x ray was clear    Likely asthma exacerbation.  - Vitals per routine,   - Prednisone 60 mg q24h for the time being,   - c/w Symbicort and singulair qd   - albuterol prn  - Claritin 10mg qD  - PPI  - Duonebs q6h  - s/p Azithromycin.  - Daily peak flow.   - Appreciate Pulmonary input.    #T2DM, poorly controlled   - FS q4h  - Current insulin regimen: Glargine 36u AM/ 44u PM, lispro 10/10/10 ordered,   - Fingersticks may rise due to Prednisone.  - Cardiology as an outpatient.    #HSV (+)   - discontinued valtrex 1 gm daily due to transaminitis  - reports last genital outbreak was March    #Obesity - BMI 41  - c/w ASA 81 mg    #AST/ALT elevated  - AST/ALT 50/193  - Hepatitis panel negative.  - repeat in AM  - f/u coagulation profile.  - s/p RUQ ultrasound    #Rh NEG  - Received rhogam 24 due to bleeding  - Follow up [facility] cell free fetal DNA screening for Rh. Awaiting results

## 2024-06-03 NOTE — PROGRESS NOTE ADULT - SUBJECTIVE AND OBJECTIVE BOX
Patient is a 33y old  Female who presents with a chief complaint of asthma (29 May 2024 09:08)        SUBJECTIVE: Pulm recalled for wheezing. Pt on RA and has improvement in wheezing on exam      REVIEW OF SYSTEMS:  See HPI    PHYSICAL EXAM  Vital Signs Last 24 Hrs  T(C): 36.6 (03 Jun 2024 04:42), Max: 36.8 (02 Jun 2024 09:28)  T(F): 97.8 (03 Jun 2024 04:42), Max: 98.2 (02 Jun 2024 09:28)  HR: 87 (03 Jun 2024 04:42) (87 - 94)  BP: 101/57 (03 Jun 2024 04:42) (101/57 - 110/63)  BP(mean): --  RR: 18 (03 Jun 2024 04:42) (18 - 18)  SpO2: 98% (03 Jun 2024 04:42) (96% - 100%)    Parameters below as of 02 Jun 2024 16:38  Patient On (Oxygen Delivery Method): room air        General: In NAD     Respiratory: BL wheezing improved  Cardiovascular: Regular  Gastrointestinal: Soft.  Musculoskeletal: No clubbing.  moves all extremities.  Full range of motion    Skin: Warm.  Intact  Neurological: No motor or sensory deficit    LABS:                          13.0   12.24 )-----------( 309      ( 03 Jun 2024 06:23 )             40.1                                               06-03    134<L>  |  102  |  14  ----------------------------<  161<H>  4.6   |  21  |  0.5<L>    Ca    9.2      03 Jun 2024 06:23    TPro  6.3  /  Alb  3.7  /  TBili  <0.2  /  DBili  x   /  AST  29  /  ALT  131<H>  /  AlkPhos  93  06-03                                             Urinalysis Basic - ( 03 Jun 2024 06:23 )    Color: x / Appearance: x / SG: x / pH: x  Gluc: 161 mg/dL / Ketone: x  / Bili: x / Urobili: x   Blood: x / Protein: x / Nitrite: x   Leuk Esterase: x / RBC: x / WBC x   Sq Epi: x / Non Sq Epi: x / Bacteria: x                                                  LIVER FUNCTIONS - ( 03 Jun 2024 06:23 )  Alb: 3.7 g/dL / Pro: 6.3 g/dL / ALK PHOS: 93 U/L / ALT: 131 U/L / AST: 29 U/L / GGT: x                                                                                                MEDICATIONS  (STANDING):  albuterol/ipratropium for Nebulization 3 milliLiter(s) Nebulizer every 6 hours  aspirin  chewable 81 milliGRAM(s) Oral daily  budesonide 160 MICROgram(s)/formoterol 4.5 MICROgram(s) Inhaler 2 Puff(s) Inhalation two times a day  dextrose 10% Bolus 125 milliLiter(s) IV Bolus once  dextrose 5%. 1000 milliLiter(s) (100 mL/Hr) IV Continuous <Continuous>  dextrose 5%. 1000 milliLiter(s) (50 mL/Hr) IV Continuous <Continuous>  dextrose 50% Injectable 25 Gram(s) IV Push once  dextrose 50% Injectable 12.5 Gram(s) IV Push once  glucagon  Injectable 1 milliGRAM(s) IntraMuscular once  insulin glargine Injectable (LANTUS) 36 Unit(s) SubCutaneous every morning  insulin glargine Injectable (LANTUS) 44 Unit(s) SubCutaneous at bedtime  insulin lispro Injectable (ADMELOG) 10 Unit(s) SubCutaneous before dinner  insulin lispro Injectable (ADMELOG) 10 Unit(s) SubCutaneous before breakfast  insulin lispro Injectable (ADMELOG) 10 Unit(s) SubCutaneous before lunch  loratadine 10 milliGRAM(s) Oral daily  montelukast 10 milliGRAM(s) Oral daily  pantoprazole    Tablet 40 milliGRAM(s) Oral before breakfast  predniSONE   Tablet 60 milliGRAM(s) Oral every 24 hours  prenatal multivitamin 1 Tablet(s) Oral daily    MEDICATIONS  (PRN):  albuterol    90 MICROgram(s) HFA Inhaler 2 Puff(s) Inhalation every 6 hours PRN Shortness of Breath and/or Wheezing  dextrose Oral Gel 15 Gram(s) Oral once PRN Blood Glucose LESS THAN 70 milliGRAM(s)/deciliter

## 2024-06-03 NOTE — PROGRESS NOTE ADULT - SUBJECTIVE AND OBJECTIVE BOX
[facility] Staff    Asthma    Bedside ultrasound:  vertex fetal heart rate 138 beats per minute.  Deepest vertical pocket 5.3 cm.  Biophysical profile .    Reassuring  testing.    Reza Sheppard MD

## 2024-06-03 NOTE — PROGRESS NOTE ADULT - ATTENDING COMMENTS
[facility] Staff    I saw and evaluated Ms. TAYLOR JAY  with Dr. Rivas.  Ms. TAYLOR JAY reports positive fetal movement and no vaginal bleeding.  She feels the same today as she did yesterday.    General:  Ms. TAYLOR JAY is lying in bed.  She appears comfortable.    Vital Signs Last 24 Hrs  T(C): 36.9 (2024 08:43), Max: 36.9 (2024 08:43)  T(F): 98.4 (2024 08:43), Max: 98.4 (2024 08:43)  HR: 91 (2024 08:43) (87 - 92)  BP:  (2024 08:43) ( - 110/63)  BP(mean): --  RR: 18 (2024 08:43) (18 - 18)  SpO2: 98% (2024 08:43) (98% - 100%)    Cardiovascular:  Normal S1 S2.  No murmurs.  Pulmonary:  Wheezing bilaterally.  Abdomen:  Soft, nontender, nondistended, no rebound, no guarding.  Extremities:  Nontender calves.  Neurology:  Deep tendon reflexes 2+ bilaterally.      Ms. Jay is a 32yo  @ 29w5d, with shortness of breath, likely asthma exacerbation low suspicion for PE/VTE, complicated by poorly controlled T2DM, LGA fetus, HSV with outbreak in pregnancy.    #Shortness of breath  - Less likely DVT due to preliminarily negative LE Dopplers  - Less likely influenza or COVID due to negative PCR  - Chest x ray was clear    Likely asthma exacerbation.  - Vitals per routine,   - Prednisone 60 mg q24h for the time being  - c/w Symbicort and singulair qd.  Consider increasing Symbicort dosing.  - albuterol prn  - Claritin 10mg qD  - PPI  - Duonebs q6h  - s/p Azithromycin.  - Daily peak flow.  Today was 400.  - Appreciate Pulmonary input.     #T2DM, poorly controlled   - FS q4h.  She states she had apple juice at around 2 AM.   - Current insulin regimen: Glargine 36u AM/ 44u PM, lispro 8/8/10.    - We will increase the regimen to Glargine 40u AM/ 48u PM, lispro 10/10/10.   - Fingersticks may rise due to Prednisone.  - Cardiology as an outpatient.    #HSV (+)   - discontinued valtrex 1 gm daily due to transaminitis  - reports last genital outbreak was March    #Obesity - BMI 41  - c/w ASA 81 mg    #AST/ALT elevated  - AST/ALT 50/193-->36/153-->29/131  - Coagulation profile is within normal limits.   - Hepatitis panel negative  - repeat in AM  - Tylenol should not be used for the time being.  - s/p RUQ ultrasound:  Cholelithiasis without sonographic evidence of cholecystitis.     Lesion within the right hepatic lobe measuring up to 1.2 cm, likely hemangioma.     Reza Sheppard MD .

## 2024-06-03 NOTE — PROGRESS NOTE ADULT - ASSESSMENT
Impression    Asthma exacerbation - improved  29 weeks pregnant  DM  HO obesity     Plan    CXR reviewed; no infiltrates   RVP (-)  Pt is on RA; Wheezing BL has improved based on prior examination  Had purulent secretions - finish 5 days of abx  Symbicort 160mcg 2puffs BID  Prednisone 40mg, taper over 10 days: 40mg for 5 days then 20mg for 5 days - continue to taper, finish steroid course  Montelukast daily   Albuterol PRN  Peak flow measurement for personal best  On room air   Patient has an improvement in her wheezing, it may take several weeks to fully resolve,   assess respiratory status today while ambulating  O/p follow up with pulmonary: Dr Nash  Impression    Asthma exacerbation - improved  29 weeks pregnant  DM  HO obesity     Plan    CXR reviewed; no infiltrates   RVP (-)  Pt is on RA; Wheezing BL has improved based on prior examination  S/P azithromycin course   Symbicort 160mcg 2puffs BID  Prednisone 40mg, taper over 10 days: 40mg for 5 days then 20mg for 5 days - continue to taper, finish steroid course  Montelukast daily   Albuterol PRN  Peak flow measurement for personal best  On room air   O/p follow up with pulmonary: Dr Nash  Recall as needed

## 2024-06-04 ENCOUNTER — TRANSCRIPTION ENCOUNTER (OUTPATIENT)
Age: 34
End: 2024-06-04

## 2024-06-04 LAB
ALBUMIN SERPL ELPH-MCNC: 3.7 G/DL — SIGNIFICANT CHANGE UP (ref 3.5–5.2)
ALP SERPL-CCNC: 97 U/L — SIGNIFICANT CHANGE UP (ref 30–115)
ALT FLD-CCNC: 119 U/L — HIGH (ref 0–41)
ANION GAP SERPL CALC-SCNC: 13 MMOL/L — SIGNIFICANT CHANGE UP (ref 7–14)
AST SERPL-CCNC: 28 U/L — SIGNIFICANT CHANGE UP (ref 0–41)
BASOPHILS # BLD AUTO: 0.03 K/UL — SIGNIFICANT CHANGE UP (ref 0–0.2)
BASOPHILS NFR BLD AUTO: 0.2 % — SIGNIFICANT CHANGE UP (ref 0–1)
BILIRUB SERPL-MCNC: 0.4 MG/DL — SIGNIFICANT CHANGE UP (ref 0.2–1.2)
BLD GP AB SCN SERPL QL: SIGNIFICANT CHANGE UP
BUN SERPL-MCNC: 14 MG/DL — SIGNIFICANT CHANGE UP (ref 10–20)
CALCIUM SERPL-MCNC: 9.6 MG/DL — SIGNIFICANT CHANGE UP (ref 8.4–10.5)
CHLORIDE SERPL-SCNC: 102 MMOL/L — SIGNIFICANT CHANGE UP (ref 98–110)
CO2 SERPL-SCNC: 20 MMOL/L — SIGNIFICANT CHANGE UP (ref 17–32)
CREAT SERPL-MCNC: 0.5 MG/DL — LOW (ref 0.7–1.5)
EGFR: 125 ML/MIN/1.73M2 — SIGNIFICANT CHANGE UP
EOSINOPHIL # BLD AUTO: 0.03 K/UL — SIGNIFICANT CHANGE UP (ref 0–0.7)
EOSINOPHIL NFR BLD AUTO: 0.2 % — SIGNIFICANT CHANGE UP (ref 0–8)
GLUCOSE BLDC GLUCOMTR-MCNC: 147 MG/DL — HIGH (ref 70–99)
GLUCOSE BLDC GLUCOMTR-MCNC: 181 MG/DL — HIGH (ref 70–99)
GLUCOSE BLDC GLUCOMTR-MCNC: 187 MG/DL — HIGH (ref 70–99)
GLUCOSE BLDC GLUCOMTR-MCNC: 191 MG/DL — HIGH (ref 70–99)
GLUCOSE BLDC GLUCOMTR-MCNC: 75 MG/DL — SIGNIFICANT CHANGE UP (ref 70–99)
GLUCOSE BLDC GLUCOMTR-MCNC: 92 MG/DL — SIGNIFICANT CHANGE UP (ref 70–99)
GLUCOSE SERPL-MCNC: 156 MG/DL — HIGH (ref 70–99)
HCT VFR BLD CALC: 41.9 % — SIGNIFICANT CHANGE UP (ref 37–47)
HGB BLD-MCNC: 13.6 G/DL — SIGNIFICANT CHANGE UP (ref 12–16)
IMM GRANULOCYTES NFR BLD AUTO: 0.7 % — HIGH (ref 0.1–0.3)
LYMPHOCYTES # BLD AUTO: 0.9 K/UL — LOW (ref 1.2–3.4)
LYMPHOCYTES # BLD AUTO: 6.2 % — LOW (ref 20.5–51.1)
MCHC RBC-ENTMCNC: 26.6 PG — LOW (ref 27–31)
MCHC RBC-ENTMCNC: 32.5 G/DL — SIGNIFICANT CHANGE UP (ref 32–37)
MCV RBC AUTO: 81.8 FL — SIGNIFICANT CHANGE UP (ref 81–99)
MONOCYTES # BLD AUTO: 0.37 K/UL — SIGNIFICANT CHANGE UP (ref 0.1–0.6)
MONOCYTES NFR BLD AUTO: 2.5 % — SIGNIFICANT CHANGE UP (ref 1.7–9.3)
NEUTROPHILS # BLD AUTO: 13.18 K/UL — HIGH (ref 1.4–6.5)
NEUTROPHILS NFR BLD AUTO: 90.2 % — HIGH (ref 42.2–75.2)
NRBC # BLD: 0 /100 WBCS — SIGNIFICANT CHANGE UP (ref 0–0)
PLATELET # BLD AUTO: 295 K/UL — SIGNIFICANT CHANGE UP (ref 130–400)
PMV BLD: 10 FL — SIGNIFICANT CHANGE UP (ref 7.4–10.4)
POTASSIUM SERPL-MCNC: 4.5 MMOL/L — SIGNIFICANT CHANGE UP (ref 3.5–5)
POTASSIUM SERPL-SCNC: 4.5 MMOL/L — SIGNIFICANT CHANGE UP (ref 3.5–5)
PROT SERPL-MCNC: 6.7 G/DL — SIGNIFICANT CHANGE UP (ref 6–8)
RBC # BLD: 5.12 M/UL — SIGNIFICANT CHANGE UP (ref 4.2–5.4)
RBC # FLD: 14 % — SIGNIFICANT CHANGE UP (ref 11.5–14.5)
SODIUM SERPL-SCNC: 135 MMOL/L — SIGNIFICANT CHANGE UP (ref 135–146)
WBC # BLD: 14.61 K/UL — HIGH (ref 4.8–10.8)
WBC # FLD AUTO: 14.61 K/UL — HIGH (ref 4.8–10.8)

## 2024-06-04 PROCEDURE — 99232 SBSQ HOSP IP/OBS MODERATE 35: CPT

## 2024-06-04 RX ORDER — IPRATROPIUM BROMIDE AND ALBUTEROL SULFATE 2.5; .5 MG/3ML; MG/3ML
0.5-2.5 (3) SOLUTION RESPIRATORY (INHALATION) 4 TIMES DAILY
Qty: 4 | Refills: 11 | Status: ACTIVE | COMMUNITY
Start: 2024-06-04 | End: 1900-01-01

## 2024-06-04 RX ORDER — IPRATROPIUM/ALBUTEROL SULFATE 18-103MCG
3 AEROSOL WITH ADAPTER (GRAM) INHALATION
Qty: 0 | Refills: 0 | DISCHARGE
Start: 2024-06-04

## 2024-06-04 RX ORDER — MONTELUKAST 4 MG/1
1 TABLET, CHEWABLE ORAL
Qty: 0 | Refills: 0 | DISCHARGE
Start: 2024-06-04

## 2024-06-04 RX ORDER — PREDNISONE 10 MG/1
10 TABLET ORAL
Qty: 110 | Refills: 0 | Status: ACTIVE | COMMUNITY
Start: 2024-06-04 | End: 1900-01-01

## 2024-06-04 RX ORDER — CHLORHEXIDINE GLUCONATE 213 G/1000ML
1 SOLUTION TOPICAL DAILY
Refills: 0 | Status: DISCONTINUED | OUTPATIENT
Start: 2024-06-04 | End: 2024-06-05

## 2024-06-04 RX ORDER — BUDESONIDE AND FORMOTEROL FUMARATE DIHYDRATE 160; 4.5 UG/1; UG/1
2 AEROSOL RESPIRATORY (INHALATION)
Qty: 0 | Refills: 0 | DISCHARGE
Start: 2024-06-04

## 2024-06-04 RX ORDER — PANTOPRAZOLE SODIUM 20 MG/1
1 TABLET, DELAYED RELEASE ORAL
Qty: 0 | Refills: 0 | DISCHARGE
Start: 2024-06-04

## 2024-06-04 RX ORDER — LORATADINE 10 MG/1
1 TABLET ORAL
Qty: 0 | Refills: 0 | DISCHARGE
Start: 2024-06-04

## 2024-06-04 RX ORDER — BUDESONIDE AND FORMOTEROL FUMARATE DIHYDRATE 160; 4.5 UG/1; UG/1
160-4.5 AEROSOL RESPIRATORY (INHALATION) TWICE DAILY
Qty: 1 | Refills: 6 | Status: ACTIVE | COMMUNITY
Start: 1900-01-01 | End: 1900-01-01

## 2024-06-04 RX ORDER — DEXTROSE 3.75 G
4 TABLET,CHEWABLE ORAL
Qty: 1 | Refills: 2 | Status: ACTIVE | COMMUNITY
Start: 2024-06-04 | End: 1900-01-01

## 2024-06-04 RX ORDER — SYRINGE-NEEDLE,INSULIN,0.5 ML 27GX1/2"
28G X 1/2" SYRINGE, EMPTY DISPOSABLE MISCELLANEOUS
Qty: 1 | Refills: 6 | Status: ACTIVE | COMMUNITY
Start: 2024-06-04 | End: 1900-01-01

## 2024-06-04 RX ORDER — GLUCAGON 1 MG
1 KIT INJECTION
Qty: 1 | Refills: 1 | Status: ACTIVE | COMMUNITY
Start: 2024-06-04 | End: 1900-01-01

## 2024-06-04 RX ORDER — PEN NEEDLE, DIABETIC 32GX 5/32"
32G X 4 MM NEEDLE, DISPOSABLE MISCELLANEOUS
Qty: 1 | Refills: 6 | Status: ACTIVE | COMMUNITY
Start: 2024-06-04 | End: 1900-01-01

## 2024-06-04 RX ORDER — BLOOD-GLUCOSE METER
KIT MISCELLANEOUS
Qty: 1 | Refills: 0 | Status: ACTIVE | COMMUNITY
Start: 2024-06-04 | End: 1900-01-01

## 2024-06-04 RX ADMIN — INSULIN GLARGINE 40 UNIT(S): 100 INJECTION, SOLUTION SUBCUTANEOUS at 08:33

## 2024-06-04 RX ADMIN — Medication 10 UNIT(S): at 08:34

## 2024-06-04 RX ADMIN — Medication 3 MILLILITER(S): at 13:31

## 2024-06-04 RX ADMIN — Medication 60 MILLIGRAM(S): at 01:02

## 2024-06-04 RX ADMIN — INSULIN GLARGINE 48 UNIT(S): 100 INJECTION, SOLUTION SUBCUTANEOUS at 21:39

## 2024-06-04 RX ADMIN — BUDESONIDE AND FORMOTEROL FUMARATE DIHYDRATE 2 PUFF(S): 160; 4.5 AEROSOL RESPIRATORY (INHALATION) at 08:34

## 2024-06-04 RX ADMIN — PANTOPRAZOLE SODIUM 40 MILLIGRAM(S): 20 TABLET, DELAYED RELEASE ORAL at 06:13

## 2024-06-04 RX ADMIN — Medication 10 UNIT(S): at 12:14

## 2024-06-04 RX ADMIN — LORATADINE 10 MILLIGRAM(S): 10 TABLET ORAL at 12:13

## 2024-06-04 RX ADMIN — Medication 1 TABLET(S): at 12:13

## 2024-06-04 RX ADMIN — MONTELUKAST 10 MILLIGRAM(S): 4 TABLET, CHEWABLE ORAL at 12:13

## 2024-06-04 RX ADMIN — Medication 81 MILLIGRAM(S): at 12:13

## 2024-06-04 RX ADMIN — Medication 3 MILLILITER(S): at 09:39

## 2024-06-04 RX ADMIN — Medication 3 MILLILITER(S): at 19:57

## 2024-06-04 RX ADMIN — BUDESONIDE AND FORMOTEROL FUMARATE DIHYDRATE 2 PUFF(S): 160; 4.5 AEROSOL RESPIRATORY (INHALATION) at 20:38

## 2024-06-04 RX ADMIN — Medication 10 UNIT(S): at 17:31

## 2024-06-04 NOTE — CHART NOTE - NSCHARTNOTEFT_GEN_A_CORE
PGY 2 Note    Pt seen at bedside for reported fever earlier this evening. Pt reports earlier this evening she felt chills so she took her temperature on her home thermometer. Reports it was 101F. Repeat temp of hospital monitor 99.5. At this time, RVP panel was collected. Pt reports she is now feeling improved. Continues to have some coughing and wheezing, but overall breathing comfortably. Denies N/V, diarrhea, constipation, dysuria.     Vital Signs Last 24 Hrs  T(C): 37.5 (04 Jun 2024 19:13), Max: 37.7 (04 Jun 2024 16:14)  T(F): 99.5 (04 Jun 2024 19:13), Max: 99.8 (04 Jun 2024 16:14)  HR: 107 (04 Jun 2024 19:13) (85 - 118)  BP: 110/66 (04 Jun 2024 19:13) (100/58 - 131/73)  BP(mean): --  RR: 18 (04 Jun 2024 19:13) (18 - 18)  SpO2: 99% (04 Jun 2024 19:13) (96% - 100%)    Parameters below as of 04 Jun 2024 19:13  Patient On (Oxygen Delivery Method): room air    Exam:  AOA x3, NAAD  Heart: RRR  Lungs: good air entry in all lung fields, mild expiratory wheezes in upper quadrants.   Abdomen: soft, NT    Plan  -f/u RVP panel  -R/B/A CXR discussed, consent signed  -f/u CXR  -monitor vitals  -routine care    d/w Dr. Carson
PGY2 Note    Biophysical profile    Position: vertex  Placenta: anterior  MVP: 6.2cm  BPP: 8/8    Dr. Anne aware
MFM procedure note    Non stress test    Date: 5/28/2024  Start Time: 1135  End Time: 1234    Baseline: 125 beats per minute  Variability:  moderate  Accelerations: positive  Decelerations: negative    Tocometer: none    Reactive NST    MFM to be made aware.

## 2024-06-04 NOTE — DISCHARGE NOTE ANTEPARTUM - HOSPITAL COURSE
Patient admitted for acute asthma exacerbation, asthma medications optimized on stay. LFTs noted to be increasing, Valtrex stopped, LFT downtrending. Diabetes worsening with steroids, new insulin regmin started.     34yo  @29w4, with shortness of breath, likely asthma exacerbation low suspicion for PE/VTE, complicated by poorly controlled T2DM, LGA fetus, HSV with outbreak in pregnancy.    #Shortness of breath  - Less likely DVT due to preliminarily negative LE Dopplers  - Less likely influenza or COVID due to negative PCR  - Chest x ray was clear    Likely asthma exacerbation.  - Vitals per routine,   - Prednisone 60 mg q24h for the time being,   - c/w Symbicort and singulair qd   - albuterol prn  - Claritin 10mg qD  - PPI  - Duonebs q6h  - s/p Azithromycin.  - Daily peak flow.   - Appreciate Pulmonary input.    #T2DM, poorly controlled   - FS q4h  - Current insulin regimen: Glargine 40u AM/ 48u PM, lispro 10/10/10   - Fingersticks may rise due to Prednisone.  - Cardiology as an outpatient.    #HSV (+)   - discontinued valtrex 1 gm daily due to transaminitis  - reports last genital outbreak was March    #Obesity - BMI 41  - c/w ASA 81 mg    #AST/ALT elevated  - AST/ALT 50/193  - Hepatitis panel negative.  - repeat in AM  - f/u coagulation profile.  - s/p RUQ ultrasound    #Rh NEG  - Received rhogam 24 due to bleeding  - Follow up [facility] cell free fetal DNA screening for Rh. Awaiting results

## 2024-06-04 NOTE — DISCHARGE NOTE ANTEPARTUM - CARE PLAN
1 Principal Discharge DX:	Asthma flare  Assessment and plan of treatment:	Continue all medications started in the hospital.   Taper prednisone pills - instructions given with medication  Secondary Diagnosis:	Diabetes  Assessment and plan of treatment:	Levemir 40units in the AM, 48units in the PM  LISPRO 10unit 3 times a day with meals (breakfast, lunch, and dinner)  Continue to check FS 4 times a day, Fasting, and 2hours after meals. Log your FS to review in clinic

## 2024-06-04 NOTE — DISCHARGE NOTE ANTEPARTUM - MEDICATION SUMMARY - MEDICATIONS TO STOP TAKING
I will STOP taking the medications listed below when I get home from the hospital:    cephalexin 500 mg oral capsule  -- 1 cap(s) by mouth 4 times a day    valACYclovir 1 g oral tablet  -- 1 tab(s) by mouth once a day

## 2024-06-04 NOTE — DISCHARGE NOTE ANTEPARTUM - CARE PROVIDER_API CALL
Donis Richey  Obstetrics and Gynecology  [address] [place] [address]  Phone: [phone]  Fax: [phone]  Follow Up Time:     Rima Carson  Maternal/Fetal Medicine  [address] [place] [address]  Phone: [phone]  Fax: [phone]  Follow Up Time:

## 2024-06-04 NOTE — DISCHARGE NOTE ANTEPARTUM - PATIENT PORTAL LINK FT
You can access the FollowMyHealth Patient Portal offered by [facility] by registering at the following website: [email or web address]. By joining [email or web address] [email or web address] portal, you will also be able to view your health information using other applications (apps) compatible with our system.

## 2024-06-04 NOTE — PROGRESS NOTE ADULT - SUBJECTIVE AND OBJECTIVE BOX
PGY 2 Note  6/4/24  GA: 29w6d    Pt seen and examined at bedside. Still complaining of wheezing, same as yesterday. Denies ctx, lof or vaginal bleeding. Pt denies headache, dizziness, weakness, abdominal pain, palpitations, or diaphoresis. Not eating diabetic diet.    Ambulating: Yes  Voiding: Yes  Flatus: Yes  Bowel movements: Yes   Diet: Regular    MEDICATIONS  (STANDING):  albuterol/ipratropium for Nebulization 3 milliLiter(s) Nebulizer every 6 hours  aspirin  chewable 81 milliGRAM(s) Oral daily  budesonide 160 MICROgram(s)/formoterol 4.5 MICROgram(s) Inhaler 2 Puff(s) Inhalation two times a day  chlorhexidine 2% Cloths 1 Application(s) Topical daily  dextrose 10% Bolus 125 milliLiter(s) IV Bolus once  dextrose 5%. 1000 milliLiter(s) (100 mL/Hr) IV Continuous <Continuous>  dextrose 5%. 1000 milliLiter(s) (50 mL/Hr) IV Continuous <Continuous>  dextrose 50% Injectable 25 Gram(s) IV Push once  dextrose 50% Injectable 12.5 Gram(s) IV Push once  glucagon  Injectable 1 milliGRAM(s) IntraMuscular once  insulin glargine Injectable (LANTUS) 40 Unit(s) SubCutaneous every morning  insulin glargine Injectable (LANTUS) 48 Unit(s) SubCutaneous at bedtime  insulin lispro Injectable (ADMELOG) 10 Unit(s) SubCutaneous before dinner  insulin lispro Injectable (ADMELOG) 10 Unit(s) SubCutaneous before breakfast  insulin lispro Injectable (ADMELOG) 10 Unit(s) SubCutaneous before lunch  loratadine 10 milliGRAM(s) Oral daily  montelukast 10 milliGRAM(s) Oral daily  pantoprazole    Tablet 40 milliGRAM(s) Oral before breakfast  predniSONE   Tablet 60 milliGRAM(s) Oral every 24 hours  prenatal multivitamin 1 Tablet(s) Oral daily    PAST MEDICAL & SURGICAL HISTORY:  Asthma  Diabetes mellitus, type 2  Eczema  PCOS (polycystic ovarian syndrome  Rh negative status during pregnancy  Rosacea    No significant past surgical history    Physical Exam:   Vital Signs Last 24 Hrs  T(C): 36.7 (04 Jun 2024 08:49), Max: 36.7 (04 Jun 2024 08:49)  T(F): 98.1 (04 Jun 2024 08:49), Max: 98.1 (04 Jun 2024 08:49)  HR: 85 (04 Jun 2024 08:49) (85 - 94)  BP: 115/69 (04 Jun 2024 08:49) (100/58 - 131/73)  RR: 18 (04 Jun 2024 08:49) (18 - 18)  SpO2: 97% (04 Jun 2024 08:49) (96% - 100%)    Gen: AOx3, NAD   Cardio: RRR, S1S2, no m/r/g  Pulm: Wheezing bilaterally  Abdomen: soft, gravid, nontender, no palpable contractions   Extremities: no swelling or erythema noted       BSS: BPP 8/8 MVP 5.2cm, vtx    Labs:                        13.6   14.61 )-----------( 295      ( 04 Jun 2024 06:43 )             41.9                         13.0   12.24 )-----------( 309      ( 03 Jun 2024 06:23 )             40.1           PGY 2 Note  6/4/24  GA: 29w6d    Pt seen and examined at bedside. Still complaining of wheezing, same as yesterday. Denies ctx, lof or vaginal bleeding. Pt denies headache, dizziness, weakness, abdominal pain, palpitations, or diaphoresis. Not eating diabetic diet.    Ambulating: Yes  Voiding: Yes  Flatus: Yes  Bowel movements: Yes   Diet: Regular    MEDICATIONS  (STANDING):  albuterol/ipratropium for Nebulization 3 milliLiter(s) Nebulizer every 6 hours  aspirin  chewable 81 milliGRAM(s) Oral daily  budesonide 160 MICROgram(s)/formoterol 4.5 MICROgram(s) Inhaler 2 Puff(s) Inhalation two times a day  chlorhexidine 2% Cloths 1 Application(s) Topical daily  dextrose 10% Bolus 125 milliLiter(s) IV Bolus once  dextrose 5%. 1000 milliLiter(s) (100 mL/Hr) IV Continuous <Continuous>  dextrose 5%. 1000 milliLiter(s) (50 mL/Hr) IV Continuous <Continuous>  dextrose 50% Injectable 25 Gram(s) IV Push once  dextrose 50% Injectable 12.5 Gram(s) IV Push once  glucagon  Injectable 1 milliGRAM(s) IntraMuscular once  insulin glargine Injectable (LANTUS) 40 Unit(s) SubCutaneous every morning  insulin glargine Injectable (LANTUS) 48 Unit(s) SubCutaneous at bedtime  insulin lispro Injectable (ADMELOG) 10 Unit(s) SubCutaneous before dinner  insulin lispro Injectable (ADMELOG) 10 Unit(s) SubCutaneous before breakfast  insulin lispro Injectable (ADMELOG) 10 Unit(s) SubCutaneous before lunch  loratadine 10 milliGRAM(s) Oral daily  montelukast 10 milliGRAM(s) Oral daily  pantoprazole    Tablet 40 milliGRAM(s) Oral before breakfast  predniSONE   Tablet 60 milliGRAM(s) Oral every 24 hours  prenatal multivitamin 1 Tablet(s) Oral daily    PAST MEDICAL & SURGICAL HISTORY:  Asthma  Diabetes mellitus, type 2  Eczema  PCOS (polycystic ovarian syndrome  Rh negative status during pregnancy  Rosacea    No significant past surgical history    Physical Exam:   Vital Signs Last 24 Hrs  T(C): 36.7 (04 Jun 2024 08:49), Max: 36.7 (04 Jun 2024 08:49)  T(F): 98.1 (04 Jun 2024 08:49), Max: 98.1 (04 Jun 2024 08:49)  HR: 85 (04 Jun 2024 08:49) (85 - 94)  BP: 115/69 (04 Jun 2024 08:49) (100/58 - 131/73)  RR: 18 (04 Jun 2024 08:49) (18 - 18)  SpO2: 97% (04 Jun 2024 08:49) (96% - 100%)    Gen: AOx3, NAD   Cardio: RRR, S1S2, no m/r/g  Pulm: Wheezing bilaterally  Abdomen: soft, gravid, nontender, no palpable contractions   Extremities: no swelling or erythema noted       BSS: BPP 8/8 MVP 5.2cm, vtx    Labs:                         13.6   14.61 )-----------( 295      ( 04 Jun 2024 06:43 )             41.9                         13.0   12.24 )-----------( 309      ( 03 Jun 2024 06:23 )             40.1                         13.0   11.30 )-----------( 272      ( 02 Jun 2024 06:28 )             41.1       06-04    135  |  102  |  14  ----------------------------<  156  4.5   |  20  |  0.5  06-03    134  |  102  |  14  ----------------------------<  161  4.6   |  21  |  0.5    Ca    9.6      04 Jun 2024 06:43   Mg    x   04 Jun 2024 06:43  Phos    x    04 Jun 2024 06:43  Ca    9.2      03 Jun 2024 06:23   Mg    x   03 Jun 2024 06:23  Phos    x    03 Jun 2024 06:23    TPro  6.7  /  Alb  3.7  /  TBili  0.4  /  DBili  x   /  AST  28  /  ALT  119  /  AlkPhos  97  06-04-24      Lactate dehydrogenase  x     06-04-24  Uric acid    x    06-04-24  TPro  6.3  /  Alb  3.7  /  TBili  <0.2  /  DBili  x   /  AST  29  /  ALT  131  /  AlkPhos  93  06-03-24      Lactate dehydrogenase  x     06-03-24  Uric acid    x    06-03-24    CAPILLARY BLOOD GLUCOSE      POCT Blood Glucose.: 147 mg/dL (04 Jun 2024 07:23)  POCT Blood Glucose.: 130 mg/dL (03 Jun 2024 22:38)

## 2024-06-04 NOTE — PROGRESS NOTE ADULT - ASSESSMENT
32yo  @29w6d, with shortness of breath, likely asthma exacerbation low suspicion for PE/VTE, complicated by poorly controlled T2DM, LGA fetus, HSV with outbreak in pregnancy.    - anticipate d/c today    #Shortness of breath  - Less likely DVT due to preliminarily negative LE Dopplers  - Less likely influenza or COVID due to negative PCR  - Chest x ray was clear    Likely asthma exacerbation.  - Vitals per routine,   - Prednisone 60 mg q24h for the time being, plan for taper on discharge  - c/w Symbicort and singulair qd   - albuterol prn  - Claritin 10mg qD  - PPI  - Duonebs q6h  - s/p Azithromycin.  - Daily peak flow.   - Appreciate Pulmonary input.    #T2DM, poorly controlled   - FS q4h  - Current insulin regimen: Glargine 36u AM/ 44u PM, lispro 10/10/10 ordered,   - Fingersticks may rise due to Prednisone.  - Cardiology as an outpatient.    #HSV (+)   - discontinued valtrex 1 gm daily due to transaminitis  - reports last genital outbreak was March    #Obesity - BMI 41  - c/w ASA 81 mg    #AST/ALT elevated  - AST/ALT 50/193  - Hepatitis panel negative.  - repeat in AM  - f/u coagulation profile.  - s/p RUQ ultrasound    #Rh NEG  - Received rhogam 24 due to bleeding  - Follow up [facility] cell free fetal DNA screening for Rh. Awaiting results 34yo  @29w6d, with shortness of breath, likely asthma exacerbation low suspicion for PE/VTE, complicated by poorly controlled T2DM, LGA fetus, HSV with outbreak in pregnancy.    - anticipate d/c today    #Shortness of breath  - Less likely DVT due to preliminarily negative LE Dopplers  - Less likely influenza or COVID due to negative PCR  - Chest x ray was clear    Likely asthma exacerbation.  - Vitals per routine,   - Prednisone 60 mg q24h for the time being, plan for taper on discharge  - c/w Symbicort and singulair qd   - albuterol prn  - Claritin 10mg qD  - PPI  - Duonebs q6h  - s/p Azithromycin.  - Daily peak flow.   - Appreciate Pulmonary input.    #T2DM, poorly controlled   - FS q4h  - Current insulin regimen: Glargine 36u AM/ 44u PM, lispro 10/10/10 ordered,   - Fingersticks may rise due to Prednisone.  - Cardiology as an outpatient.    #HSV (+)   - discontinued valtrex 1 gm daily due to transaminitis  - reports last genital outbreak was March    #Obesity - BMI 41  - c/w ASA 81 mg    #AST/ALT elevated  - AST/ALT 50/193  - Hepatitis panel negative.  - repeat in AM  - f/u coagulation profile.  - s/p RUQ ultrasound    #Rh NEG  - Received Rhogam 24 due to bleeding  - Follow up [facility] cell free fetal DNA screening for Rh. Awaiting results

## 2024-06-04 NOTE — DISCHARGE NOTE ANTEPARTUM - MEDICATION SUMMARY - MEDICATIONS TO TAKE
I will START or STAY ON the medications listed below when I get home from the hospital:    predniSONE 20 mg oral tablet  -- 3 tab(s) by mouth every 24 hours taper steroids  -- Indication: For asthma    aspirin 81 mg oral tablet, chewable  -- 1 tab(s) by mouth once a day  -- Indication: For preE    insulin detemir 100 units/mL subcutaneous solution  -- 40 unit(s) subcutaneous once a day before breakfast  -- Indication: For diabetes    insulin lispro 100 units/mL injectable solution  -- 10 unit(s) injectable 3 times a day with meals  -- Indication: For diabetes    insulin detemir 100 units/mL subcutaneous solution  -- 48 unit(s) subcutaneous once (at bedtime)  -- Indication: For diabetes    loratadine 10 mg oral tablet  -- 1 tab(s) by mouth once a day  -- Indication: For asthma    ipratropium-albuterol 0.5 mg-2.5 mg/3 mL inhalation solution  -- 3 milliliter(s) inhaled every 6 hours  -- Indication: For asthma    budesonide-formoterol 160 mcg-4.5 mcg/inh inhalation aerosol  -- 2 puff(s) inhaled 2 times a day  -- Indication: For asthma    Prenatal Multivitamins with Folic Acid 1 mg oral tablet  -- 1 tab(s) by mouth once a day  -- Indication: For pregnancy    montelukast 10 mg oral tablet  -- 1 tab(s) by mouth once a day  -- Indication: For asthma    pantoprazole 40 mg oral delayed release tablet  -- 1 tab(s) by mouth once a day (before a meal)  -- Indication: For gerd

## 2024-06-04 NOTE — DISCHARGE NOTE ANTEPARTUM - CARE PROVIDERS DIRECT ADDRESSES
[email or web address]

## 2024-06-04 NOTE — DISCHARGE NOTE ANTEPARTUM - NS MD DC FALL RISK RISK
For information on Fall & Injury Prevention, visit: [email or web address] OR  [email or web address] OR  https://www.cdc.gov/steadi/patient.html

## 2024-06-04 NOTE — PROGRESS NOTE ADULT - ATTENDING COMMENTS
[facility] Staff    I saw and evaluated Ms. TAYLOR JAY  with Dr. Rivas.  Ms. TAYLOR JAY reports positive fetal movement and no vaginal bleeding.  She feels the same today as she did yesterday.  She did not receive her nebulizer treatment overnight.    Vital Signs Last 24 Hrs  T(C): 36.7 (2024 08:49), Max: 36.7 (2024 08:49)  T(F): 98.1 (2024 08:49), Max: 98.1 (2024 08:49)  HR: 85 (2024 08:49) (85 - 94)  BP: 115/69 (2024 08:49) (100/58 - 131/73)  RR: 18 (2024 08:49) (18 - 18)  SpO2: 97% (2024 08:49) (96% - 100%)      General:  Ms. TAYLOR JAY is lying in bed.  She appears comfortable.    Pulmonary:  Wheezing bilaterally.  Abdomen:  Soft, nontender, nondistended, no rebound, no guarding.  Extremities:  Nontender calves.    Labs as above.    Ms. Jay is a 32yo  @ 29w6d, with shortness of breath, likely asthma exacerbation low suspicion for PE/VTE, complicated by poorly controlled T2DM, LGA fetus, HSV with outbreak in pregnancy.  We discussed that over the past few days there has been minimal change in her status.    #Shortness of breath  - Less likely DVT due to preliminarily negative LE Dopplers  - Less likely influenza or COVID due to negative PCR  - Chest x ray was clear    Likely asthma exacerbation.  - Vitals per routine,   - Taper prednisone  - c/w Symbicort and singulair qd.    - albuterol prn  - Claritin 10mg qD  - PPI  - Duonebs q6h  - s/p Azithromycin.  - Daily peak flow.    - Appreciate Pulmonary input.     #T2DM, poorly controlled   - FS q4h.    - Current insulin regimen: Glargine 40u AM/ 48u PM, lispro 10/10/10.    - Cardiology as an outpatient.    #HSV (+)   - discontinued valtrex 1 gm daily due to transaminitis  - reports last genital outbreak was March    #Obesity - BMI 41  - c/w ASA 81 mg    #AST/ALT elevated  - AST/ALT 50/193-->36/153-->29/131-->28/119  - Coagulation profile is within normal limits.   - Hepatitis panel negative  - Tylenol should not be used for the time being.  - s/p RUQ ultrasound:  Cholelithiasis without sonographic evidence of cholecystitis.     Lesion within the right hepatic lobe measuring up to 1.2 cm, likely hemangioma.     We discussed that since her status has not changed much over the past few days she can be discharged home using a nebulizer treatment four times a day and on a Prednisone taper.  She should continue her symbicort, albuterol, and Singulair and continue monitoring and recording her peak flow daily and her fingersticks 4 times a day and take insulin as prescribed.  She should follow up with her primary Obstetrician next week and with M later this week.   She should follow up with Pulmonary as an outpatient.  If she feels significantly worse, has fever > 100 F, has difficulty breathing or her oxygen saturation is < 95% on room air (she should purchase a pulse oximeter) she should return to the hospital immediately.   If she has decreased fetal movement, contractions, or leakage of fluid she should also return to the hospital.   Ms. Jay expressed understanding and all of her questions were answered to her satisfaction.    Reza Sheppard MD

## 2024-06-04 NOTE — DISCHARGE NOTE ANTEPARTUM - PLAN OF CARE
Levemir 40units in the AM, 48units in the PM  LISPRO 10unit 3 times a day with meals (breakfast, lunch, and dinner)  Continue to check FS 4 times a day, Fasting, and 2hours after meals. Log your FS to review in clinic Continue all medications started in the hospital.   Taper prednisone pills - instructions given with medication

## 2024-06-05 ENCOUNTER — NON-APPOINTMENT (OUTPATIENT)
Age: 34
End: 2024-06-05

## 2024-06-05 ENCOUNTER — TRANSCRIPTION ENCOUNTER (OUTPATIENT)
Age: 34
End: 2024-06-05

## 2024-06-05 VITALS
OXYGEN SATURATION: 98 % | TEMPERATURE: 98 F | HEART RATE: 93 BPM | RESPIRATION RATE: 18 BRPM | DIASTOLIC BLOOD PRESSURE: 66 MMHG | SYSTOLIC BLOOD PRESSURE: 109 MMHG

## 2024-06-05 LAB
ALBUMIN SERPL ELPH-MCNC: 3.9 G/DL — SIGNIFICANT CHANGE UP (ref 3.5–5.2)
ALP SERPL-CCNC: 102 U/L — SIGNIFICANT CHANGE UP (ref 30–115)
ALT FLD-CCNC: 134 U/L — HIGH (ref 0–41)
ANION GAP SERPL CALC-SCNC: 8 MMOL/L — SIGNIFICANT CHANGE UP (ref 7–14)
AST SERPL-CCNC: 50 U/L — HIGH (ref 0–41)
BASOPHILS # BLD AUTO: 0.01 K/UL — SIGNIFICANT CHANGE UP (ref 0–0.2)
BASOPHILS NFR BLD AUTO: 0.1 % — SIGNIFICANT CHANGE UP (ref 0–1)
BILIRUB SERPL-MCNC: <0.2 MG/DL — SIGNIFICANT CHANGE UP (ref 0.2–1.2)
BUN SERPL-MCNC: 10 MG/DL — SIGNIFICANT CHANGE UP (ref 10–20)
CALCIUM SERPL-MCNC: 9.5 MG/DL — SIGNIFICANT CHANGE UP (ref 8.4–10.5)
CHLORIDE SERPL-SCNC: 98 MMOL/L — SIGNIFICANT CHANGE UP (ref 98–110)
CO2 SERPL-SCNC: 23 MMOL/L — SIGNIFICANT CHANGE UP (ref 17–32)
CREAT SERPL-MCNC: 0.5 MG/DL — LOW (ref 0.7–1.5)
EGFR: 125 ML/MIN/1.73M2 — SIGNIFICANT CHANGE UP
EOSINOPHIL # BLD AUTO: 0 K/UL — SIGNIFICANT CHANGE UP (ref 0–0.7)
EOSINOPHIL NFR BLD AUTO: 0 % — SIGNIFICANT CHANGE UP (ref 0–8)
GLUCOSE BLDC GLUCOMTR-MCNC: 132 MG/DL — HIGH (ref 70–99)
GLUCOSE BLDC GLUCOMTR-MCNC: 152 MG/DL — HIGH (ref 70–99)
GLUCOSE BLDC GLUCOMTR-MCNC: 172 MG/DL — HIGH (ref 70–99)
GLUCOSE SERPL-MCNC: 181 MG/DL — HIGH (ref 70–99)
HCT VFR BLD CALC: 40.9 % — SIGNIFICANT CHANGE UP (ref 37–47)
HGB BLD-MCNC: 13.6 G/DL — SIGNIFICANT CHANGE UP (ref 12–16)
IMM GRANULOCYTES NFR BLD AUTO: 1.1 % — HIGH (ref 0.1–0.3)
LYMPHOCYTES # BLD AUTO: 0.98 K/UL — LOW (ref 1.2–3.4)
LYMPHOCYTES # BLD AUTO: 14 % — LOW (ref 20.5–51.1)
MCHC RBC-ENTMCNC: 26.8 PG — LOW (ref 27–31)
MCHC RBC-ENTMCNC: 33.3 G/DL — SIGNIFICANT CHANGE UP (ref 32–37)
MCV RBC AUTO: 80.7 FL — LOW (ref 81–99)
MONOCYTES # BLD AUTO: 0.2 K/UL — SIGNIFICANT CHANGE UP (ref 0.1–0.6)
MONOCYTES NFR BLD AUTO: 2.9 % — SIGNIFICANT CHANGE UP (ref 1.7–9.3)
NEUTROPHILS # BLD AUTO: 5.73 K/UL — SIGNIFICANT CHANGE UP (ref 1.4–6.5)
NEUTROPHILS NFR BLD AUTO: 81.9 % — HIGH (ref 42.2–75.2)
NRBC # BLD: 0 /100 WBCS — SIGNIFICANT CHANGE UP (ref 0–0)
PLATELET # BLD AUTO: 282 K/UL — SIGNIFICANT CHANGE UP (ref 130–400)
PMV BLD: 9.5 FL — SIGNIFICANT CHANGE UP (ref 7.4–10.4)
POTASSIUM SERPL-MCNC: 3.9 MMOL/L — SIGNIFICANT CHANGE UP (ref 3.5–5)
POTASSIUM SERPL-SCNC: 3.9 MMOL/L — SIGNIFICANT CHANGE UP (ref 3.5–5)
PROT SERPL-MCNC: 6.8 G/DL — SIGNIFICANT CHANGE UP (ref 6–8)
RAPID RVP RESULT: SIGNIFICANT CHANGE UP
RBC # BLD: 5.07 M/UL — SIGNIFICANT CHANGE UP (ref 4.2–5.4)
RBC # FLD: 14 % — SIGNIFICANT CHANGE UP (ref 11.5–14.5)
SARS-COV-2 RNA SPEC QL NAA+PROBE: SIGNIFICANT CHANGE UP
SODIUM SERPL-SCNC: 129 MMOL/L — LOW (ref 135–146)
WBC # BLD: 7 K/UL — SIGNIFICANT CHANGE UP (ref 4.8–10.8)
WBC # FLD AUTO: 7 K/UL — SIGNIFICANT CHANGE UP (ref 4.8–10.8)

## 2024-06-05 PROCEDURE — 71045 X-RAY EXAM CHEST 1 VIEW: CPT | Mod: 26

## 2024-06-05 PROCEDURE — 99239 HOSP IP/OBS DSCHRG MGMT >30: CPT

## 2024-06-05 RX ORDER — GLUCAGON HYDROCHLORIDE 1 MG
1 KIT INJECTION
Qty: 1 | Refills: 0 | Status: ACTIVE | COMMUNITY
Start: 2024-06-05 | End: 1900-01-01

## 2024-06-05 RX ADMIN — Medication 3 MILLILITER(S): at 13:08

## 2024-06-05 RX ADMIN — Medication 81 MILLIGRAM(S): at 11:32

## 2024-06-05 RX ADMIN — Medication 3 MILLILITER(S): at 08:33

## 2024-06-05 RX ADMIN — Medication 10 UNIT(S): at 11:31

## 2024-06-05 RX ADMIN — Medication 60 MILLIGRAM(S): at 01:06

## 2024-06-05 RX ADMIN — Medication 10 UNIT(S): at 08:25

## 2024-06-05 RX ADMIN — MONTELUKAST 10 MILLIGRAM(S): 4 TABLET, CHEWABLE ORAL at 11:32

## 2024-06-05 RX ADMIN — LORATADINE 10 MILLIGRAM(S): 10 TABLET ORAL at 11:32

## 2024-06-05 RX ADMIN — INSULIN GLARGINE 40 UNIT(S): 100 INJECTION, SOLUTION SUBCUTANEOUS at 09:09

## 2024-06-05 RX ADMIN — PANTOPRAZOLE SODIUM 40 MILLIGRAM(S): 20 TABLET, DELAYED RELEASE ORAL at 06:22

## 2024-06-05 RX ADMIN — Medication 10 UNIT(S): at 17:17

## 2024-06-05 RX ADMIN — Medication 1 TABLET(S): at 11:32

## 2024-06-05 NOTE — PROGRESS NOTE ADULT - SUBJECTIVE AND OBJECTIVE BOX
PGY 2 Note  6/5/24  GA: 30w0d    Pt seen and examined at bedside. Still complaining of wheezing, states its better than yesterday. She states she had chils and sweats last night and this morning. Denies ctx, lof or vaginal bleeding. Pt denies headache, dizziness, weakness, abdominal pain, palpitations, or diaphoresis. Not eating diabetic diet.    Ambulating: Yes  Voiding: Yes  Flatus: Yes  Bowel movements: Yes   Diet: Regular    MEDICATIONS  (STANDING):  albuterol/ipratropium for Nebulization 3 milliLiter(s) Nebulizer every 6 hours  aspirin  chewable 81 milliGRAM(s) Oral daily  budesonide 160 MICROgram(s)/formoterol 4.5 MICROgram(s) Inhaler 2 Puff(s) Inhalation two times a day  chlorhexidine 2% Cloths 1 Application(s) Topical daily  dextrose 10% Bolus 125 milliLiter(s) IV Bolus once  dextrose 5%. 1000 milliLiter(s) (100 mL/Hr) IV Continuous <Continuous>  dextrose 5%. 1000 milliLiter(s) (50 mL/Hr) IV Continuous <Continuous>  dextrose 50% Injectable 25 Gram(s) IV Push once  dextrose 50% Injectable 12.5 Gram(s) IV Push once  glucagon  Injectable 1 milliGRAM(s) IntraMuscular once  insulin glargine Injectable (LANTUS) 40 Unit(s) SubCutaneous every morning  insulin glargine Injectable (LANTUS) 48 Unit(s) SubCutaneous at bedtime  insulin lispro Injectable (ADMELOG) 10 Unit(s) SubCutaneous before dinner  insulin lispro Injectable (ADMELOG) 10 Unit(s) SubCutaneous before breakfast  insulin lispro Injectable (ADMELOG) 10 Unit(s) SubCutaneous before lunch  loratadine 10 milliGRAM(s) Oral daily  montelukast 10 milliGRAM(s) Oral daily  pantoprazole    Tablet 40 milliGRAM(s) Oral before breakfast  predniSONE   Tablet 60 milliGRAM(s) Oral every 24 hours  prenatal multivitamin 1 Tablet(s) Oral daily    PAST MEDICAL & SURGICAL HISTORY:  Asthma  Diabetes mellitus, type 2  Eczema  PCOS (polycystic ovarian syndrome  Rh negative status during pregnancy  Rosacea    No significant past surgical history    Physical Exam:   Vital Signs Last 24 Hrs  T(C): 36.8 (05 Jun 2024 00:46), Max: 37.7 (04 Jun 2024 16:14)  T(F): 98.3 (05 Jun 2024 00:46), Max: 99.8 (04 Jun 2024 16:14)  HR: 106 (05 Jun 2024 00:46) (85 - 118)  BP: 125/73 (05 Jun 2024 00:46) (106/64 - 125/73)  BP(mean): --  RR: 18 (05 Jun 2024 00:46) (18 - 18)  SpO2: 99% (05 Jun 2024 00:46) (97% - 99%)    Parameters below as of 05 Jun 2024 00:46  Patient On (Oxygen Delivery Method): room air    Gen: AOx3, NAD   Cardio: RRR, S1S2, no m/r/g  Pulm: Wheezing bilaterally  Abdomen: soft, gravid, nontender, no palpable contractions   Extremities: no swelling or erythema noted       BSS: BPP 8/8 MVP 5.2cm, vtx    Labs:                         13.6   14.61 )-----------( 295      ( 04 Jun 2024 06:43 )             41.9                         13.0   12.24 )-----------( 309      ( 03 Jun 2024 06:23 )             40.1                         13.0   11.30 )-----------( 272      ( 02 Jun 2024 06:28 )             41.1       06-04    135  |  102  |  14  ----------------------------<  156  4.5   |  20  |  0.5  06-03    134  |  102  |  14  ----------------------------<  161  4.6   |  21  |  0.5    Ca    9.6      04 Jun 2024 06:43   Mg    x   04 Jun 2024 06:43  Phos    x    04 Jun 2024 06:43  Ca    9.2      03 Jun 2024 06:23   Mg    x   03 Jun 2024 06:23  Phos    x    03 Jun 2024 06:23    TPro  6.7  /  Alb  3.7  /  TBili  0.4  /  DBili  x   /  AST  28  /  ALT  119  /  AlkPhos  97  06-04-24      Lactate dehydrogenase  x     06-04-24  Uric acid    x    06-04-24  TPro  6.3  /  Alb  3.7  /  TBili  <0.2  /  DBili  x   /  AST  29  /  ALT  131  /  AlkPhos  93  06-03-24      Lactate dehydrogenase  x     06-03-24  Uric acid    x    06-03-24    CAPILLARY BLOOD GLUCOSE      POCT Blood Glucose.: 147 mg/dL (04 Jun 2024 07:23)  POCT Blood Glucose.: 130 mg/dL (03 Jun 2024 22:38)         6/5/24  MFM Att'g and PGY 2 f/u Consult Note  6/5/24  HD#9    GA: 30w0d  Pt seen and examined at bedside. Feeling much better, peak flows almost 400.  No wheezing except on forced expriation.  Last night she reports chills and a fever of 101o on her own thermometer.  Temp by RN was normal.   Denies ctx, lof or vaginal bleeding. This morning she denies headache, dizziness, weakness, abdominal pain, palpitations, or diaphoresis. Not eating diabetic diet.  Ambulating: Yes  Voiding: Yes  Flatus: Yes  Bowel movements: Yes   Diet: Regular    MEDICATIONS  (STANDING):  albuterol/ipratropium for Nebulization 3 milliLiter(s) Nebulizer every 6 hours  aspirin  chewable 81 milliGRAM(s) Oral daily  budesonide 160 MICROgram(s)/formoterol 4.5 MICROgram(s) Inhaler 2 Puff(s) Inhalation two times a day  chlorhexidine 2% Cloths 1 Application(s) Topical daily  dextrose 10% Bolus 125 milliLiter(s) IV Bolus once  dextrose 5%. 1000 milliLiter(s) (100 mL/Hr) IV Continuous <Continuous>  dextrose 5%. 1000 milliLiter(s) (50 mL/Hr) IV Continuous <Continuous>  dextrose 50% Injectable 25 Gram(s) IV Push once  dextrose 50% Injectable 12.5 Gram(s) IV Push once  glucagon  Injectable 1 milliGRAM(s) IntraMuscular once  insulin glargine Injectable (LANTUS) 40 Unit(s) SubCutaneous every morning  insulin glargine Injectable (LANTUS) 48 Unit(s) SubCutaneous at bedtime  insulin lispro Injectable (ADMELOG) 10 Unit(s) SubCutaneous before dinner  insulin lispro Injectable (ADMELOG) 10 Unit(s) SubCutaneous before breakfast  insulin lispro Injectable (ADMELOG) 10 Unit(s) SubCutaneous before lunch  loratadine 10 milliGRAM(s) Oral daily  montelukast 10 milliGRAM(s) Oral daily  pantoprazole    Tablet 40 milliGRAM(s) Oral before breakfast  predniSONE   Tablet 60 milliGRAM(s) Oral every 24 hours  prenatal multivitamin 1 Tablet(s) Oral daily    PAST MEDICAL & SURGICAL HISTORY:  Asthma  Diabetes mellitus, type 2  Eczema  PCOS (polycystic ovarian syndrome  Rh negative status during pregnancy  Rosacea    No significant past surgical history    Physical Exam:   Vital Signs Last 24 Hrs  T(C): 36.8, Max: 37.7 (04 Jun 2024 16:14)  HR: (85 - 118)  BP: (106/64 - 125/73)   RR: 18  SpO2: (97% - 99%) Rm air    Gen: AOx3, NAD   Cardio: RRR, S1S2, no m/r/g  Pulm: Clear to ausc throughout R & L lung fields.   Abd:  Fundus soft, gravid, nontender, no palpable contractions   Extremities: no swelling or erythema noted       BSS: BPP 8/8 MVP 5.2cm, vtx    Labs:  Oneg/AntibNeg                         13.6   14.61 )-----------( 295      ( 04 Jun 2024 06:43 )             41.9                         13.0   12.24 )-----------( 309      ( 03 Jun 2024 06:23 )             40.1                         13.0   11.30 )-----------( 272      ( 02 Jun 2024 06:28 )             41.1     135  |  102  |  14  ----------------------------<  156  4.5   |  20  |  0.5  06-03  134  |  102  |  14  ----------------------------<  161  4.6   |  21  |  0.5    Ca    9.6      04 Jun 2024 06:43   Mg    x   04 Jun 2024 06:43  Phos    x    04 Jun 2024 06:43  Ca    9.2      03 Jun 2024 06:23   Mg    x   03 Jun 2024 06:23  Phos    x    03 Jun 2024 06:23    TPro  6.7  /  Alb  3.7  /  TBili  0.4  /  DBili  x   /  AST  28  /  ALT  119  /  AlkPhos  97  06-04-24    TPro  6.3  /  Alb  3.7  /  TBili  <0.2  /  DBili  x   /  AST  29  /  ALT  131  /  AlkPhos  93  06-03-24    CAPILLARY BLOOD GLUCOSE  POCT Blood Glucose.: 147 mg/dL (04 Jun 2024 07:23)  POCT Blood Glucose.: 130 mg/dL (03 Jun 2024 22:38)

## 2024-06-05 NOTE — PROGRESS NOTE ADULT - ATTENDING COMMENTS
Asthma exacerbation at 30w, with slow but complete to Rx. Home today.  f/u in 5d [facility]. b

## 2024-06-05 NOTE — PROGRESS NOTE ADULT - ASSESSMENT
32yo  @30w0d, with shortness of breath, likely asthma exacerbation low suspicion for PE/VTE, complicated by poorly controlled T2DM, LGA fetus, HSV with outbreak in pregnancy.    - anticipate d/c today    #Shortness of breath  - Less likely DVT due to preliminarily negative LE Dopplers  - Less likely influenza or COVID due to negative PCR, f/u repeat RVP panel  - Chest x ray was clear, f/u repeat    Likely asthma exacerbation.  - Vitals per routine,   - Prednisone 60 mg q24h for the time being, plan for taper on discharge  - c/w Symbicort and singulair qd   - albuterol prn  - Claritin 10mg qD  - PPI  - Duonebs q6h  - s/p Azithromycin.  - Daily peak flow.   - Appreciate Pulmonary input.    #T2DM, poorly controlled   - FS q4h  - Current insulin regimen: Glargine 36u AM/ 44u PM, lispro 10/10/10 ordered,   - Fingersticks may rise due to Prednisone.  - Cardiology as an outpatient.    #HSV (+)   - discontinued valtrex 1 gm daily due to transaminitis  - reports last genital outbreak was March    #Obesity - BMI 41  - c/w ASA 81 mg    #AST/ALT elevated  - AST/ALT 50/193  - Hepatitis panel negative.  - repeat in AM  - f/u coagulation profile.  - s/p RUQ ultrasound    #Rh NEG  - Received Rhogam 24 due to bleeding  - Follow up [facility] cell free fetal DNA screening for Rh. Awaiting results 34yo  @30w0d, with asthma exacerbation that has slowly, completely resolved on PO steroids.  Her course is also c/bsuboptimally controlled T2DM, LGA fetus, HSV with outbreak in pregnancy.    1. Asthma exacerbation:  Unlikely influenza or COVID due to negative PCR, f/u repeat RVP panel  - 1st chest x ray was clear, f/u repeat  -->Home on Prednisone 60 mg q24h for slow taper over 2w.   - c/w Symbicort and singulair qd and Claritin 10mg qD   - albuterol prn  - PPI  - Duonebs q6h.  Pt may decrease to tid, bid, daily then only prn as tolerated.   - s/p Azithromycin.  - Continue Daily peak flow.   - Appreciate Pulmonary input.    2.  Subopimally controlled IDDM2 on split dose insulin.  - Current insulin regimen: Glargine 36u AM/ 44u PM, lispro 10/10/10 ordered,   - Pt will resume Detemir 1:1 Glargine at home.   - Fingersticks exacerbated on steroids.  Tight control after taper completed.   - Cardiology as an outpatient.    3. HSV (+):  discontinued valtrex 1 gm daily due to transaminitis which has resolved.   - reports last genital outbreak was . BMI 41  - c/w ASA 81 mg. D/C at 36w.   5. O neg.  Rhogam is overdue.  Will administer in clinic next week.     MD Hiram, FACOG with A DO Evelyn, PGY-3    #AST/ALT elevated  - AST/ALT 50/193  - Hepatitis panel negative.  - repeat in AM  - f/u coagulation profile.  - s/p RUQ ultrasound    #Rh NEG  - Received Rhogam 24 due to bleeding  - Follow up [facility] cell free fetal DNA screening for Rh. Awaiting results

## 2024-06-05 NOTE — DISCHARGE NOTE NURSING/CASE MANAGEMENT/SOCIAL WORK - PATIENT PORTAL LINK FT
You can access the FollowMyHealth Patient Portal offered by [facility] by registering at the following website: [email or web address]. By joining [employer]’s FollowMyHealth portal, you will also be able to view your health information using other applications (apps) compatible with our system.

## 2024-06-05 NOTE — PROGRESS NOTE ADULT - TIME BILLING
pt interview, examination and counseling in addition to coordination of interdisciplinary plan of care. kpb
interview, examination and ongoing counselling of pt along with formulation and coordination of plan of care. kpb
interview, examination and counseling of patient followed by coordination of discharge planning and follow up.  kpb

## 2024-06-10 ENCOUNTER — NON-APPOINTMENT (OUTPATIENT)
Age: 34
End: 2024-06-10

## 2024-06-10 ENCOUNTER — APPOINTMENT (OUTPATIENT)
Dept: ANTEPARTUM | Facility: CLINIC | Age: 34
End: 2024-06-10
Payer: MEDICAID

## 2024-06-10 ENCOUNTER — ASOB RESULT (OUTPATIENT)
Age: 34
End: 2024-06-10

## 2024-06-10 ENCOUNTER — APPOINTMENT (OUTPATIENT)
Dept: OBGYN | Facility: CLINIC | Age: 34
End: 2024-06-10
Payer: MEDICAID

## 2024-06-10 ENCOUNTER — OUTPATIENT (OUTPATIENT)
Dept: OUTPATIENT SERVICES | Facility: HOSPITAL | Age: 34
LOS: 1 days | End: 2024-06-10
Payer: MEDICAID

## 2024-06-10 VITALS
BODY MASS INDEX: 41.83 KG/M2 | HEIGHT: 64 IN | SYSTOLIC BLOOD PRESSURE: 117 MMHG | DIASTOLIC BLOOD PRESSURE: 76 MMHG | WEIGHT: 245 LBS

## 2024-06-10 DIAGNOSIS — A60.00 HERPESVIRAL INFECTION OF UROGENITAL SYSTEM, UNSPECIFIED: ICD-10-CM

## 2024-06-10 DIAGNOSIS — Z34.90 ENCOUNTER FOR SUPERVISION OF NORMAL PREGNANCY, UNSPECIFIED, UNSPECIFIED TRIMESTER: ICD-10-CM

## 2024-06-10 DIAGNOSIS — O09.90 SUPERVISION OF HIGH RISK PREGNANCY, UNSPECIFIED, UNSPECIFIED TRIMESTER: ICD-10-CM

## 2024-06-10 DIAGNOSIS — O24.919 UNSPECIFIED DIABETES MELLITUS IN PREGNANCY, UNSPECIFIED TRIMESTER: ICD-10-CM

## 2024-06-10 LAB
BILIRUB UR QL STRIP: NORMAL
BP DIAS: 76 MM HG
BP SYS: 117 MM HG
CLARITY UR: CLEAR
COLLECTION METHOD: NORMAL
FETAL MOVEMENT: PRESENT
GLUCOSE BLDC GLUCOMTR-MCNC: 219
GLUCOSE BLDC GLUCOMTR-MCNC: 219 MG/DL — HIGH (ref 70–99)
GLUCOSE UR-MCNC: 250
HCG UR QL: 0.2 EU/DL
HGB UR QL STRIP.AUTO: NORMAL
KETONES UR-MCNC: NORMAL
LEUKOCYTE ESTERASE UR QL STRIP: NORMAL
NITRITE UR QL STRIP: NORMAL
OB COMMENTS: NORMAL
PH UR STRIP: 5
PROT UR STRIP-MCNC: NORMAL
SCHEDULED VISIT: YES
SP GR UR STRIP: 1.03
URINE ALBUMIN/PROTEIN: NORMAL
URINE GLUCOSE: 250
URINE KETONES: NORMAL
WEEKS GESTATION: 30.5

## 2024-06-10 PROCEDURE — 82948 REAGENT STRIP/BLOOD GLUCOSE: CPT

## 2024-06-10 PROCEDURE — 99215 OFFICE O/P EST HI 40 MIN: CPT | Mod: TH,25

## 2024-06-10 PROCEDURE — 99215 OFFICE O/P EST HI 40 MIN: CPT | Mod: 25

## 2024-06-10 PROCEDURE — 90384 RH IG FULL-DOSE IM: CPT

## 2024-06-10 PROCEDURE — 76816 OB US FOLLOW-UP PER FETUS: CPT | Mod: 26

## 2024-06-10 PROCEDURE — 36415 COLL VENOUS BLD VENIPUNCTURE: CPT

## 2024-06-10 PROCEDURE — 76819 FETAL BIOPHYS PROFIL W/O NST: CPT | Mod: 26,59

## 2024-06-10 PROCEDURE — 99214 OFFICE O/P EST MOD 30 MIN: CPT | Mod: TH

## 2024-06-10 PROCEDURE — 99214 OFFICE O/P EST MOD 30 MIN: CPT

## 2024-06-10 PROCEDURE — G2211 COMPLEX E/M VISIT ADD ON: CPT | Mod: NC

## 2024-06-10 PROCEDURE — 81002 URINALYSIS NONAUTO W/O SCOPE: CPT

## 2024-06-10 PROCEDURE — 76819 FETAL BIOPHYS PROFIL W/O NST: CPT

## 2024-06-10 PROCEDURE — 76816 OB US FOLLOW-UP PER FETUS: CPT

## 2024-06-10 PROCEDURE — 80053 COMPREHEN METABOLIC PANEL: CPT

## 2024-06-10 PROCEDURE — 82962 GLUCOSE BLOOD TEST: CPT

## 2024-06-10 RX ORDER — HUMAN RHO(D) IMMUNE GLOBULIN 300 UG/1
1500 INJECTION, SOLUTION INTRAMUSCULAR
Refills: 0 | Status: COMPLETED | OUTPATIENT
Start: 2024-06-10

## 2024-06-10 RX ORDER — VALACYCLOVIR 1 G/1
1 TABLET, FILM COATED ORAL
Qty: 7 | Refills: 11 | Status: ACTIVE | COMMUNITY
Start: 2024-06-10 | End: 1900-01-01

## 2024-06-10 RX ORDER — ACYCLOVIR 50 MG/G
5 CREAM TOPICAL
Qty: 1 | Refills: 3 | Status: ACTIVE | COMMUNITY
Start: 2024-06-10 | End: 1900-01-01

## 2024-06-10 NOTE — HISTORY OF PRESENT ILLNESS
[FreeTextEntry1] : 6/10/24 MFM Att'g & PGY-3 f/u consult note: Ms Negro is referred by Dr Richey. 33y  at 30w5d (FORREST  by [facility]), with pregnancy c/b consultation for T2DM. This pregnancy conceived with ovulation induction on clomid. Patient is following up post 9 day hospital admission for acute asthma exacerbation, d/c'ed on . She was discharged on an increased insulin regimen: Levemir 40/48, Humalog 10/10/10. She was prescribed a Prednisone taper starting at 60mg qday, the plan is to decrease by 10mg every 2 days and hold at 20mg daily. We will increase her evening Levemir from 48->52units until Saturday when she is on 30mg of prednisone. She states that her asthma has improved since leaving the hospital. She did not bring a FS log with her, states that her FS are high, fastings in the 112-120 range. Continues to eat a regular diet, no adjustment for diabetic considerations.  During her inpatient admission noted to have elevated LFTs, Valtrex was d/c'ed and LFTs were trending down, plan is to not take Valtrex for prophylaxis.  Taking ASA daily. Denies contractions, leakage of fluid, vaginal bleeding. Endorses good fetal movement.  PMHx: DM2: last A1c 6.6% 3/1. Previously on Ozempic and Syngarty, switched to Levermir BID , Lispro tid. Follows with endocrinologist Dr. Robles. Asthma. Hospitalization end of 2024 Denies intubations. Currently on albuterol PRN, symbicort daily, and singular daily.  PCOS Meds: albuterol PRN, symbicort daily, and singular daily. Levemir 40AM/48PM, lispro 10/10/10 Metformin: 500mg daily at different times, forgets often. D/C'd . Allergies: seasonal OB Hx: , Missed AB with D&C, ectopic s/p MTX in  Gyn Hx: H/o HSV. Last outbreak in March. denies abnormal pap smears, fibroids, cysts, stds FH: denies pertinent hx, she was adopted. 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 Denies smoking, alcohol or drug use. Patient discussed barriers to care including difficulty with her insurance and lack of social support. FOB is  to another women and continues that relationship. She states he promised her when the baby is born he will leave that relationship to be with her. Patient is adopted. Patients adopted mother passed aware 2-3 months ago from liver cancer and adoptive father  from diabetes when she was 13. Her biological mother struggles with drug addiction. She states she has 1 friend, Marilee who has offered to help her. Iram's remaining family does not know about the pregnancy. She is reluctant to discuss it with them as she knows the FOB is  to another women. She knows she will eventually need to discuss the pregnancy with them. She currently lives alone with her dog and cat. No psychiatric history. Vaccinations: Vaccinated for CoVID x2 and Influenza last . Family Planning: per Dr Richey. ROS: as above.  Px: Attentive and cooperative woman, moves easily about room. VS: /76 HR: 93 bpm, O2sat: 99 %, Wt: 245<249<247<242<239<238lbs<239lbs<236<235 lbs   (forgot insulin) U dip: 250 glucose HEENT: Acne rosacea on her face. 1cm hordeolum on left eye. Heart: RRR, no M/R/G Lungs: CTAB Abd: soft, nontender, nondistended LE: no erythema, edema or pain. Atopic dermatitis on upper extremities.  LAB: Oneg/AntibNeg. HbEP AA 23 A1c 9.1, Hep B NR, Hep C NR, Rubella: equivocal, Frag X: neg, SMA: neg, TB Quant: Neg,CF neg, NIPTs LR 24: Panorama risk reducing. 3/1/24 A1C 6.6%;  mg/24 hours 3/28 UCx nl. MSAFP risk reducing. 3/2 TSH 1.87, T4 9, 24hr   Outpatient LFT trend AST/ALT  70/210, 67/170  56/190  50/193 62 36/153 6/3 29/131 6/ 28/119 6/5 50/134 6/10 pending.   OBUS: : S=D based on  sono : Normal NT screen 3/1: 16w2d, 186g anatomy wnl : 20w5d, LGA 98% (AC 94%) anatomy wnl but incomplete eval of heart  fetal echo wnl 5/3 25w2d 2lb7oz >99% MVP 7.85cm  Impression/Recommendations: Ms Negro is a 34yo  at 30w5d GA FORREST 8/15/2024 with asthma and T2DM, with persistent hordeulum on her left eye for the past month. 1. T2DM: Worsening after inpatient steroid course, isulin demand increased significantly. Current Regimen: Levemir 40AM/48PM, lispro 10/10/10 New Regimen :  Levemir 40AM/52PM, lispro 10/10/10 (decrease evening levemir back to 48 on Saturday after tapering to 30mg prednisone daily) -Previously discussed the risk of end organ damage, including eyes, heart, kidneys in patients with diabetes, as well as the risk of coma and death in patients with uncontrolled diabetes. Counseled on risks to fetus of poorly controlled diabetes in pregnancy. -C/w ASA 81 mg PO daily -S/p dietician counseling -3/1 24hr Previously recommended: - Dilated eye exam and foot exam. Opthamology s/p appointment with f/u scheduled. Referral for podiatry given previously given. - 3/1 EKG: NSR - Referral previously for cardiology given, encouraged to make appointment - Fetal echo - wnl  2. Obesity -Previously discussed that obesity is associated with a host of pregnancy complications. -The patient is at increased risk for preeclampsia and gestational hypertension,  delivery, macrosomia, gestational diabetes, deep venous thrombosis,  delivery, stillbirth and certain birth defects, such as open neural tube defects. - Will need weekly biophysical profiles in third trimester. - Cardiology consult pending 3. Asthma:  -Recent prolonged inpatient admission for acute asthma exacerbation -Controlled with prednisone, nebulizer treatments q6h, symbicort daily, singulair daily, albuterol prn -Continue with current regimen, follows closely with Dr. Nash off of [facility]. -->If nighttime wheezing, low threshold for PPI. 4. Ocular hordeulum, left - She had a ophthalmology consult during inpatient admission - warm compresses 5. Rosacea -Recommended gentle face cleanser, azelaic acid, and sunscreen -Dermatology referral given 6. RhNeg. First trimester vaginal bleeding, now resolved s/p Rhogam  - Rhogam given today 6/10/24 7. Pregnancy -History of HSV, Continue Valtrex 1g daily for suppression. Discontinued during inpatient stay 2024, suspected cause for elevated LFTs, will try acyclovir cream -Follow with Dr Richey for prenatal care  Follow up in 1 week for BG check. MD Hiram, FACOG with Erick GRANT, PGY-2

## 2024-06-11 DIAGNOSIS — O98.513 OTHER VIRAL DISEASES COMPLICATING PREGNANCY, THIRD TRIMESTER: ICD-10-CM

## 2024-06-11 DIAGNOSIS — Z3A.30 30 WEEKS GESTATION OF PREGNANCY: ICD-10-CM

## 2024-06-11 DIAGNOSIS — O99.519 DISEASES OF THE RESPIRATORY SYSTEM COMPLICATING PREGNANCY, UNSPECIFIED TRIMESTER: ICD-10-CM

## 2024-06-11 DIAGNOSIS — A60.00 HERPESVIRAL INFECTION OF UROGENITAL SYSTEM, UNSPECIFIED: ICD-10-CM

## 2024-06-11 DIAGNOSIS — Z3A.32 32 WEEKS GESTATION OF PREGNANCY: ICD-10-CM

## 2024-06-11 DIAGNOSIS — O99.213 OBESITY COMPLICATING PREGNANCY, THIRD TRIMESTER: ICD-10-CM

## 2024-06-11 DIAGNOSIS — O09.519 SUPERVISION OF ELDERLY PRIMIGRAVIDA, UNSPECIFIED TRIMESTER: ICD-10-CM

## 2024-06-11 DIAGNOSIS — O36.60X0 MATERNAL CARE FOR EXCESSIVE FETAL GROWTH, UNSPECIFIED TRIMESTER, NOT APPLICABLE OR UNSPECIFIED: ICD-10-CM

## 2024-06-11 DIAGNOSIS — O24.919 UNSPECIFIED DIABETES MELLITUS IN PREGNANCY, UNSPECIFIED TRIMESTER: ICD-10-CM

## 2024-06-11 DIAGNOSIS — O24.113 PRE-EXISTING TYPE 2 DIABETES MELLITUS, IN PREGNANCY, THIRD TRIMESTER: ICD-10-CM

## 2024-06-13 DIAGNOSIS — O99.513 DISEASES OF THE RESPIRATORY SYSTEM COMPLICATING PREGNANCY, THIRD TRIMESTER: ICD-10-CM

## 2024-06-13 DIAGNOSIS — L30.9 DERMATITIS, UNSPECIFIED: ICD-10-CM

## 2024-06-13 DIAGNOSIS — J45.901 UNSPECIFIED ASTHMA WITH (ACUTE) EXACERBATION: ICD-10-CM

## 2024-06-13 DIAGNOSIS — O36.63X0 MATERNAL CARE FOR EXCESSIVE FETAL GROWTH, THIRD TRIMESTER, NOT APPLICABLE OR UNSPECIFIED: ICD-10-CM

## 2024-06-13 DIAGNOSIS — O26.893 OTHER SPECIFIED PREGNANCY RELATED CONDITIONS, THIRD TRIMESTER: ICD-10-CM

## 2024-06-13 DIAGNOSIS — Z63.8 OTHER SPECIFIED PROBLEMS RELATED TO PRIMARY SUPPORT GROUP: ICD-10-CM

## 2024-06-13 DIAGNOSIS — O99.891 OTHER SPECIFIED DISEASES AND CONDITIONS COMPLICATING PREGNANCY: ICD-10-CM

## 2024-06-13 DIAGNOSIS — O98.513 OTHER VIRAL DISEASES COMPLICATING PREGNANCY, THIRD TRIMESTER: ICD-10-CM

## 2024-06-13 DIAGNOSIS — L71.9 ROSACEA, UNSPECIFIED: ICD-10-CM

## 2024-06-13 DIAGNOSIS — Z28.09 IMMUNIZATION NOT CARRIED OUT BECAUSE OF OTHER CONTRAINDICATION: ICD-10-CM

## 2024-06-13 DIAGNOSIS — E28.2 POLYCYSTIC OVARIAN SYNDROME: ICD-10-CM

## 2024-06-13 DIAGNOSIS — O99.713 DISEASES OF THE SKIN AND SUBCUTANEOUS TISSUE COMPLICATING PREGNANCY, THIRD TRIMESTER: ICD-10-CM

## 2024-06-13 DIAGNOSIS — O99.283 ENDOCRINE, NUTRITIONAL AND METABOLIC DISEASES COMPLICATING PREGNANCY, THIRD TRIMESTER: ICD-10-CM

## 2024-06-13 DIAGNOSIS — O40.3XX0 POLYHYDRAMNIOS, THIRD TRIMESTER, NOT APPLICABLE OR UNSPECIFIED: ICD-10-CM

## 2024-06-13 DIAGNOSIS — B00.9 HERPESVIRAL INFECTION, UNSPECIFIED: ICD-10-CM

## 2024-06-13 DIAGNOSIS — O99.612 DISEASES OF THE DIGESTIVE SYSTEM COMPLICATING PREGNANCY, SECOND TRIMESTER: ICD-10-CM

## 2024-06-13 DIAGNOSIS — Z3A.28 28 WEEKS GESTATION OF PREGNANCY: ICD-10-CM

## 2024-06-13 DIAGNOSIS — D18.00 HEMANGIOMA UNSPECIFIED SITE: ICD-10-CM

## 2024-06-13 DIAGNOSIS — R74.01 ELEVATION OF LEVELS OF LIVER TRANSAMINASE LEVELS: ICD-10-CM

## 2024-06-13 DIAGNOSIS — Z11.52 ENCOUNTER FOR SCREENING FOR COVID-19: ICD-10-CM

## 2024-06-13 DIAGNOSIS — Z79.4 LONG TERM (CURRENT) USE OF INSULIN: ICD-10-CM

## 2024-06-13 DIAGNOSIS — Z79.82 LONG TERM (CURRENT) USE OF ASPIRIN: ICD-10-CM

## 2024-06-13 DIAGNOSIS — O99.213 OBESITY COMPLICATING PREGNANCY, THIRD TRIMESTER: ICD-10-CM

## 2024-06-13 DIAGNOSIS — Z91.199 PATIENT'S NONCOMPLIANCE WITH OTHER MEDICAL TREATMENT AND REGIMEN DUE TO UNSPECIFIED REASON: ICD-10-CM

## 2024-06-13 DIAGNOSIS — O24.913 UNSPECIFIED DIABETES MELLITUS IN PREGNANCY, THIRD TRIMESTER: ICD-10-CM

## 2024-06-13 DIAGNOSIS — K80.20 CALCULUS OF GALLBLADDER WITHOUT CHOLECYSTITIS WITHOUT OBSTRUCTION: ICD-10-CM

## 2024-06-13 DIAGNOSIS — O36.8130 DECREASED FETAL MOVEMENTS, THIRD TRIMESTER, NOT APPLICABLE OR UNSPECIFIED: ICD-10-CM

## 2024-06-13 SDOH — SOCIAL STABILITY - SOCIAL INSECURITY: OTHER SPECIFIED PROBLEMS RELATED TO PRIMARY SUPPORT GROUP: Z63.8

## 2024-06-17 ENCOUNTER — NON-APPOINTMENT (OUTPATIENT)
Age: 34
End: 2024-06-17

## 2024-06-17 LAB
ALBUMIN SERPL ELPH-MCNC: 3.6 G/DL
ALP BLD-CCNC: 101 U/L
ALT SERPL-CCNC: 113 U/L
ANION GAP SERPL CALC-SCNC: 16 MMOL/L
AST SERPL-CCNC: 39 U/L
BILIRUB SERPL-MCNC: 0.2 MG/DL
BUN SERPL-MCNC: 13 MG/DL
CALCIUM SERPL-MCNC: 9.1 MG/DL
CHLORIDE SERPL-SCNC: 102 MMOL/L
CO2 SERPL-SCNC: 18 MMOL/L
CREAT SERPL-MCNC: <0.5 MG/DL
EGFR: 132 ML/MIN/1.73M2
GLUCOSE SERPL-MCNC: 178 MG/DL
POTASSIUM SERPL-SCNC: 4.1 MMOL/L
PROT SERPL-MCNC: 6.4 G/DL
SODIUM SERPL-SCNC: 136 MMOL/L

## 2024-06-18 ENCOUNTER — NON-APPOINTMENT (OUTPATIENT)
Age: 34
End: 2024-06-18

## 2024-06-18 ENCOUNTER — OUTPATIENT (OUTPATIENT)
Dept: OUTPATIENT SERVICES | Facility: HOSPITAL | Age: 34
LOS: 1 days | End: 2024-06-18
Payer: MEDICAID

## 2024-06-18 ENCOUNTER — APPOINTMENT (OUTPATIENT)
Dept: OPHTHALMOLOGY | Facility: CLINIC | Age: 34
End: 2024-06-18
Payer: MEDICAID

## 2024-06-18 ENCOUNTER — ASOB RESULT (OUTPATIENT)
Age: 34
End: 2024-06-18

## 2024-06-18 ENCOUNTER — APPOINTMENT (OUTPATIENT)
Dept: ANTEPARTUM | Facility: CLINIC | Age: 34
End: 2024-06-18

## 2024-06-18 ENCOUNTER — APPOINTMENT (OUTPATIENT)
Dept: ANTEPARTUM | Facility: CLINIC | Age: 34
End: 2024-06-18
Payer: MEDICAID

## 2024-06-18 VITALS
HEART RATE: 99 BPM | DIASTOLIC BLOOD PRESSURE: 69 MMHG | WEIGHT: 255 LBS | OXYGEN SATURATION: 97 % | SYSTOLIC BLOOD PRESSURE: 104 MMHG | BODY MASS INDEX: 43.77 KG/M2

## 2024-06-18 DIAGNOSIS — H00.16 CHALAZION LEFT EYE, UNSPECIFIED EYELID: ICD-10-CM

## 2024-06-18 DIAGNOSIS — R79.89 OTHER SPECIFIED ABNORMAL FINDINGS OF BLOOD CHEMISTRY: ICD-10-CM

## 2024-06-18 DIAGNOSIS — O09.90 SUPERVISION OF HIGH RISK PREGNANCY, UNSPECIFIED, UNSPECIFIED TRIMESTER: ICD-10-CM

## 2024-06-18 DIAGNOSIS — Z34.90 ENCOUNTER FOR SUPERVISION OF NORMAL PREGNANCY, UNSPECIFIED, UNSPECIFIED TRIMESTER: ICD-10-CM

## 2024-06-18 DIAGNOSIS — H40.013 OPEN ANGLE WITH BORDERLINE FINDINGS, LOW RISK, BILATERAL: ICD-10-CM

## 2024-06-18 DIAGNOSIS — H53.8 OTHER VISUAL DISTURBANCES: ICD-10-CM

## 2024-06-18 DIAGNOSIS — H00.019 HORDEOLUM EXTERNUM UNSPECIFIED EYE, UNSPECIFIED EYELID: ICD-10-CM

## 2024-06-18 LAB
ALBUMIN SERPL ELPH-MCNC: 3.6 G/DL
ALP BLD-CCNC: 95 U/L
ALT SERPL-CCNC: 43 U/L
ANION GAP SERPL CALC-SCNC: 10 MMOL/L
AST SERPL-CCNC: 15 U/L
BASOPHILS # BLD AUTO: 0.03 K/UL
BASOPHILS NFR BLD AUTO: 0.3 %
BILIRUB SERPL-MCNC: 0.2 MG/DL
BILIRUB UR QL STRIP: NORMAL
BP DIAS: 69 MM HG
BP SYS: 104 MM HG
BUN SERPL-MCNC: 13 MG/DL
CALCIUM SERPL-MCNC: 9 MG/DL
CHLORIDE SERPL-SCNC: 103 MMOL/L
CLARITY UR: CLEAR
CO2 SERPL-SCNC: 24 MMOL/L
COLLECTION METHOD: NORMAL
CREAT SERPL-MCNC: 0.5 MG/DL
EGFR: 125 ML/MIN/1.73M2
EOSINOPHIL # BLD AUTO: 0.19 K/UL
EOSINOPHIL NFR BLD AUTO: 1.7 %
FETAL HEART RATE (BPM): 125
FETAL MOVEMENT: PRESENT
GLUCOSE BLDC GLUCOMTR-MCNC: 140
GLUCOSE SERPL-MCNC: 135 MG/DL
GLUCOSE UR-MCNC: NORMAL
HCG UR QL: 0.2 EU/DL
HCT VFR BLD CALC: 41.7 %
HGB BLD-MCNC: 13.5 G/DL
HGB UR QL STRIP.AUTO: NORMAL
IMM GRANULOCYTES NFR BLD AUTO: 0.5 %
KETONES UR-MCNC: NORMAL
LEUKOCYTE ESTERASE UR QL STRIP: NORMAL
LYMPHOCYTES # BLD AUTO: 2.43 K/UL
LYMPHOCYTES NFR BLD AUTO: 21.9 %
MAN DIFF?: NORMAL
MCHC RBC-ENTMCNC: 27.1 PG
MCHC RBC-ENTMCNC: 32.4 G/DL
MCV RBC AUTO: 83.6 FL
MONOCYTES # BLD AUTO: 0.57 K/UL
MONOCYTES NFR BLD AUTO: 5.1 %
NEUTROPHILS # BLD AUTO: 7.8 K/UL
NEUTROPHILS NFR BLD AUTO: 70.5 %
NITRITE UR QL STRIP: NORMAL
OB COMMENTS: NORMAL
PH UR STRIP: 5
PLATELET # BLD AUTO: 250 K/UL
PMV BLD AUTO: 0 /100 WBCS
POTASSIUM SERPL-SCNC: 3.9 MMOL/L
PROT SERPL-MCNC: 6.4 G/DL
PROT UR STRIP-MCNC: NORMAL
RBC # BLD: 4.99 M/UL
RBC # FLD: 14.2 %
SCHEDULED VISIT: YES
SODIUM SERPL-SCNC: 137 MMOL/L
SP GR UR STRIP: 1.03
URINE ALBUMIN/PROTEIN: NORMAL
URINE GLUCOSE: NORMAL
URINE KETONES: NORMAL
WBC # FLD AUTO: 11.08 K/UL
WEEKS GESTATION: 31.6

## 2024-06-18 PROCEDURE — 76818 FETAL BIOPHYS PROFILE W/NST: CPT | Mod: 26

## 2024-06-18 PROCEDURE — 99214 OFFICE O/P EST MOD 30 MIN: CPT | Mod: TH,25

## 2024-06-18 PROCEDURE — 81002 URINALYSIS NONAUTO W/O SCOPE: CPT

## 2024-06-18 PROCEDURE — 92134 CPTRZ OPH DX IMG PST SGM RTA: CPT

## 2024-06-18 PROCEDURE — 92012 INTRM OPH EXAM EST PATIENT: CPT

## 2024-06-18 PROCEDURE — 82962 GLUCOSE BLOOD TEST: CPT

## 2024-06-18 PROCEDURE — 99214 OFFICE O/P EST MOD 30 MIN: CPT | Mod: 25

## 2024-06-18 PROCEDURE — 76818 FETAL BIOPHYS PROFILE W/NST: CPT

## 2024-06-18 PROCEDURE — 92002 INTRM OPH EXAM NEW PATIENT: CPT | Mod: 25

## 2024-06-18 PROCEDURE — 82948 REAGENT STRIP/BLOOD GLUCOSE: CPT

## 2024-06-18 PROCEDURE — 36415 COLL VENOUS BLD VENIPUNCTURE: CPT

## 2024-06-18 PROCEDURE — 85027 COMPLETE CBC AUTOMATED: CPT

## 2024-06-18 PROCEDURE — 80053 COMPREHEN METABOLIC PANEL: CPT

## 2024-06-18 NOTE — HISTORY OF PRESENT ILLNESS
[FreeTextEntry1] : 24 MFM Att'g & PGY-3 f/u consult note: Ms Negro is referred by Dr Richey.  33y  at 31w6d (FORREST  by [facility]), with pregnancy c/b consultation for T2DM. This pregnancy conceived with ovulation induction on clomid.  She had an asthma exacerbation at the end of May 2024 and was hospitalized.   She is feeling well overall. She is taking her FS fasting and 2 hr after dinner, none after breakfast or lunch. She states she doesnt really eat breakfast and sometimes lunch. She tapered down to 30mg prednisone daily, discussed tapering further to 10mg daily. She saw ophthalmology, normal exam per patient. Denies any fever, chills, sob, wheezing, abdominal pain, cx, vb, lof. Continues to eat a regular diet, no adjustment for diabetic considerations. Taking ASA daily. Denies contractions, leakage of fluid, vaginal bleeding. Endorses decreased fetal movement, did not feel visible movements on ultrasound.  PMHx: DM2: last A1c 6.6% 3/1. Previously on Ozempic and Syngarty, switched to Levermir BID , Lispro tid. Follows with endocrinologist Dr. Robles. Asthma. Hospitalization end of 2024 Denies intubations. Currently on albuterol PRN, symbicort daily, and singular daily. PCOS Meds: albuterol PRN, symbicort daily, and singular daily. Levemir 40AM/48PM, lispro 10/10/10 Metformin: 500mg daily at different times, forgets often. D/C'd . Allergies: seasonal OB Hx: , Missed AB with D&C, ectopic s/p MTX in  Gyn Hx: H/o HSV. Last outbreak in March. denies abnormal pap smears, fibroids, cysts, stds FH: denies pertinent hx, she was adopted. 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 Denies smoking, alcohol or drug use.  Patient discussed barriers to care including difficulty with her insurance and lack of social support. FOB is  to another women and continues that relationship. She states he promised her when the baby is born he will leave that relationship to be with her. Patient is adopted. Patients adopted mother passed aware 2-3 months ago from liver cancer and adoptive father  from diabetes when she was 13. Her biological mother struggles with drug addiction. She states she has 1 friend, Marilee who has offered to help her. Iram's remaining family does not know about the pregnancy. She is reluctant to discuss it with them as she knows the FOB is  to another women. She knows she will eventually need to discuss the pregnancy with them. She currently lives alone with her dog and cat. No psychiatric history.  Vaccinations: Vaccinated for CoVID x2 and Influenza last . Family Planning: per Dr Richey. ROS: as above.  Px: Attentive and cooperative woman, moves easily about room. VS: /69 HR: 99 bpm, O2sat: 97 %, Wt: 255<245<249<247<242<239<238lbs<239lbs<236<235 lbs  (fasting) U dip:  HEENT: Acne rosacea on her face. 1cm hordeolum on left eye. Heart: RRR, no M/R/G.   Lungs: CTAB.  No wheezing. Abd: soft, nontender, nondistended LE: no erythema, edema or pain. Atopic dermatitis on upper extremities.  LAB: Oneg/AntibNeg. HbEP AA 23 A1c 9.1, Hep B NR, Hep C NR, Rubella: equivocal, Frag X: neg, SMA: neg, TB Quant: Neg,CF neg, NIPTs LR 24: Panorama risk reducing. 3/1/24 A1C 6.6%;  mg/24 hours, TSH 1.87, T4 9, 24hr  3/28 UCx nl. MSAFP risk reducing. 6/10 136/4.1/102/18/13/0.5<178 AST//39   Outpatient LFT trend AST/ALT  70/210, 67/170  56/190  50/193 6/2 36/153 6/3 29/131 6/4 28/119 6/ 50/134 6/10 39/113  18 15/43   OBUS: : S=D based on  sono : Normal NT screen 3/1: 16w2d, 186g anatomy wnl : 20w5d, LGA 98% (AC 94%) anatomy wnl but incomplete eval of heart  fetal echo wnl 5/3 25w2d 2lb7oz >99% MVP 7.85cm 6/10 30w5d 4lb9oz 96% AC 95% vtx ant placenta MVP 8.24cm  Impression/Recommendations: Ms Negro is a 34yo  at 31w6d GA FORREST 8/15/2024 with asthma and T2DM.  1. T2DM: Worsening after inpatient steroid course, insulin demand increased significantly. Fasting values: 116, 164, 184, 153, 155, 146, 134, 155 2 hour postprandial dinner values 186, 203, 198, 204, 202, 206, 187, 185 Current Regimen: Levemir 40AM/48PM, lispro 10/10/10 New Regimen : Levemir 40AM/54PM, lispro  -Previously discussed the risk of end organ damage, including eyes, heart, kidneys in patients with diabetes, as well as the risk of coma and death in patients with uncontrolled diabetes. Counseled on risks to fetus of poorly controlled diabetes in pregnancy. -C/w ASA 81 mg PO daily -S/p dietician counseling -3/1 24hr Previously recommended: - Dilated eye exam and foot exam. Ophthalmology s/p appointment with f/u scheduled. Referral for podiatry given previously given. - 3/1 EKG: NSR - Referral previously for cardiology given, encouraged to make appointment - Fetal echo - wnl - Will taper Prednisone to 10 mg.  2. Obesity -Previously discussed that obesity is associated with a host of pregnancy complications. -The patient is at increased risk for preeclampsia and gestational hypertension,  delivery, macrosomia, gestational diabetes, deep venous thrombosis,  delivery, stillbirth and certain birth defects, such as open neural tube defects. - Will need weekly biophysical profiles in third trimester. - Cardiology consult pending  3. Asthma: -Recent prolonged inpatient admission for acute asthma exacerbation end of May -Controlled with prednisone, nebulizer treatments q6h, symbicort daily, singulair daily, albuterol prn -f/u peak flows (she has not been checking them.) - Oxygen saturation at home is around 98 percent. -Continue with current regimen, follows closely with Dr. Nash of Pulmonology off of [facility], appt next week  4. Ocular hordeulum, left - She had an Ophthalmology consult during inpatient admission - warm compresses  5. Rosacea -Recommended gentle face cleanser, azelaic acid, and sunscreen -Dermatology referral given  6. Rh neg. First trimester vaginal bleeding, now resolved s/p Rhogam  - Rhogam given today 6/10/24  7. Pregnancy - History of HSV, was taking Valtrex for suppression. Discontinued during inpatient stay 2024, suspected cause for elevated LFTs, will try acyclovir cream.  2024 AST/ALT 15/43 - Follow with Dr Richey for prenatal care - Fetal movement and labor precautions were discussed.  - Twice weekly  testing.  Follow up in 1 week with a complete fingerstick log and peak flow values.

## 2024-06-18 NOTE — END OF VISIT
[FreeTextEntry3] : [facility] Staff   I saw and evaluated Ms. JAY with Dr. Rivas.   I modified the note above and agree with its contents in the present form.   Asthma and DM, plan as above.  Twice weekly  testing. Follow up in one week.  Reza Sheppard MD

## 2024-06-20 DIAGNOSIS — J45.909 UNSPECIFIED ASTHMA, UNCOMPLICATED: ICD-10-CM

## 2024-06-20 DIAGNOSIS — O24.113 PRE-EXISTING TYPE 2 DIABETES MELLITUS, IN PREGNANCY, THIRD TRIMESTER: ICD-10-CM

## 2024-06-20 DIAGNOSIS — O36.63X0 MATERNAL CARE FOR EXCESSIVE FETAL GROWTH, THIRD TRIMESTER, NOT APPLICABLE OR UNSPECIFIED: ICD-10-CM

## 2024-06-20 DIAGNOSIS — E11.9 TYPE 2 DIABETES MELLITUS WITHOUT COMPLICATIONS: ICD-10-CM

## 2024-06-20 DIAGNOSIS — Z3A.31 31 WEEKS GESTATION OF PREGNANCY: ICD-10-CM

## 2024-06-20 DIAGNOSIS — O09.513 SUPERVISION OF ELDERLY PRIMIGRAVIDA, THIRD TRIMESTER: ICD-10-CM

## 2024-06-20 DIAGNOSIS — O99.213 OBESITY COMPLICATING PREGNANCY, THIRD TRIMESTER: ICD-10-CM

## 2024-06-20 DIAGNOSIS — O98.513 OTHER VIRAL DISEASES COMPLICATING PREGNANCY, THIRD TRIMESTER: ICD-10-CM

## 2024-06-20 DIAGNOSIS — O99.519 DISEASES OF THE RESPIRATORY SYSTEM COMPLICATING PREGNANCY, UNSPECIFIED TRIMESTER: ICD-10-CM

## 2024-06-20 DIAGNOSIS — O24.919 UNSPECIFIED DIABETES MELLITUS IN PREGNANCY, UNSPECIFIED TRIMESTER: ICD-10-CM

## 2024-06-21 ENCOUNTER — ASOB RESULT (OUTPATIENT)
Age: 34
End: 2024-06-21

## 2024-06-21 ENCOUNTER — APPOINTMENT (OUTPATIENT)
Dept: ANTEPARTUM | Facility: CLINIC | Age: 34
End: 2024-06-21

## 2024-06-21 ENCOUNTER — OUTPATIENT (OUTPATIENT)
Dept: INPATIENT UNIT | Facility: HOSPITAL | Age: 34
LOS: 1 days | Discharge: HOME | End: 2024-06-21
Payer: MEDICAID

## 2024-06-21 ENCOUNTER — OUTPATIENT (OUTPATIENT)
Dept: OUTPATIENT SERVICES | Facility: HOSPITAL | Age: 34
LOS: 1 days | End: 2024-06-21
Payer: MEDICAID

## 2024-06-21 VITALS — DIASTOLIC BLOOD PRESSURE: 80 MMHG | HEART RATE: 84 BPM | OXYGEN SATURATION: 97 % | SYSTOLIC BLOOD PRESSURE: 118 MMHG

## 2024-06-21 VITALS — SYSTOLIC BLOOD PRESSURE: 118 MMHG | HEART RATE: 84 BPM | DIASTOLIC BLOOD PRESSURE: 80 MMHG

## 2024-06-21 VITALS — SYSTOLIC BLOOD PRESSURE: 108 MMHG | DIASTOLIC BLOOD PRESSURE: 64 MMHG | HEART RATE: 91 BPM

## 2024-06-21 DIAGNOSIS — O09.90 SUPERVISION OF HIGH RISK PREGNANCY, UNSPECIFIED, UNSPECIFIED TRIMESTER: ICD-10-CM

## 2024-06-21 DIAGNOSIS — Z98.890 OTHER SPECIFIED POSTPROCEDURAL STATES: Chronic | ICD-10-CM

## 2024-06-21 DIAGNOSIS — Z34.90 ENCOUNTER FOR SUPERVISION OF NORMAL PREGNANCY, UNSPECIFIED, UNSPECIFIED TRIMESTER: ICD-10-CM

## 2024-06-21 DIAGNOSIS — O26.899 OTHER SPECIFIED PREGNANCY RELATED CONDITIONS, UNSPECIFIED TRIMESTER: ICD-10-CM

## 2024-06-21 LAB
GLUCOSE BLDC GLUCOMTR-MCNC: 137 MG/DL — HIGH (ref 70–99)
GLUCOSE BLDC GLUCOMTR-MCNC: 151 MG/DL — HIGH (ref 70–99)
GLUCOSE BLDC GLUCOMTR-MCNC: 188 MG/DL — HIGH (ref 70–99)

## 2024-06-21 PROCEDURE — 82962 GLUCOSE BLOOD TEST: CPT

## 2024-06-21 PROCEDURE — 76815 OB US LIMITED FETUS(S): CPT | Mod: 26

## 2024-06-21 PROCEDURE — 99214 OFFICE O/P EST MOD 30 MIN: CPT | Mod: TH,25

## 2024-06-21 PROCEDURE — 76818 FETAL BIOPHYS PROFILE W/NST: CPT

## 2024-06-21 PROCEDURE — 76818 FETAL BIOPHYS PROFILE W/NST: CPT | Mod: 26

## 2024-06-21 PROCEDURE — 99214 OFFICE O/P EST MOD 30 MIN: CPT

## 2024-06-21 PROCEDURE — 76818 FETAL BIOPHYS PROFILE W/NST: CPT | Mod: 26,59

## 2024-06-21 PROCEDURE — 99223 1ST HOSP IP/OBS HIGH 75: CPT | Mod: 25

## 2024-06-21 RX ORDER — DEXTROSE 10 % IN WATER 10 %
125 INTRAVENOUS SOLUTION INTRAVENOUS ONCE
Refills: 0 | Status: DISCONTINUED | OUTPATIENT
Start: 2024-06-21 | End: 2024-06-21

## 2024-06-21 RX ORDER — DEXTROSE 50 % IN WATER 50 %
25 SYRINGE (ML) INTRAVENOUS ONCE
Refills: 0 | Status: DISCONTINUED | OUTPATIENT
Start: 2024-06-21 | End: 2024-06-21

## 2024-06-21 RX ORDER — SODIUM CHLORIDE 9 MG/ML
1000 INJECTION, SOLUTION INTRAVENOUS
Refills: 0 | Status: DISCONTINUED | OUTPATIENT
Start: 2024-06-21 | End: 2024-06-21

## 2024-06-21 RX ORDER — EMPAGLIFLOZIN, METFORMIN HYDROCHLORIDE 10; 1000 MG/1; MG/1
1 TABLET, EXTENDED RELEASE ORAL
Qty: 0 | Refills: 0 | DISCHARGE

## 2024-06-21 RX ORDER — DEXTROSE 50 % IN WATER 50 %
15 SYRINGE (ML) INTRAVENOUS ONCE
Refills: 0 | Status: DISCONTINUED | OUTPATIENT
Start: 2024-06-21 | End: 2024-06-21

## 2024-06-21 RX ORDER — SEMAGLUTIDE 0.68 MG/ML
0 INJECTION, SOLUTION SUBCUTANEOUS
Qty: 0 | Refills: 0 | DISCHARGE

## 2024-06-21 RX ORDER — INSULIN LISPRO 100/ML
14 VIAL (ML) SUBCUTANEOUS
Refills: 0 | Status: DISCONTINUED | OUTPATIENT
Start: 2024-06-21 | End: 2024-06-21

## 2024-06-21 RX ORDER — DEXTROSE 50 % IN WATER 50 %
12.5 SYRINGE (ML) INTRAVENOUS ONCE
Refills: 0 | Status: DISCONTINUED | OUTPATIENT
Start: 2024-06-21 | End: 2024-06-21

## 2024-06-21 RX ORDER — BUDESONIDE AND FORMOTEROL FUMARATE DIHYDRATE 160; 4.5 UG/1; UG/1
0 AEROSOL RESPIRATORY (INHALATION)
Qty: 0 | Refills: 0 | DISCHARGE

## 2024-06-21 RX ORDER — GLUCAGON INJECTION, SOLUTION 0.5 MG/.1ML
1 INJECTION, SOLUTION SUBCUTANEOUS ONCE
Refills: 0 | Status: DISCONTINUED | OUTPATIENT
Start: 2024-06-21 | End: 2024-06-21

## 2024-06-21 RX ORDER — METFORMIN HYDROCHLORIDE 850 MG/1
0 TABLET ORAL
Qty: 0 | Refills: 0 | DISCHARGE

## 2024-06-21 RX ORDER — INSULIN GLARGINE 100 [IU]/ML
50 INJECTION, SOLUTION SUBCUTANEOUS ONCE
Refills: 0 | Status: COMPLETED | OUTPATIENT
Start: 2024-06-21 | End: 2024-06-21

## 2024-06-21 RX ORDER — MONTELUKAST 4 MG/1
1 TABLET, CHEWABLE ORAL
Qty: 0 | Refills: 0 | DISCHARGE

## 2024-06-21 RX ADMIN — INSULIN GLARGINE 50 UNIT(S): 100 INJECTION, SOLUTION SUBCUTANEOUS at 17:40

## 2024-06-21 RX ADMIN — Medication 14 UNIT(S): at 17:39

## 2024-06-21 NOTE — OB PROVIDER TRIAGE NOTE - NSOBPROVIDERNOTE_OBGYN_ALL_OB_FT
33y.o.  @ 32.2wks fpr prolonged fetal monitoring, T2DM, h/o Asthma, h/o HSV, AMA 33y.o.  @ 32.2wks fpr prolonged fetal monitoring, T2DM, h/o Asthma, h/o HSV, AMA. Morbid obesity  - Continuous efm and toco  - Dr. Jaramillo/ Dr. Sneha vasquez 33y.o.  @ 32.2wks for prolonged fetal monitoring, polyhydramnios, T2DM, h/o Asthma, h/o HSV, AMA. Morbid obesity  - Continuous efm and toco  - Dr. Jaramillo/ Dr. Hoover aware

## 2024-06-21 NOTE — OB PROVIDER TRIAGE NOTE - NSICDXPASTMEDICALHX_GEN_ALL_CORE_FT
PAST MEDICAL HISTORY:  Asthma     Diabetes mellitus, type 2     Eczema     PCOS (polycystic ovarian syndrome)     Rh negative status during pregnancy     Rosacea      PAST MEDICAL HISTORY:  Asthma     Diabetes mellitus, type 2     Eczema     History of herpes genitalis     PCOS (polycystic ovarian syndrome)     Rh negative status during pregnancy     Rosacea

## 2024-06-21 NOTE — OB PROVIDER TRIAGE NOTE - NSICDXPASTSURGICALHX_GEN_ALL_CORE_FT
PAST SURGICAL HISTORY:  No significant past surgical history      PAST SURGICAL HISTORY:  History of dilatation and curettage

## 2024-06-21 NOTE — OB PROVIDER TRIAGE NOTE - HISTORY OF PRESENT ILLNESS
Pt is a 33y.o.  @ 32.2wks dated by sujata @ 9wks presents from [facility] for prolonged monitoring. Pt had Cat II FHR tracing at [facility] Pt denies ctx, LOF or VB and reports good FM     Pregnancy complicated by:   1- T2DM on Insulin, current regimen Levemir 40AM/48PM, lispro 10/10/10  2- h/o HSV w/ outbreak this pregnancy  3- h/o Asthma- Hospitalization end of 2024 Denies intubations. Currently on albuterol PRN, symbicort daily, and singular daily Pt is a 33y.o.  @ 32.2wks dated by sujata @ 9wks presents from [facility] for prolonged monitoring. Pt had fetal bradycardia on sonogram.  Pt denies ctx, LOF or VB and reports good FM     Pregnancy complicated by:   1- T2DM on Insulin, current regimen Levemir 55AM/55PM Humalog   2- h/o HSV-2 w/ outbreak 2 wks ago (pt had elevated LFT's during hospital admission and Valtrex was discontinued). Pt denies prodromal symptoms/ Outbreak at this time. Currently on Valtrex 1000mg QD  3- h/o Asthma- Hospitalization end of 2024 Denies intubations. Currently on Prednisone 10mg QD, albuterol PRN, symbicort daily, and singular daily Pt is a 33y.o.  @ 32.2wks dated by sujata @ 9wks presents from [facility] for prolonged monitoring. During BPP, while pt was in supine position, a one   minute deceleration to 60bpm was observed.  FHR returned to 120-130 as soon as pt was repositioned to L lateral position. BPP today 10/10 w/ polyhydramnios MVP 8.7. Pt denies ctx, LOF or VB and reports good FM     Pregnancy complicated by:   1- T2DM- poorly controlled      current Insulin regimen:  Levemir 55AM/55PM Humalog   2- Obesity- BMI 43,8  3- h/o HSV-2      last outbreak 2 wks ago (pt had elevated LFT's during hospital admission and Valtrex was discontinued). Pt denies prodromal symptoms/ Outbreak at this time. Currently on Valtrex 1000mg QD  4- h/o Asthma      Hospitalization 2024 Denies intubations. Currently on Prednisone 10mg QD, albuterol PRN, symbicort daily, and singular daily

## 2024-06-21 NOTE — OB PROVIDER TRIAGE NOTE - NS_OBGYNHISTORY_OBGYN_ALL_OB_FT
Ectopic x 1    GYNHx: h/o HSV w/ recent outbreak, h/o PCOS Ectopic x 1 tx w/ MTX  SAB w/ D&C x 1    GYNHx: h/o HSV w/ recent outbreak 5/2024, h/o PCOS Ectopic tx w/ MTX  MAB 1st trimester w/ D&C x 1    GYNHx: h/o HSV w/ recent outbreak 5/2024, h/o PCOS, denies other STI, PID, abnl PAP, fibroids

## 2024-06-21 NOTE — OB PROVIDER TRIAGE NOTE - NSHPLABSRESULTS_GEN_ALL_CORE
12/28/2024  Type and screen O negative, Ab negative  RPR nonreactive  HBSag nonreactive  HCV Ab negative  HIV nonreactive  Rubella immune    AFP negative  NIPT low risk  SMA negative  CF screen negative  Fragile X negative    Gonorrhea neg  Chlamydia neg    12/28 Hgb A1c 9.1    OBUS  1/25: S=D based on 12/28 sono  2/7: Normal NT screen  Sono @ 16w2d, 186g anatomy wnl  Sono @ 20w5d, LGA 98% (AC 94%) anatomy wnl but incomplete eval of heart  4/16 fetal echo wnl  Sono @ 25w2d 2lb7oz >99% MVP 7.85cm  Sono @ @31w6d variable, ant placenta, no previa, BPP 10/10. Poly MVP 8.77  ?New Regimen : Levemir 40AM/52PM, lispro 10/10/10 (decrease evening levemir back to 48 on Saturday after tapering to 30mg prednisone daily)    RhNeg. First trimester vaginal bleeding, now resolved s/p Rhogam 1/11- Rhogam given today 6/10/24 12/28/2024  Type and screen O negative, Ab negative  RPR nonreactive  HBSag nonreactive  HCV Ab negative  HIV nonreactive  Rubella immune    AFP negative  NIPT low risk  SMA negative  CF screen negative  Fragile X negative    Gonorrhea neg  Chlamydia neg    12/28 Hgb A1c 9.1    OBUS  1/25: S=D based on 12/28 sono  2/7: Normal NT screen  Sono @ 16w2d, 186g anatomy wnl  Sono @ 20w5d, LGA 98% (AC 94%) anatomy wnl but incomplete eval of heart  4/16 fetal echo wnl  Sono @ 25w2d 2lb7oz >99% MVP 7.85cm  Sono @ @31w6d variable, ant placenta, no previa, BPP 10/10. Poly MVP 8.77 12/28/2024  Type and screen O negative, Ab negative  RPR nonreactive  HBSag nonreactive  HCV Ab negative  HIV nonreactive  Rubella immune    AFP negative  NIPT low risk  SMA negative  CF screen negative  Fragile X negative    Gonorrhea neg  Chlamydia neg    12/28 Hgb A1c 9.1    OBUS  1/25: S=D based on 12/28 sono  Sono @ 13wks Normal NT screen  Sono @ 16w2d, 186g anatomy wnl  Sono @ 20w5d, LGA 98% (AC 94%) anatomy wnl but incomplete eval of heart  4/16 fetal echo wnl  Sono @ 25w2d 2lb7oz >99% MVP 7.85cm  Sono @ @31w6d variable, ant placenta, no previa, BPP 10/10. Poly MVP 8.77 12/28/2024  Type and screen O negative, Ab negative  RPR nonreactive  HBSag nonreactive  HCV Ab negative  HIV nonreactive  Rubella immune    AFP negative  NIPT low risk  SMA negative  CF screen negative  Fragile X negative    Gonorrhea neg  Chlamydia neg    12/28 Hgb A1c 9.1    OBUS  1/25: S=D based on 12/28 sono  Sono @ 13wks Normal NT screen  Sono @ 16w2d, 186g anatomy wnl  Sono @ 20w5d, LGA 98% (AC 94%) anatomy wnl but incomplete eval of heart  4/16 fetal echo wnl  Sono @ 25w2d 2lb7oz >99% MVP 7.85cm  Sono @31w6d variable, ant placenta, no previa, BPP 10/10. Poly MVP 8.77  Sono @ 32.2wks vtx, ant placenta, BPP 10/10 Poly 8.67cm

## 2024-06-21 NOTE — OB PROVIDER TRIAGE NOTE - ADDITIONAL INSTRUCTIONS
If you experience more than 4 contractions in 20 minutes, Leaking of fluid or vaginal bleeding, call your doctor/ return to the hospital  Increase your fluid intake  Continue with prescribed insulin regimen and finger sticks  Followup with scheduled appointment take your insulin as prescribed

## 2024-06-21 NOTE — OB PROVIDER TRIAGE NOTE - NSHPPHYSICALEXAM_GEN_ALL_CORE
T(C): --  HR: 84 (06-21-24 @ 09:30) (84 - 84)  BP: 118/80 (06-21-24 @ 09:30) (118/80 - 118/80)  RR: --  SpO2: -- T(C): --  HR: 84 (06-21-24 @ 09:30) (84 - 84)  BP: 118/80 (06-21-24 @ 09:30) (118/80 - 118/80)    Finger stick 188    Abd: gravid, NT  VE deferred  Ctx: no ctx  FHR: 130 mdoerate variability, Cat I

## 2024-06-21 NOTE — OB PROVIDER TRIAGE NOTE - NS ATTEND AMEND GEN_ALL_CORE FT
33y.o.  @ 32.2wks for prolonged fetal monitoring, polyhydramnios, T2DM, h/o Asthma, h/o HSV, AMA. Morbid obesity  Continuous EFM and toco. Patient was evaluated for 6 hrs. EFM reviewed by BENITO.   FS noted to be elevated. Plan to give home insulin regimen and repeat prior to discharge  Maternal and fetal status otherwise reassuring   precautions given

## 2024-06-23 ENCOUNTER — INPATIENT (INPATIENT)
Facility: HOSPITAL | Age: 34
LOS: 6 days | Discharge: HOME | DRG: 566 | End: 2024-06-30
Attending: OBSTETRICS & GYNECOLOGY | Admitting: OBSTETRICS & GYNECOLOGY
Payer: MEDICAID

## 2024-06-23 VITALS — HEART RATE: 104 BPM | SYSTOLIC BLOOD PRESSURE: 146 MMHG | DIASTOLIC BLOOD PRESSURE: 79 MMHG

## 2024-06-23 DIAGNOSIS — Z98.890 OTHER SPECIFIED POSTPROCEDURAL STATES: Chronic | ICD-10-CM

## 2024-06-23 DIAGNOSIS — O26.899 OTHER SPECIFIED PREGNANCY RELATED CONDITIONS, UNSPECIFIED TRIMESTER: ICD-10-CM

## 2024-06-23 DIAGNOSIS — O26.893 OTHER SPECIFIED PREGNANCY RELATED CONDITIONS, THIRD TRIMESTER: ICD-10-CM

## 2024-06-23 PROBLEM — Z86.19 PERSONAL HISTORY OF OTHER INFECTIOUS AND PARASITIC DISEASES: Chronic | Status: ACTIVE | Noted: 2024-06-21

## 2024-06-23 LAB
ALBUMIN SERPL ELPH-MCNC: 3.6 G/DL — SIGNIFICANT CHANGE UP (ref 3.5–5.2)
ALP SERPL-CCNC: 112 U/L — SIGNIFICANT CHANGE UP (ref 30–115)
ALT FLD-CCNC: 55 U/L — HIGH (ref 0–41)
ANION GAP SERPL CALC-SCNC: 13 MMOL/L — SIGNIFICANT CHANGE UP (ref 7–14)
APPEARANCE UR: CLEAR — SIGNIFICANT CHANGE UP
APTT BLD: 30.8 SEC — SIGNIFICANT CHANGE UP (ref 27–39.2)
AST SERPL-CCNC: 41 U/L — SIGNIFICANT CHANGE UP (ref 0–41)
BACTERIA # UR AUTO: NEGATIVE /HPF — SIGNIFICANT CHANGE UP
BASOPHILS # BLD AUTO: 0.04 K/UL — SIGNIFICANT CHANGE UP (ref 0–0.2)
BASOPHILS NFR BLD AUTO: 0.3 % — SIGNIFICANT CHANGE UP (ref 0–1)
BILIRUB SERPL-MCNC: <0.2 MG/DL — SIGNIFICANT CHANGE UP (ref 0.2–1.2)
BILIRUB UR-MCNC: NEGATIVE — SIGNIFICANT CHANGE UP
BUN SERPL-MCNC: 16 MG/DL — SIGNIFICANT CHANGE UP (ref 10–20)
CALCIUM SERPL-MCNC: 9.6 MG/DL — SIGNIFICANT CHANGE UP (ref 8.4–10.5)
CAST: 2 /LPF — SIGNIFICANT CHANGE UP (ref 0–4)
CHLORIDE SERPL-SCNC: 105 MMOL/L — SIGNIFICANT CHANGE UP (ref 98–110)
CO2 SERPL-SCNC: 18 MMOL/L — SIGNIFICANT CHANGE UP (ref 17–32)
COLOR SPEC: YELLOW — SIGNIFICANT CHANGE UP
CREAT ?TM UR-MCNC: 66 MG/DL — SIGNIFICANT CHANGE UP
CREAT SERPL-MCNC: 0.6 MG/DL — LOW (ref 0.7–1.5)
DIFF PNL FLD: ABNORMAL
EGFR: 120 ML/MIN/1.73M2 — SIGNIFICANT CHANGE UP
EOSINOPHIL # BLD AUTO: 0.18 K/UL — SIGNIFICANT CHANGE UP (ref 0–0.7)
EOSINOPHIL NFR BLD AUTO: 1.5 % — SIGNIFICANT CHANGE UP (ref 0–8)
FIBRINOGEN PPP-MCNC: 523 MG/DL — HIGH (ref 200–435)
GLUCOSE BLDC GLUCOMTR-MCNC: 115 MG/DL — HIGH (ref 70–99)
GLUCOSE BLDC GLUCOMTR-MCNC: 115 MG/DL — HIGH (ref 70–99)
GLUCOSE SERPL-MCNC: 117 MG/DL — HIGH (ref 70–99)
GLUCOSE UR QL: >=1000 MG/DL
HCT VFR BLD CALC: 42 % — SIGNIFICANT CHANGE UP (ref 37–47)
HGB BLD-MCNC: 14 G/DL — SIGNIFICANT CHANGE UP (ref 12–16)
HIV 1 & 2 AB SERPL IA.RAPID: SIGNIFICANT CHANGE UP
IMM GRANULOCYTES NFR BLD AUTO: 0.6 % — HIGH (ref 0.1–0.3)
INR BLD: 0.83 RATIO — SIGNIFICANT CHANGE UP (ref 0.65–1.3)
KETONES UR-MCNC: NEGATIVE MG/DL — SIGNIFICANT CHANGE UP
LDH SERPL L TO P-CCNC: 329 — HIGH (ref 50–242)
LEUKOCYTE ESTERASE UR-ACNC: NEGATIVE — SIGNIFICANT CHANGE UP
LYMPHOCYTES # BLD AUTO: 2.69 K/UL — SIGNIFICANT CHANGE UP (ref 1.2–3.4)
LYMPHOCYTES # BLD AUTO: 22.2 % — SIGNIFICANT CHANGE UP (ref 20.5–51.1)
MCHC RBC-ENTMCNC: 26.9 PG — LOW (ref 27–31)
MCHC RBC-ENTMCNC: 33.3 G/DL — SIGNIFICANT CHANGE UP (ref 32–37)
MCV RBC AUTO: 80.8 FL — LOW (ref 81–99)
MONOCYTES # BLD AUTO: 0.74 K/UL — HIGH (ref 0.1–0.6)
MONOCYTES NFR BLD AUTO: 6.1 % — SIGNIFICANT CHANGE UP (ref 1.7–9.3)
NEUTROPHILS # BLD AUTO: 8.41 K/UL — HIGH (ref 1.4–6.5)
NEUTROPHILS NFR BLD AUTO: 69.3 % — SIGNIFICANT CHANGE UP (ref 42.2–75.2)
NITRITE UR-MCNC: NEGATIVE — SIGNIFICANT CHANGE UP
NRBC # BLD: 0 /100 WBCS — SIGNIFICANT CHANGE UP (ref 0–0)
PH UR: 5.5 — SIGNIFICANT CHANGE UP (ref 5–8)
PLATELET # BLD AUTO: 267 K/UL — SIGNIFICANT CHANGE UP (ref 130–400)
PMV BLD: 10.4 FL — SIGNIFICANT CHANGE UP (ref 7.4–10.4)
POTASSIUM SERPL-MCNC: 5 MMOL/L — SIGNIFICANT CHANGE UP (ref 3.5–5)
POTASSIUM SERPL-SCNC: 5 MMOL/L — SIGNIFICANT CHANGE UP (ref 3.5–5)
PRENATAL SYPHILIS TEST: SIGNIFICANT CHANGE UP
PROT ?TM UR-MCNC: 37 MG/DLG/24H — SIGNIFICANT CHANGE UP
PROT SERPL-MCNC: 6.4 G/DL — SIGNIFICANT CHANGE UP (ref 6–8)
PROT UR-MCNC: SIGNIFICANT CHANGE UP MG/DL
PROT/CREAT UR-RTO: 0.6 RATIO — HIGH (ref 0–0.2)
PROTHROM AB SERPL-ACNC: 9.4 SEC — LOW (ref 9.95–12.87)
RBC # BLD: 5.2 M/UL — SIGNIFICANT CHANGE UP (ref 4.2–5.4)
RBC # FLD: 14.4 % — SIGNIFICANT CHANGE UP (ref 11.5–14.5)
RBC CASTS # UR COMP ASSIST: 28 /HPF — HIGH (ref 0–4)
SODIUM SERPL-SCNC: 136 MMOL/L — SIGNIFICANT CHANGE UP (ref 135–146)
SP GR SPEC: >1.03 — HIGH (ref 1–1.03)
SQUAMOUS # UR AUTO: 5 /HPF — SIGNIFICANT CHANGE UP (ref 0–5)
URATE SERPL-MCNC: 5.6 MG/DL — SIGNIFICANT CHANGE UP (ref 2.5–7)
UROBILINOGEN FLD QL: 0.2 MG/DL — SIGNIFICANT CHANGE UP (ref 0.2–1)
WBC # BLD: 12.13 K/UL — HIGH (ref 4.8–10.8)
WBC # FLD AUTO: 12.13 K/UL — HIGH (ref 4.8–10.8)
WBC UR QL: 0 /HPF — SIGNIFICANT CHANGE UP (ref 0–5)

## 2024-06-23 PROCEDURE — 86077 PHYS BLOOD BANK SERV XMATCH: CPT

## 2024-06-23 PROCEDURE — 86900 BLOOD TYPING SEROLOGIC ABO: CPT

## 2024-06-23 PROCEDURE — 85730 THROMBOPLASTIN TIME PARTIAL: CPT

## 2024-06-23 PROCEDURE — 99418 PROLNG IP/OBS E/M EA 15 MIN: CPT | Mod: 25

## 2024-06-23 PROCEDURE — 74018 RADEX ABDOMEN 1 VIEW: CPT

## 2024-06-23 PROCEDURE — 82962 GLUCOSE BLOOD TEST: CPT

## 2024-06-23 PROCEDURE — 59025 FETAL NON-STRESS TEST: CPT

## 2024-06-23 PROCEDURE — 85610 PROTHROMBIN TIME: CPT

## 2024-06-23 PROCEDURE — 86870 RBC ANTIBODY IDENTIFICATION: CPT

## 2024-06-23 PROCEDURE — 36415 COLL VENOUS BLD VENIPUNCTURE: CPT

## 2024-06-23 PROCEDURE — 93970 EXTREMITY STUDY: CPT

## 2024-06-23 PROCEDURE — 86901 BLOOD TYPING SEROLOGIC RH(D): CPT

## 2024-06-23 PROCEDURE — 99223 1ST HOSP IP/OBS HIGH 75: CPT | Mod: 25

## 2024-06-23 PROCEDURE — 85461 HEMOGLOBIN FETAL: CPT

## 2024-06-23 PROCEDURE — 86880 COOMBS TEST DIRECT: CPT

## 2024-06-23 PROCEDURE — 94640 AIRWAY INHALATION TREATMENT: CPT

## 2024-06-23 PROCEDURE — 88307 TISSUE EXAM BY PATHOLOGIST: CPT

## 2024-06-23 PROCEDURE — 86850 RBC ANTIBODY SCREEN: CPT

## 2024-06-23 PROCEDURE — 90384 RH IG FULL-DOSE IM: CPT

## 2024-06-23 PROCEDURE — 85384 FIBRINOGEN ACTIVITY: CPT

## 2024-06-23 PROCEDURE — 85025 COMPLETE CBC W/AUTO DIFF WBC: CPT

## 2024-06-23 RX ORDER — DEXTROSE MONOHYDRATE 100 MG/ML
125 INJECTION, SOLUTION INTRAVENOUS ONCE
Refills: 0 | Status: DISCONTINUED | OUTPATIENT
Start: 2024-06-23 | End: 2024-06-24

## 2024-06-23 RX ORDER — DEXTROSE 30 % IN WATER 30 %
12.5 VIAL (ML) INTRAVENOUS ONCE
Refills: 0 | Status: DISCONTINUED | OUTPATIENT
Start: 2024-06-23 | End: 2024-06-24

## 2024-06-23 RX ORDER — INSULIN LISPRO 100 [IU]/ML
14 INJECTION, SOLUTION SUBCUTANEOUS
Refills: 0 | Status: DISCONTINUED | OUTPATIENT
Start: 2024-06-24 | End: 2024-06-24

## 2024-06-23 RX ORDER — AZITHROMYCIN 250 MG/1
1000 TABLET, FILM COATED ORAL ONCE
Refills: 0 | Status: COMPLETED | OUTPATIENT
Start: 2024-06-23 | End: 2024-06-23

## 2024-06-23 RX ORDER — VALACYCLOVIR HYDROCHLORIDE 500 MG/1
500 TABLET, FILM COATED ORAL EVERY 12 HOURS
Refills: 0 | Status: DISCONTINUED | OUTPATIENT
Start: 2024-06-23 | End: 2024-06-30

## 2024-06-23 RX ORDER — BETAMETHASONE SODIUM PHOSPHATE
12 POWDER (GRAM) MISCELLANEOUS EVERY 24 HOURS
Refills: 0 | Status: DISCONTINUED | OUTPATIENT
Start: 2024-06-23 | End: 2024-06-24

## 2024-06-23 RX ORDER — DEXTROSE MONOHYDRATE AND SODIUM CHLORIDE 5; .3 G/100ML; G/100ML
1000 INJECTION, SOLUTION INTRAVENOUS
Refills: 0 | Status: DISCONTINUED | OUTPATIENT
Start: 2024-06-23 | End: 2024-06-24

## 2024-06-23 RX ORDER — GLUCAGON HYDROCHLORIDE 1 MG/ML
1 INJECTION, POWDER, FOR SOLUTION INTRAMUSCULAR; INTRAVENOUS; SUBCUTANEOUS ONCE
Refills: 0 | Status: DISCONTINUED | OUTPATIENT
Start: 2024-06-23 | End: 2024-06-24

## 2024-06-23 RX ORDER — INSULIN DETEMIR 100/ML
54 VIAL (ML) SUBCUTANEOUS AT BEDTIME
Refills: 0 | Status: DISCONTINUED | OUTPATIENT
Start: 2024-06-23 | End: 2024-06-24

## 2024-06-23 RX ORDER — MONTELUKAST SODIUM 10 MG/1
10 TABLET, FILM COATED ORAL DAILY
Refills: 0 | Status: DISCONTINUED | OUTPATIENT
Start: 2024-06-24 | End: 2024-06-30

## 2024-06-23 RX ORDER — BUDESONIDE/FORMOTEROL FUMARATE 160-4.5MCG
2 HFA AEROSOL WITH ADAPTER (GRAM) INHALATION
Refills: 0 | Status: DISCONTINUED | OUTPATIENT
Start: 2024-06-23 | End: 2024-06-30

## 2024-06-23 RX ORDER — AMPICILLIN TRIHYDRATE 250 MG
2 CAPSULE ORAL EVERY 6 HOURS
Refills: 0 | Status: DISCONTINUED | OUTPATIENT
Start: 2024-06-24 | End: 2024-06-24

## 2024-06-23 RX ORDER — ALBUTEROL 90 MCG
2 AEROSOL REFILL (GRAM) INHALATION EVERY 6 HOURS
Refills: 0 | Status: DISCONTINUED | OUTPATIENT
Start: 2024-06-23 | End: 2024-06-30

## 2024-06-23 RX ORDER — DEXTROSE 30 % IN WATER 30 %
15 VIAL (ML) INTRAVENOUS ONCE
Refills: 0 | Status: DISCONTINUED | OUTPATIENT
Start: 2024-06-23 | End: 2024-06-24

## 2024-06-23 RX ORDER — VALACYCLOVIR HYDROCHLORIDE 500 MG/1
1000 TABLET, FILM COATED ORAL DAILY
Refills: 0 | Status: DISCONTINUED | OUTPATIENT
Start: 2024-06-23 | End: 2024-06-23

## 2024-06-23 RX ORDER — MONTELUKAST SODIUM 10 MG/1
10 TABLET, FILM COATED ORAL DAILY
Refills: 0 | Status: DISCONTINUED | OUTPATIENT
Start: 2024-06-23 | End: 2024-06-23

## 2024-06-23 RX ORDER — PREDNISONE 10 MG/1
10 TABLET ORAL DAILY
Refills: 0 | Status: DISCONTINUED | OUTPATIENT
Start: 2024-06-23 | End: 2024-06-23

## 2024-06-23 RX ORDER — DEXTROSE MONOHYDRATE AND SODIUM CHLORIDE 5; .3 G/100ML; G/100ML
1000 INJECTION, SOLUTION INTRAVENOUS
Refills: 0 | Status: DISCONTINUED | OUTPATIENT
Start: 2024-06-23 | End: 2024-06-23

## 2024-06-23 RX ORDER — AMPICILLIN TRIHYDRATE 250 MG
CAPSULE ORAL
Refills: 0 | Status: DISCONTINUED | OUTPATIENT
Start: 2024-06-23 | End: 2024-06-24

## 2024-06-23 RX ORDER — INSULIN LISPRO 100 [IU]/ML
14 INJECTION, SOLUTION SUBCUTANEOUS
Refills: 0 | Status: DISCONTINUED | OUTPATIENT
Start: 2024-06-23 | End: 2024-06-24

## 2024-06-23 RX ORDER — DEXTROSE 30 % IN WATER 30 %
25 VIAL (ML) INTRAVENOUS ONCE
Refills: 0 | Status: DISCONTINUED | OUTPATIENT
Start: 2024-06-23 | End: 2024-06-24

## 2024-06-23 RX ORDER — AMOXICILLIN 500 MG
500 CAPSULE ORAL EVERY 8 HOURS
Refills: 0 | Status: DISCONTINUED | OUTPATIENT
Start: 2024-06-23 | End: 2024-06-24

## 2024-06-23 RX ORDER — INSULIN DETEMIR 100/ML
40 VIAL (ML) SUBCUTANEOUS
Refills: 0 | Status: DISCONTINUED | OUTPATIENT
Start: 2024-06-24 | End: 2024-06-24

## 2024-06-23 RX ORDER — AMPICILLIN TRIHYDRATE 250 MG
2 CAPSULE ORAL ONCE
Refills: 0 | Status: COMPLETED | OUTPATIENT
Start: 2024-06-23 | End: 2024-06-23

## 2024-06-23 RX ADMIN — VALACYCLOVIR HYDROCHLORIDE 500 MILLIGRAM(S): 500 TABLET, FILM COATED ORAL at 23:52

## 2024-06-23 RX ADMIN — AZITHROMYCIN 1000 MILLIGRAM(S): 250 TABLET, FILM COATED ORAL at 23:51

## 2024-06-23 RX ADMIN — Medication 2 PUFF(S): at 20:51

## 2024-06-23 RX ADMIN — Medication 12 MILLIGRAM(S): at 16:22

## 2024-06-23 RX ADMIN — Medication 100 GRAM(S): at 23:52

## 2024-06-23 RX ADMIN — Medication 54 UNIT(S): at 22:43

## 2024-06-24 ENCOUNTER — NON-APPOINTMENT (OUTPATIENT)
Age: 34
End: 2024-06-24

## 2024-06-24 ENCOUNTER — APPOINTMENT (OUTPATIENT)
Dept: OBGYN | Facility: CLINIC | Age: 34
End: 2024-06-24

## 2024-06-24 DIAGNOSIS — O09.523 SUPERVISION OF ELDERLY MULTIGRAVIDA, THIRD TRIMESTER: ICD-10-CM

## 2024-06-24 DIAGNOSIS — E28.2 POLYCYSTIC OVARIAN SYNDROME: ICD-10-CM

## 2024-06-24 DIAGNOSIS — O40.3XX0 POLYHYDRAMNIOS, THIRD TRIMESTER, NOT APPLICABLE OR UNSPECIFIED: ICD-10-CM

## 2024-06-24 DIAGNOSIS — E11.9 TYPE 2 DIABETES MELLITUS WITHOUT COMPLICATIONS: ICD-10-CM

## 2024-06-24 DIAGNOSIS — O24.113 PRE-EXISTING TYPE 2 DIABETES MELLITUS, IN PREGNANCY, THIRD TRIMESTER: ICD-10-CM

## 2024-06-24 DIAGNOSIS — Z3A.32 32 WEEKS GESTATION OF PREGNANCY: ICD-10-CM

## 2024-06-24 DIAGNOSIS — O09.293 SUPERVISION OF PREGNANCY WITH OTHER POOR REPRODUCTIVE OR OBSTETRIC HISTORY, THIRD TRIMESTER: ICD-10-CM

## 2024-06-24 DIAGNOSIS — Z79.4 LONG TERM (CURRENT) USE OF INSULIN: ICD-10-CM

## 2024-06-24 DIAGNOSIS — O36.8330 MATERNAL CARE FOR ABNORMALITIES OF THE FETAL HEART RATE OR RHYTHM, THIRD TRIMESTER, NOT APPLICABLE OR UNSPECIFIED: ICD-10-CM

## 2024-06-24 DIAGNOSIS — O99.513 DISEASES OF THE RESPIRATORY SYSTEM COMPLICATING PREGNANCY, THIRD TRIMESTER: ICD-10-CM

## 2024-06-24 DIAGNOSIS — O99.283 ENDOCRINE, NUTRITIONAL AND METABOLIC DISEASES COMPLICATING PREGNANCY, THIRD TRIMESTER: ICD-10-CM

## 2024-06-24 DIAGNOSIS — L71.9 ROSACEA, UNSPECIFIED: ICD-10-CM

## 2024-06-24 DIAGNOSIS — O09.13 SUPERVISION OF PREGNANCY WITH HISTORY OF ECTOPIC PREGNANCY, THIRD TRIMESTER: ICD-10-CM

## 2024-06-24 DIAGNOSIS — J45.909 UNSPECIFIED ASTHMA, UNCOMPLICATED: ICD-10-CM

## 2024-06-24 DIAGNOSIS — E66.01 MORBID (SEVERE) OBESITY DUE TO EXCESS CALORIES: ICD-10-CM

## 2024-06-24 DIAGNOSIS — O99.713 DISEASES OF THE SKIN AND SUBCUTANEOUS TISSUE COMPLICATING PREGNANCY, THIRD TRIMESTER: ICD-10-CM

## 2024-06-24 DIAGNOSIS — O46.90 ANTEPARTUM HEMORRHAGE, UNSPECIFIED, UNSPECIFIED TRIMESTER: ICD-10-CM

## 2024-06-24 DIAGNOSIS — O99.213 OBESITY COMPLICATING PREGNANCY, THIRD TRIMESTER: ICD-10-CM

## 2024-06-24 LAB
APTT BLD: 28.8 SEC — SIGNIFICANT CHANGE UP (ref 27–39.2)
BASOPHILS # BLD AUTO: 0 K/UL — SIGNIFICANT CHANGE UP (ref 0–0.2)
BASOPHILS # BLD AUTO: 0.02 K/UL — SIGNIFICANT CHANGE UP (ref 0–0.2)
BASOPHILS # BLD AUTO: 0.02 K/UL — SIGNIFICANT CHANGE UP (ref 0–0.2)
BASOPHILS NFR BLD AUTO: 0 % — SIGNIFICANT CHANGE UP (ref 0–1)
BASOPHILS NFR BLD AUTO: 0.1 % — SIGNIFICANT CHANGE UP (ref 0–1)
BASOPHILS NFR BLD AUTO: 0.1 % — SIGNIFICANT CHANGE UP (ref 0–1)
EOSINOPHIL # BLD AUTO: 0 K/UL — SIGNIFICANT CHANGE UP (ref 0–0.7)
EOSINOPHIL # BLD AUTO: 0.01 K/UL — SIGNIFICANT CHANGE UP (ref 0–0.7)
EOSINOPHIL # BLD AUTO: 0.62 K/UL — SIGNIFICANT CHANGE UP (ref 0–0.7)
EOSINOPHIL NFR BLD AUTO: 0 % — SIGNIFICANT CHANGE UP (ref 0–8)
EOSINOPHIL NFR BLD AUTO: 0.1 % — SIGNIFICANT CHANGE UP (ref 0–8)
EOSINOPHIL NFR BLD AUTO: 5.2 % — SIGNIFICANT CHANGE UP (ref 0–8)
FETAL SCREEN: SIGNIFICANT CHANGE UP
FIBRINOGEN PPP-MCNC: 620 MG/DL — HIGH (ref 200–435)
GLUCOSE BLDC GLUCOMTR-MCNC: 115 MG/DL — HIGH (ref 70–99)
GLUCOSE BLDC GLUCOMTR-MCNC: 134 MG/DL — HIGH (ref 70–99)
GLUCOSE BLDC GLUCOMTR-MCNC: 143 MG/DL — HIGH (ref 70–99)
GLUCOSE BLDC GLUCOMTR-MCNC: 170 MG/DL — HIGH (ref 70–99)
GLUCOSE BLDC GLUCOMTR-MCNC: 71 MG/DL — SIGNIFICANT CHANGE UP (ref 70–99)
GLUCOSE BLDC GLUCOMTR-MCNC: 99 MG/DL — SIGNIFICANT CHANGE UP (ref 70–99)
HCT VFR BLD CALC: 27.2 % — LOW (ref 37–47)
HCT VFR BLD CALC: 37.4 % — SIGNIFICANT CHANGE UP (ref 37–47)
HCT VFR BLD CALC: 38.6 % — SIGNIFICANT CHANGE UP (ref 37–47)
HGB BLD-MCNC: 12 G/DL — SIGNIFICANT CHANGE UP (ref 12–16)
HGB BLD-MCNC: 12.3 G/DL — SIGNIFICANT CHANGE UP (ref 12–16)
HGB BLD-MCNC: 8.8 G/DL — LOW (ref 12–16)
IMM GRANULOCYTES NFR BLD AUTO: 0.6 % — HIGH (ref 0.1–0.3)
IMM GRANULOCYTES NFR BLD AUTO: 0.6 % — HIGH (ref 0.1–0.3)
INR BLD: 0.91 RATIO — SIGNIFICANT CHANGE UP (ref 0.65–1.3)
LYMPHOCYTES # BLD AUTO: 0.22 K/UL — LOW (ref 1.2–3.4)
LYMPHOCYTES # BLD AUTO: 1.11 K/UL — LOW (ref 1.2–3.4)
LYMPHOCYTES # BLD AUTO: 1.8 % — LOW (ref 20.5–51.1)
LYMPHOCYTES # BLD AUTO: 1.95 K/UL — SIGNIFICANT CHANGE UP (ref 1.2–3.4)
LYMPHOCYTES # BLD AUTO: 13.8 % — LOW (ref 20.5–51.1)
LYMPHOCYTES # BLD AUTO: 8.1 % — LOW (ref 20.5–51.1)
MANUAL SMEAR VERIFICATION: SIGNIFICANT CHANGE UP
MCHC RBC-ENTMCNC: 26.5 PG — LOW (ref 27–31)
MCHC RBC-ENTMCNC: 26.5 PG — LOW (ref 27–31)
MCHC RBC-ENTMCNC: 26.8 PG — LOW (ref 27–31)
MCHC RBC-ENTMCNC: 31.9 G/DL — LOW (ref 32–37)
MCHC RBC-ENTMCNC: 32.1 G/DL — SIGNIFICANT CHANGE UP (ref 32–37)
MCHC RBC-ENTMCNC: 32.4 G/DL — SIGNIFICANT CHANGE UP (ref 32–37)
MCV RBC AUTO: 81.9 FL — SIGNIFICANT CHANGE UP (ref 81–99)
MCV RBC AUTO: 82.6 FL — SIGNIFICANT CHANGE UP (ref 81–99)
MCV RBC AUTO: 84.1 FL — SIGNIFICANT CHANGE UP (ref 81–99)
MONOCYTES # BLD AUTO: 0.2 K/UL — SIGNIFICANT CHANGE UP (ref 0.1–0.6)
MONOCYTES # BLD AUTO: 0.37 K/UL — SIGNIFICANT CHANGE UP (ref 0.1–0.6)
MONOCYTES # BLD AUTO: 0.66 K/UL — HIGH (ref 0.1–0.6)
MONOCYTES NFR BLD AUTO: 1.7 % — SIGNIFICANT CHANGE UP (ref 1.7–9.3)
MONOCYTES NFR BLD AUTO: 2.7 % — SIGNIFICANT CHANGE UP (ref 1.7–9.3)
MONOCYTES NFR BLD AUTO: 4.7 % — SIGNIFICANT CHANGE UP (ref 1.7–9.3)
NEUTROPHILS # BLD AUTO: 10.94 K/UL — HIGH (ref 1.4–6.5)
NEUTROPHILS # BLD AUTO: 11.45 K/UL — HIGH (ref 1.4–6.5)
NEUTROPHILS # BLD AUTO: 12.07 K/UL — HIGH (ref 1.4–6.5)
NEUTROPHILS NFR BLD AUTO: 80.7 % — HIGH (ref 42.2–75.2)
NEUTROPHILS NFR BLD AUTO: 88.5 % — HIGH (ref 42.2–75.2)
NEUTROPHILS NFR BLD AUTO: 91.3 % — HIGH (ref 42.2–75.2)
NRBC # BLD: 0 /100 WBCS — SIGNIFICANT CHANGE UP (ref 0–0)
NRBC # BLD: 0 /100 WBCS — SIGNIFICANT CHANGE UP (ref 0–0)
OVALOCYTES BLD QL SMEAR: SLIGHT — SIGNIFICANT CHANGE UP
PLAT MORPH BLD: NORMAL — SIGNIFICANT CHANGE UP
PLATELET # BLD AUTO: 175 K/UL — SIGNIFICANT CHANGE UP (ref 130–400)
PLATELET # BLD AUTO: 229 K/UL — SIGNIFICANT CHANGE UP (ref 130–400)
PLATELET # BLD AUTO: 245 K/UL — SIGNIFICANT CHANGE UP (ref 130–400)
PMV BLD: 10.2 FL — SIGNIFICANT CHANGE UP (ref 7.4–10.4)
PMV BLD: 10.3 FL — SIGNIFICANT CHANGE UP (ref 7.4–10.4)
PMV BLD: 10.6 FL — HIGH (ref 7.4–10.4)
POIKILOCYTOSIS BLD QL AUTO: SLIGHT — SIGNIFICANT CHANGE UP
PROTHROM AB SERPL-ACNC: 10.4 SEC — SIGNIFICANT CHANGE UP (ref 9.95–12.87)
RBC # BLD: 3.32 M/UL — LOW (ref 4.2–5.4)
RBC # BLD: 4.53 M/UL — SIGNIFICANT CHANGE UP (ref 4.2–5.4)
RBC # BLD: 4.59 M/UL — SIGNIFICANT CHANGE UP (ref 4.2–5.4)
RBC # FLD: 14.4 % — SIGNIFICANT CHANGE UP (ref 11.5–14.5)
RBC # FLD: 14.4 % — SIGNIFICANT CHANGE UP (ref 11.5–14.5)
RBC # FLD: 14.7 % — HIGH (ref 11.5–14.5)
RBC BLD AUTO: ABNORMAL
SPHEROCYTES BLD QL SMEAR: SLIGHT — SIGNIFICANT CHANGE UP
WBC # BLD: 11.98 K/UL — HIGH (ref 4.8–10.8)
WBC # BLD: 13.65 K/UL — HIGH (ref 4.8–10.8)
WBC # BLD: 14.18 K/UL — HIGH (ref 4.8–10.8)
WBC # FLD AUTO: 11.98 K/UL — HIGH (ref 4.8–10.8)
WBC # FLD AUTO: 13.65 K/UL — HIGH (ref 4.8–10.8)
WBC # FLD AUTO: 14.18 K/UL — HIGH (ref 4.8–10.8)

## 2024-06-24 PROCEDURE — 99221 1ST HOSP IP/OBS SF/LOW 40: CPT

## 2024-06-24 PROCEDURE — 99223 1ST HOSP IP/OBS HIGH 75: CPT

## 2024-06-24 PROCEDURE — 88307 TISSUE EXAM BY PATHOLOGIST: CPT | Mod: 26

## 2024-06-24 PROCEDURE — 59514 CESAREAN DELIVERY ONLY: CPT | Mod: U7

## 2024-06-24 PROCEDURE — 74018 RADEX ABDOMEN 1 VIEW: CPT | Mod: 26

## 2024-06-24 RX ORDER — KETOROLAC TROMETHAMINE 30 MG/ML
30 INJECTION, SOLUTION INTRAMUSCULAR EVERY 6 HOURS
Refills: 0 | Status: DISCONTINUED | OUTPATIENT
Start: 2024-06-24 | End: 2024-06-24

## 2024-06-24 RX ORDER — ENOXAPARIN SODIUM 100 MG/ML
40 INJECTION SUBCUTANEOUS EVERY 12 HOURS
Refills: 0 | Status: DISCONTINUED | OUTPATIENT
Start: 2024-06-24 | End: 2024-06-30

## 2024-06-24 RX ORDER — OXYCODONE HYDROCHLORIDE 100 MG/5ML
5 SOLUTION ORAL
Refills: 0 | Status: COMPLETED | OUTPATIENT
Start: 2024-06-24 | End: 2024-07-01

## 2024-06-24 RX ORDER — DEXTROSE MONOHYDRATE AND SODIUM CHLORIDE 5; .3 G/100ML; G/100ML
1000 INJECTION, SOLUTION INTRAVENOUS
Refills: 0 | Status: DISCONTINUED | OUTPATIENT
Start: 2024-06-24 | End: 2024-06-30

## 2024-06-24 RX ORDER — INSULIN GLARGINE 100 [IU]/ML
27 INJECTION, SOLUTION SUBCUTANEOUS AT BEDTIME
Refills: 0 | Status: DISCONTINUED | OUTPATIENT
Start: 2024-06-24 | End: 2024-06-24

## 2024-06-24 RX ORDER — OXYTOCIN 30 [USP'U]/500ML
333.33 INJECTION, SOLUTION INTRAVENOUS
Qty: 20 | Refills: 0 | Status: DISCONTINUED | OUTPATIENT
Start: 2024-06-24 | End: 2024-06-30

## 2024-06-24 RX ORDER — CLINDAMYCIN PHOSPHATE 150 MG/ML
900 INJECTION, SOLUTION INTRAVENOUS ONCE
Refills: 0 | Status: COMPLETED | OUTPATIENT
Start: 2024-06-24 | End: 2024-06-24

## 2024-06-24 RX ORDER — DEXTROSE MONOHYDRATE 100 MG/ML
125 INJECTION, SOLUTION INTRAVENOUS ONCE
Refills: 0 | Status: DISCONTINUED | OUTPATIENT
Start: 2024-06-24 | End: 2024-06-30

## 2024-06-24 RX ORDER — SIMETHICONE 40MG/0.6ML
80 SUSPENSION, DROPS(FINAL DOSAGE FORM)(ML) ORAL EVERY 4 HOURS
Refills: 0 | Status: DISCONTINUED | OUTPATIENT
Start: 2024-06-24 | End: 2024-06-30

## 2024-06-24 RX ORDER — AZITHROMYCIN 250 MG/1
500 TABLET, FILM COATED ORAL ONCE
Refills: 0 | Status: DISCONTINUED | OUTPATIENT
Start: 2024-06-24 | End: 2024-06-24

## 2024-06-24 RX ORDER — DEXTROSE 30 % IN WATER 30 %
25 VIAL (ML) INTRAVENOUS ONCE
Refills: 0 | Status: DISCONTINUED | OUTPATIENT
Start: 2024-06-24 | End: 2024-06-30

## 2024-06-24 RX ORDER — OXYCODONE HYDROCHLORIDE 100 MG/5ML
5 SOLUTION ORAL ONCE
Refills: 0 | Status: DISCONTINUED | OUTPATIENT
Start: 2024-06-24 | End: 2024-06-30

## 2024-06-24 RX ORDER — DIPHENHYDRAMINE HCL 12.5MG/5ML
25 ELIXIR ORAL EVERY 6 HOURS
Refills: 0 | Status: DISCONTINUED | OUTPATIENT
Start: 2024-06-24 | End: 2024-06-30

## 2024-06-24 RX ORDER — CEFAZOLIN 10 G/1
2000 INJECTION, POWDER, FOR SOLUTION INTRAVENOUS ONCE
Refills: 0 | Status: DISCONTINUED | OUTPATIENT
Start: 2024-06-24 | End: 2024-06-24

## 2024-06-24 RX ORDER — DEXTROSE 30 % IN WATER 30 %
12.5 VIAL (ML) INTRAVENOUS ONCE
Refills: 0 | Status: DISCONTINUED | OUTPATIENT
Start: 2024-06-24 | End: 2024-06-30

## 2024-06-24 RX ORDER — DEXTROSE 30 % IN WATER 30 %
15 VIAL (ML) INTRAVENOUS ONCE
Refills: 0 | Status: DISCONTINUED | OUTPATIENT
Start: 2024-06-24 | End: 2024-06-30

## 2024-06-24 RX ORDER — ACETAMINOPHEN 325 MG
975 TABLET ORAL
Refills: 0 | Status: DISCONTINUED | OUTPATIENT
Start: 2024-06-24 | End: 2024-06-30

## 2024-06-24 RX ORDER — HYDROCORTISONE 100 MG/60ML
100 SUSPENSION RECTAL ONCE
Refills: 0 | Status: COMPLETED | OUTPATIENT
Start: 2024-06-24 | End: 2024-06-24

## 2024-06-24 RX ORDER — TETANUS TOXOID, REDUCED DIPHTHERIA TOXOID AND ACELLULAR PERTUSSIS VACCINE, ADSORBED 5; 2.5; 8; 8; 2.5 [IU]/.5ML; [IU]/.5ML; UG/.5ML; UG/.5ML; UG/.5ML
0.5 SUSPENSION INTRAMUSCULAR ONCE
Refills: 0 | Status: DISCONTINUED | OUTPATIENT
Start: 2024-06-24 | End: 2024-06-30

## 2024-06-24 RX ORDER — INSULIN GLARGINE 100 [IU]/ML
22 INJECTION, SOLUTION SUBCUTANEOUS EVERY MORNING
Refills: 0 | Status: DISCONTINUED | OUTPATIENT
Start: 2024-06-24 | End: 2024-06-24

## 2024-06-24 RX ORDER — CEFAZOLIN 10 G/1
3000 INJECTION, POWDER, FOR SOLUTION INTRAVENOUS ONCE
Refills: 0 | Status: DISCONTINUED | OUTPATIENT
Start: 2024-06-24 | End: 2024-06-24

## 2024-06-24 RX ORDER — INSULIN LISPRO 100 [IU]/ML
INJECTION, SOLUTION SUBCUTANEOUS AT BEDTIME
Refills: 0 | Status: DISCONTINUED | OUTPATIENT
Start: 2024-06-24 | End: 2024-06-30

## 2024-06-24 RX ORDER — INSULIN LISPRO 100 [IU]/ML
INJECTION, SOLUTION SUBCUTANEOUS
Refills: 0 | Status: DISCONTINUED | OUTPATIENT
Start: 2024-06-24 | End: 2024-06-30

## 2024-06-24 RX ORDER — ONDANSETRON HYDROCHLORIDE 2 MG/ML
4 INJECTION INTRAMUSCULAR; INTRAVENOUS ONCE
Refills: 0 | Status: COMPLETED | OUTPATIENT
Start: 2024-06-24 | End: 2024-06-24

## 2024-06-24 RX ORDER — GLUCAGON HYDROCHLORIDE 1 MG/ML
1 INJECTION, POWDER, FOR SOLUTION INTRAMUSCULAR; INTRAVENOUS; SUBCUTANEOUS ONCE
Refills: 0 | Status: DISCONTINUED | OUTPATIENT
Start: 2024-06-24 | End: 2024-06-30

## 2024-06-24 RX ORDER — LANOLIN
1 WAX (GRAM) MISCELLANEOUS EVERY 6 HOURS
Refills: 0 | Status: DISCONTINUED | OUTPATIENT
Start: 2024-06-24 | End: 2024-06-30

## 2024-06-24 RX ORDER — INSULIN LISPRO 100 [IU]/ML
7 INJECTION, SOLUTION SUBCUTANEOUS
Refills: 0 | Status: DISCONTINUED | OUTPATIENT
Start: 2024-06-24 | End: 2024-06-24

## 2024-06-24 RX ORDER — GENTAMICIN SULFATE 40 MG/ML
120 VIAL (ML) INJECTION ONCE
Refills: 0 | Status: COMPLETED | OUTPATIENT
Start: 2024-06-24 | End: 2024-06-24

## 2024-06-24 RX ORDER — FERROUS SULFATE 325(65) MG
325 TABLET ORAL DAILY
Refills: 0 | Status: DISCONTINUED | OUTPATIENT
Start: 2024-06-24 | End: 2024-06-30

## 2024-06-24 RX ADMIN — VALACYCLOVIR HYDROCHLORIDE 500 MILLIGRAM(S): 500 TABLET, FILM COATED ORAL at 10:40

## 2024-06-24 RX ADMIN — HYDROCORTISONE 100 MILLIGRAM(S): 100 SUSPENSION RECTAL at 05:37

## 2024-06-24 RX ADMIN — CLINDAMYCIN PHOSPHATE 100 MILLIGRAM(S): 150 INJECTION, SOLUTION INTRAVENOUS at 10:34

## 2024-06-24 RX ADMIN — KETOROLAC TROMETHAMINE 30 MILLIGRAM(S): 30 INJECTION, SOLUTION INTRAMUSCULAR at 18:27

## 2024-06-24 RX ADMIN — Medication 2 PUFF(S): at 20:29

## 2024-06-24 RX ADMIN — KETOROLAC TROMETHAMINE 30 MILLIGRAM(S): 30 INJECTION, SOLUTION INTRAMUSCULAR at 09:47

## 2024-06-24 RX ADMIN — Medication 975 MILLIGRAM(S): at 20:28

## 2024-06-24 RX ADMIN — ENOXAPARIN SODIUM 40 MILLIGRAM(S): 100 INJECTION SUBCUTANEOUS at 18:27

## 2024-06-24 RX ADMIN — Medication 200 MILLIGRAM(S): at 09:14

## 2024-06-24 RX ADMIN — INSULIN LISPRO 1: 100 INJECTION, SOLUTION SUBCUTANEOUS at 12:30

## 2024-06-24 RX ADMIN — Medication 975 MILLIGRAM(S): at 06:48

## 2024-06-24 RX ADMIN — Medication 975 MILLIGRAM(S): at 12:59

## 2024-06-24 RX ADMIN — Medication 2 PUFF(S): at 08:22

## 2024-06-24 RX ADMIN — ONDANSETRON HYDROCHLORIDE 4 MILLIGRAM(S): 2 INJECTION INTRAMUSCULAR; INTRAVENOUS at 12:42

## 2024-06-24 RX ADMIN — KETOROLAC TROMETHAMINE 30 MILLIGRAM(S): 30 INJECTION, SOLUTION INTRAMUSCULAR at 10:30

## 2024-06-24 RX ADMIN — Medication 975 MILLIGRAM(S): at 11:50

## 2024-06-24 RX ADMIN — Medication 600 MILLIGRAM(S): at 23:58

## 2024-06-24 RX ADMIN — VALACYCLOVIR HYDROCHLORIDE 500 MILLIGRAM(S): 500 TABLET, FILM COATED ORAL at 18:27

## 2024-06-24 RX ADMIN — DEXTROSE MONOHYDRATE AND SODIUM CHLORIDE 125 MILLILITER(S): 5; .3 INJECTION, SOLUTION INTRAVENOUS at 09:11

## 2024-06-24 RX ADMIN — Medication 975 MILLIGRAM(S): at 20:58

## 2024-06-24 RX ADMIN — Medication 600 MILLIGRAM(S): at 23:28

## 2024-06-24 RX ADMIN — MONTELUKAST SODIUM 10 MILLIGRAM(S): 10 TABLET, FILM COATED ORAL at 11:50

## 2024-06-24 RX ADMIN — Medication 325 MILLIGRAM(S): at 11:50

## 2024-06-25 ENCOUNTER — APPOINTMENT (OUTPATIENT)
Dept: ANTEPARTUM | Facility: CLINIC | Age: 34
End: 2024-06-25

## 2024-06-25 LAB
CULTURE RESULTS: SIGNIFICANT CHANGE UP
GLUCOSE BLDC GLUCOMTR-MCNC: 135 MG/DL — HIGH (ref 70–99)
GLUCOSE BLDC GLUCOMTR-MCNC: 160 MG/DL — HIGH (ref 70–99)
GLUCOSE BLDC GLUCOMTR-MCNC: 81 MG/DL — SIGNIFICANT CHANGE UP (ref 70–99)
GROUP B BETA STREP DNA (PCR): DETECTED
SOURCE GROUP B STREP: SIGNIFICANT CHANGE UP
SPECIMEN SOURCE: SIGNIFICANT CHANGE UP

## 2024-06-25 PROCEDURE — 99232 SBSQ HOSP IP/OBS MODERATE 35: CPT

## 2024-06-25 RX ORDER — OXYCODONE HYDROCHLORIDE 100 MG/5ML
5 SOLUTION ORAL
Refills: 0 | Status: DISCONTINUED | OUTPATIENT
Start: 2024-06-25 | End: 2024-06-30

## 2024-06-25 RX ADMIN — Medication 975 MILLIGRAM(S): at 09:49

## 2024-06-25 RX ADMIN — Medication 975 MILLIGRAM(S): at 14:52

## 2024-06-25 RX ADMIN — Medication 600 MILLIGRAM(S): at 17:44

## 2024-06-25 RX ADMIN — ENOXAPARIN SODIUM 40 MILLIGRAM(S): 100 INJECTION SUBCUTANEOUS at 05:45

## 2024-06-25 RX ADMIN — Medication 600 MILLIGRAM(S): at 05:45

## 2024-06-25 RX ADMIN — Medication 80 MILLIGRAM(S): at 16:03

## 2024-06-25 RX ADMIN — VALACYCLOVIR HYDROCHLORIDE 500 MILLIGRAM(S): 500 TABLET, FILM COATED ORAL at 05:45

## 2024-06-25 RX ADMIN — Medication 975 MILLIGRAM(S): at 03:25

## 2024-06-25 RX ADMIN — Medication 975 MILLIGRAM(S): at 15:22

## 2024-06-25 RX ADMIN — VALACYCLOVIR HYDROCHLORIDE 500 MILLIGRAM(S): 500 TABLET, FILM COATED ORAL at 17:44

## 2024-06-25 RX ADMIN — ENOXAPARIN SODIUM 40 MILLIGRAM(S): 100 INJECTION SUBCUTANEOUS at 16:03

## 2024-06-25 RX ADMIN — Medication 600 MILLIGRAM(S): at 12:54

## 2024-06-25 RX ADMIN — Medication 325 MILLIGRAM(S): at 12:54

## 2024-06-25 RX ADMIN — OXYCODONE HYDROCHLORIDE 5 MILLIGRAM(S): 100 SOLUTION ORAL at 22:20

## 2024-06-25 RX ADMIN — Medication 975 MILLIGRAM(S): at 02:55

## 2024-06-25 RX ADMIN — MONTELUKAST SODIUM 10 MILLIGRAM(S): 10 TABLET, FILM COATED ORAL at 17:33

## 2024-06-25 RX ADMIN — Medication 600 MILLIGRAM(S): at 06:15

## 2024-06-25 RX ADMIN — Medication 975 MILLIGRAM(S): at 10:19

## 2024-06-25 RX ADMIN — Medication 600 MILLIGRAM(S): at 18:43

## 2024-06-25 RX ADMIN — OXYCODONE HYDROCHLORIDE 5 MILLIGRAM(S): 100 SOLUTION ORAL at 21:50

## 2024-06-25 RX ADMIN — Medication 30 MILLILITER(S): at 16:03

## 2024-06-25 RX ADMIN — Medication 600 MILLIGRAM(S): at 13:24

## 2024-06-25 RX ADMIN — Medication 2 PUFF(S): at 22:10

## 2024-06-26 LAB
A VAGINAE DNA VAG QL NAA+PROBE: SIGNIFICANT CHANGE UP
BVAB2 DNA VAG QL NAA+PROBE: SIGNIFICANT CHANGE UP
C ALBICANS DNA VAG QL NAA+PROBE: NEGATIVE — SIGNIFICANT CHANGE UP
C GLABRATA DNA VAG QL NAA+PROBE: NEGATIVE — SIGNIFICANT CHANGE UP
C KRUSEI DNA VAG QL NAA+PROBE: NEGATIVE — SIGNIFICANT CHANGE UP
C LUSITANIAE DNA VAG QL NAA+PROBE: NEGATIVE — SIGNIFICANT CHANGE UP
C TRACH RRNA SPEC QL NAA+PROBE: NEGATIVE — SIGNIFICANT CHANGE UP
CANDIDA DNA VAG QL NAA+PROBE: NEGATIVE — SIGNIFICANT CHANGE UP
GLUCOSE BLDC GLUCOMTR-MCNC: 126 MG/DL — HIGH (ref 70–99)
GLUCOSE BLDC GLUCOMTR-MCNC: 132 MG/DL — HIGH (ref 70–99)
GLUCOSE BLDC GLUCOMTR-MCNC: 169 MG/DL — HIGH (ref 70–99)
MEGA1 DNA VAG QL NAA+PROBE: SIGNIFICANT CHANGE UP
N GONORRHOEA RRNA SPEC QL NAA+PROBE: NEGATIVE — SIGNIFICANT CHANGE UP
T VAGINALIS RRNA SPEC QL NAA+PROBE: NEGATIVE — SIGNIFICANT CHANGE UP

## 2024-06-26 PROCEDURE — 99232 SBSQ HOSP IP/OBS MODERATE 35: CPT

## 2024-06-26 RX ORDER — SIMETHICONE 40MG/0.6ML
1 SUSPENSION, DROPS(FINAL DOSAGE FORM)(ML) ORAL
Qty: 30 | Refills: 0
Start: 2024-06-26 | End: 2024-06-30

## 2024-06-26 RX ORDER — OXYCODONE HYDROCHLORIDE 100 MG/5ML
1 SOLUTION ORAL
Qty: 7 | Refills: 0
Start: 2024-06-26

## 2024-06-26 RX ORDER — VALACYCLOVIR 500 MG/1
1 TABLET, FILM COATED ORAL
Refills: 0 | DISCHARGE

## 2024-06-26 RX ORDER — ACETAMINOPHEN 325 MG
3 TABLET ORAL
Qty: 84 | Refills: 0
Start: 2024-06-26 | End: 2024-07-02

## 2024-06-26 RX ADMIN — Medication 600 MILLIGRAM(S): at 05:56

## 2024-06-26 RX ADMIN — Medication 975 MILLIGRAM(S): at 21:00

## 2024-06-26 RX ADMIN — OXYCODONE HYDROCHLORIDE 5 MILLIGRAM(S): 100 SOLUTION ORAL at 13:29

## 2024-06-26 RX ADMIN — INSULIN LISPRO 1: 100 INJECTION, SOLUTION SUBCUTANEOUS at 18:32

## 2024-06-26 RX ADMIN — VALACYCLOVIR HYDROCHLORIDE 500 MILLIGRAM(S): 500 TABLET, FILM COATED ORAL at 05:55

## 2024-06-26 RX ADMIN — ENOXAPARIN SODIUM 40 MILLIGRAM(S): 100 INJECTION SUBCUTANEOUS at 03:42

## 2024-06-26 RX ADMIN — Medication 600 MILLIGRAM(S): at 23:57

## 2024-06-26 RX ADMIN — Medication 2 PUFF(S): at 23:57

## 2024-06-26 RX ADMIN — ENOXAPARIN SODIUM 40 MILLIGRAM(S): 100 INJECTION SUBCUTANEOUS at 15:23

## 2024-06-26 RX ADMIN — Medication 975 MILLIGRAM(S): at 20:35

## 2024-06-26 RX ADMIN — Medication 325 MILLIGRAM(S): at 13:27

## 2024-06-26 RX ADMIN — Medication 600 MILLIGRAM(S): at 00:05

## 2024-06-26 RX ADMIN — Medication 975 MILLIGRAM(S): at 15:23

## 2024-06-26 RX ADMIN — OXYCODONE HYDROCHLORIDE 5 MILLIGRAM(S): 100 SOLUTION ORAL at 14:00

## 2024-06-26 RX ADMIN — Medication 600 MILLIGRAM(S): at 18:09

## 2024-06-26 RX ADMIN — Medication 975 MILLIGRAM(S): at 03:47

## 2024-06-26 RX ADMIN — OXYCODONE HYDROCHLORIDE 5 MILLIGRAM(S): 100 SOLUTION ORAL at 08:50

## 2024-06-26 RX ADMIN — MONTELUKAST SODIUM 10 MILLIGRAM(S): 10 TABLET, FILM COATED ORAL at 13:24

## 2024-06-26 RX ADMIN — Medication 600 MILLIGRAM(S): at 17:39

## 2024-06-26 RX ADMIN — Medication 975 MILLIGRAM(S): at 15:53

## 2024-06-26 RX ADMIN — OXYCODONE HYDROCHLORIDE 5 MILLIGRAM(S): 100 SOLUTION ORAL at 07:50

## 2024-06-26 RX ADMIN — Medication 2 PUFF(S): at 07:50

## 2024-06-26 RX ADMIN — Medication 975 MILLIGRAM(S): at 04:17

## 2024-06-26 RX ADMIN — VALACYCLOVIR HYDROCHLORIDE 500 MILLIGRAM(S): 500 TABLET, FILM COATED ORAL at 17:39

## 2024-06-27 LAB
GLUCOSE BLDC GLUCOMTR-MCNC: 128 MG/DL — HIGH (ref 70–99)
GLUCOSE BLDC GLUCOMTR-MCNC: 142 MG/DL — HIGH (ref 70–99)
GLUCOSE BLDC GLUCOMTR-MCNC: 161 MG/DL — HIGH (ref 70–99)
GLUCOSE BLDC GLUCOMTR-MCNC: 163 MG/DL — HIGH (ref 70–99)
GLUCOSE BLDC GLUCOMTR-MCNC: 171 MG/DL — HIGH (ref 70–99)

## 2024-06-27 PROCEDURE — 99232 SBSQ HOSP IP/OBS MODERATE 35: CPT

## 2024-06-27 PROCEDURE — 93970 EXTREMITY STUDY: CPT | Mod: 26

## 2024-06-27 RX ADMIN — Medication 325 MILLIGRAM(S): at 11:59

## 2024-06-27 RX ADMIN — Medication 2 PUFF(S): at 08:00

## 2024-06-27 RX ADMIN — Medication 600 MILLIGRAM(S): at 12:30

## 2024-06-27 RX ADMIN — Medication 600 MILLIGRAM(S): at 18:14

## 2024-06-27 RX ADMIN — ENOXAPARIN SODIUM 40 MILLIGRAM(S): 100 INJECTION SUBCUTANEOUS at 18:14

## 2024-06-27 RX ADMIN — VALACYCLOVIR HYDROCHLORIDE 500 MILLIGRAM(S): 500 TABLET, FILM COATED ORAL at 06:19

## 2024-06-27 RX ADMIN — VALACYCLOVIR HYDROCHLORIDE 500 MILLIGRAM(S): 500 TABLET, FILM COATED ORAL at 18:13

## 2024-06-27 RX ADMIN — Medication 600 MILLIGRAM(S): at 06:19

## 2024-06-27 RX ADMIN — INSULIN LISPRO 1: 100 INJECTION, SOLUTION SUBCUTANEOUS at 09:12

## 2024-06-27 RX ADMIN — Medication 600 MILLIGRAM(S): at 06:55

## 2024-06-27 RX ADMIN — Medication 975 MILLIGRAM(S): at 04:00

## 2024-06-27 RX ADMIN — ENOXAPARIN SODIUM 40 MILLIGRAM(S): 100 INJECTION SUBCUTANEOUS at 03:26

## 2024-06-27 RX ADMIN — Medication 600 MILLIGRAM(S): at 11:59

## 2024-06-27 RX ADMIN — Medication 600 MILLIGRAM(S): at 18:45

## 2024-06-27 RX ADMIN — Medication 975 MILLIGRAM(S): at 08:30

## 2024-06-27 RX ADMIN — MONTELUKAST SODIUM 10 MILLIGRAM(S): 10 TABLET, FILM COATED ORAL at 11:59

## 2024-06-27 RX ADMIN — Medication 600 MILLIGRAM(S): at 23:48

## 2024-06-27 RX ADMIN — Medication 600 MILLIGRAM(S): at 00:20

## 2024-06-27 RX ADMIN — Medication 975 MILLIGRAM(S): at 20:35

## 2024-06-27 RX ADMIN — Medication 2 PUFF(S): at 23:49

## 2024-06-27 RX ADMIN — Medication 975 MILLIGRAM(S): at 22:00

## 2024-06-27 RX ADMIN — Medication 975 MILLIGRAM(S): at 15:29

## 2024-06-27 RX ADMIN — INSULIN LISPRO 1: 100 INJECTION, SOLUTION SUBCUTANEOUS at 13:33

## 2024-06-27 RX ADMIN — Medication 975 MILLIGRAM(S): at 16:00

## 2024-06-27 RX ADMIN — Medication 975 MILLIGRAM(S): at 03:26

## 2024-06-27 RX ADMIN — Medication 975 MILLIGRAM(S): at 08:01

## 2024-06-28 ENCOUNTER — APPOINTMENT (OUTPATIENT)
Dept: ANTEPARTUM | Facility: CLINIC | Age: 34
End: 2024-06-28

## 2024-06-28 LAB
GLUCOSE BLDC GLUCOMTR-MCNC: 141 MG/DL — HIGH (ref 70–99)
GLUCOSE BLDC GLUCOMTR-MCNC: 154 MG/DL — HIGH (ref 70–99)
GLUCOSE BLDC GLUCOMTR-MCNC: 170 MG/DL — HIGH (ref 70–99)
GLUCOSE BLDC GLUCOMTR-MCNC: 191 MG/DL — HIGH (ref 70–99)

## 2024-06-28 RX ORDER — INSULIN DETEMIR 100/ML
10 VIAL (ML) SUBCUTANEOUS
Refills: 0 | Status: DISCONTINUED | OUTPATIENT
Start: 2024-06-29 | End: 2024-06-29

## 2024-06-28 RX ORDER — INSULIN DETEMIR 100/ML
12 VIAL (ML) SUBCUTANEOUS
Qty: 0 | Refills: 0 | DISCHARGE
Start: 2024-06-28

## 2024-06-28 RX ADMIN — Medication 975 MILLIGRAM(S): at 03:17

## 2024-06-28 RX ADMIN — ENOXAPARIN SODIUM 40 MILLIGRAM(S): 100 INJECTION SUBCUTANEOUS at 17:16

## 2024-06-28 RX ADMIN — ENOXAPARIN SODIUM 40 MILLIGRAM(S): 100 INJECTION SUBCUTANEOUS at 03:17

## 2024-06-28 RX ADMIN — Medication 975 MILLIGRAM(S): at 21:20

## 2024-06-28 RX ADMIN — INSULIN LISPRO 1: 100 INJECTION, SOLUTION SUBCUTANEOUS at 17:22

## 2024-06-28 RX ADMIN — Medication 600 MILLIGRAM(S): at 06:15

## 2024-06-28 RX ADMIN — Medication 975 MILLIGRAM(S): at 20:50

## 2024-06-28 RX ADMIN — Medication 975 MILLIGRAM(S): at 04:00

## 2024-06-28 RX ADMIN — VALACYCLOVIR HYDROCHLORIDE 500 MILLIGRAM(S): 500 TABLET, FILM COATED ORAL at 06:14

## 2024-06-28 RX ADMIN — MONTELUKAST SODIUM 10 MILLIGRAM(S): 10 TABLET, FILM COATED ORAL at 12:40

## 2024-06-28 RX ADMIN — Medication 2 PUFF(S): at 20:50

## 2024-06-28 RX ADMIN — Medication 600 MILLIGRAM(S): at 17:18

## 2024-06-28 RX ADMIN — Medication 600 MILLIGRAM(S): at 06:59

## 2024-06-28 RX ADMIN — Medication 600 MILLIGRAM(S): at 00:18

## 2024-06-28 RX ADMIN — Medication 600 MILLIGRAM(S): at 23:37

## 2024-06-28 RX ADMIN — VALACYCLOVIR HYDROCHLORIDE 500 MILLIGRAM(S): 500 TABLET, FILM COATED ORAL at 17:18

## 2024-06-28 RX ADMIN — Medication 600 MILLIGRAM(S): at 11:26

## 2024-06-28 RX ADMIN — Medication 325 MILLIGRAM(S): at 12:40

## 2024-06-28 RX ADMIN — Medication 975 MILLIGRAM(S): at 09:54

## 2024-06-28 NOTE — HISTORY OF PRESENT ILLNESS
[FreeTextEntry1] : 24 MFM Att'g & PGY-3 f/u consult note: Ms Negro is referred by Dr Richey. 33y  at 32w2d (FORREST  by [facility]), with pregnancy c/b consultation for T2DM. This pregnancy conceived with ovulation induction on clomid. Patient is following up post 9 day hospital admission for acute asthma exacerbation, d/c'ed on . She was discharged on an increased insulin regimen: Levemir 40/48, Humalog 10/10/10. She was prescribed a Prednisone taper starting at 60mg qday, the plan is to decrease by 10mg every 2 days and hold at 20mg daily. We are increasing her evening Levemir from 48->52units until Saturday when she is on 30mg of prednisone. She states that her asthma has improved since leaving the hospital. She did not bring a FS log with her, states that her FS are high, fastings in the 112-120 range. Continues to eat a regular diet, with  some adjustment for diabetic considerations. During her inpatient admission noted to have elevated LFTs, Valtrex was d/c'ed and LFTs were trending down, plan is to not take Valtrex for prophylaxis. Taking ASA daily. Denies contractions, leakage of fluid, vaginal bleeding. Endorses good fetal movement.  PMHx: DM2: last A1c 6.6% 3/1. Previously on Ozempic and Syngarty, switched to Levermir BID , Lispro tid this pregnancy.                       Follows with endocrinologist Dr. Robles.    Asthma. Hospitalization end of 2024 Denies intubations. Currently on albuterol PRN, symbicort daily, and singular daily.    PCOS Meds:    albuterol PRN, symbicort bid, and singular daily.    Levemir 40AM/48PM, lispro 10/10/10 Metformin: 500mg daily at different times, forgets often. D/C'd . Allergies: seasonal OB Hx: , Missed AB with D&C, ectopic s/p MTX in  Gyn Hx: H/o HSV. Last outbreak in March. denies abnormal pap smears, fibroids, cysts, stds FH: denies pertinent hx, she was adopted. 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  Work is hectic, long hours, difficult to control diet.  Denies smoking, alcohol or drug use. Patient discussed barriers to care including difficulty with her insurance and lack of social support. FOB is  to another women and continues that relationship. She states he promised her when the baby is born he will leave that relationship to be with her. Patient is adopted. Patients adopted mother passed aware 2-3 months ago from liver cancer and adoptive father  from diabetes when she was 13. Her biological mother struggles with drug addiction. She states she has 1 friend, Marilee who has offered to help her. Iram's remaining family does not know about the pregnancy. She is reluctant to discuss it with them as she knows the FOB is  to another women. She knows she will eventually need to discuss the pregnancy with them. She currently lives alone with her dog and cat. No psychiatric history. Vaccinations: Vaccinated for CoVID x2 and Influenza last . Family Planning: per Dr Richey. ROS: as above.  Px: Attentive and cooperative woman, moves easily about room. VS: /80 HR: 84 bpm, O2sat: 99 %, Wt: 255<245<249<247<242<239<238lbs<239lbs<236<235 lbs  (forgot insulin) U dip: 250 glucose HEENT: Acne rosacea on her face. 1cm hordeolum on left eye. Heart: RRR, no M/R/G Lungs: CTAB Abd: soft, nontender, nondistended LE: no erythema, edema or pain. Atopic dermatitis on upper extremities.  LAB: Oneg/AntibNeg. HbEP AA 23 A1c 9.1, Hep B NR, Hep C NR, Rubella: equivocal, Frag X: neg, SMA: neg, TB Quant: Neg,CF neg, NIPTs LR 24: Panorama risk reducing. 3/1/24 A1C 6.6%;  mg/24 hours 3/28 UCx nl. MSAFP risk reducing. 3/2 TSH 1.87, T4 9, 24hr   Outpatient LFT trend AST/ALT  70/210, 67/170  56/190  50/193 6/2 36/153 6/3 29/131 6/4 28/119 6/5 50/134 6/18: 15/43.  OBUS: : S=D based on  sono : Normal NT screen 3/1: 16w2d, 186g anatomy wnl : 20w5d, LGA 98% (AC 94%) anatomy wnl but incomplete eval of heart  fetal echo wnl 5/3 25w2d 2lb7oz >99% MVP 7.85cm : SFVtx, at 32w2d. Ant plac. DVP 8.7cm. BPP 8/10: -2 for 60sec decel.    Impression/Recommendations: Ms Negro is a 34yo  at 32w2d [facility] FORREST 8/15/2024 with asthma and T2DM, with persistent hordeulum on her left eye for the past month. 1. T2DM: Worsening after inpatient steroid course, insulin req't increased significantly. Current Regimen: Levemir 40AM/48PM, lispro 10/10/10 New Regimen : Levemir 40AM/52PM, lispro 10/10/10 (decrease evening levemir back to 48 on Saturday after tapering to 30mg prednisone daily) -Previously discussed the risk of end organ damage, including eyes, heart, kidneys in patients with diabetes, as well as the risk of coma and death in patients with uncontrolled diabetes. Counseled on risks to fetus of poorly controlled diabetes in pregnancy. -C/w ASA 81 mg PO daily -S/p dietician counseling -3/1 24hr Previously recommended: - Dilated eye exam and foot exam. Opthamology s/p appointment with f/u scheduled. Referral for podiatry given previously given. - 3/1 EKG: NSR - Referral previously for cardiology given, encouraged to make appointment - Fetal echo - wnl  2. Obesity -Previously discussed that obesity is associated with a host of pregnancy complications. -The patient is at increased risk for preeclampsia and gestational hypertension,  delivery, macrosomia, gestational diabetes, deep venous thrombosis,  delivery, stillbirth and certain birth defects, such as open neural tube defects. - Will need weekly biophysical profiles in third trimester. - Cardiology consult pending 3. Asthma: -Recent prolonged inpatient admission for acute asthma exacerbation -Controlled with prednisone, nebulizer treatments q6h, symbicort daily, singulair daily, albuterol prn -Continue with current regimen, follows closely with Dr. Saad amador of [facility]. -->If nighttime wheezing, low threshold for PPI. 4. Ocular hordeulum, left - She had a ophthalmology consult during inpatient admission - warm compresses 5. Rosacea -Recommended gentle face cleanser, azelaic acid, and sunscreen -Dermatology referral given 6. RhNeg. First trimester vaginal bleeding, now resolved s/p Rhogam  - Rhogam given 6/10/24 7. Pregnancy -History of HSV previous prophylaxis with Valtrex, D/C'd for transaminitis. -->No acetaminopphen, no more valtrex.  If outbreak, consider acyclovir cream or ointment.  8. Fetal well being:  to L&D for prolonged deceleration on NST today.   MD Hiram, FACOG

## 2024-06-29 LAB
GLUCOSE BLDC GLUCOMTR-MCNC: 126 MG/DL — HIGH (ref 70–99)
GLUCOSE BLDC GLUCOMTR-MCNC: 137 MG/DL — HIGH (ref 70–99)
GLUCOSE BLDC GLUCOMTR-MCNC: 163 MG/DL — HIGH (ref 70–99)
GLUCOSE BLDC GLUCOMTR-MCNC: 174 MG/DL — HIGH (ref 70–99)
GLUCOSE BLDC GLUCOMTR-MCNC: 202 MG/DL — HIGH (ref 70–99)

## 2024-06-29 RX ORDER — INSULIN DETEMIR 100/ML
10 VIAL (ML) SUBCUTANEOUS
Refills: 0 | Status: DISCONTINUED | OUTPATIENT
Start: 2024-06-29 | End: 2024-06-30

## 2024-06-29 RX ORDER — INSULIN GLARGINE 100 [IU]/ML
10 INJECTION, SOLUTION SUBCUTANEOUS ONCE
Refills: 0 | Status: DISCONTINUED | OUTPATIENT
Start: 2024-06-29 | End: 2024-06-29

## 2024-06-29 RX ORDER — INSULIN REGULAR, HUMAN 100/ML
1 VIAL (ML) INJECTION EVERY 6 HOURS
Refills: 0 | Status: DISCONTINUED | OUTPATIENT
Start: 2024-06-29 | End: 2024-06-29

## 2024-06-29 RX ADMIN — Medication 600 MILLIGRAM(S): at 00:07

## 2024-06-29 RX ADMIN — ENOXAPARIN SODIUM 40 MILLIGRAM(S): 100 INJECTION SUBCUTANEOUS at 04:10

## 2024-06-29 RX ADMIN — Medication 600 MILLIGRAM(S): at 06:37

## 2024-06-29 RX ADMIN — VALACYCLOVIR HYDROCHLORIDE 500 MILLIGRAM(S): 500 TABLET, FILM COATED ORAL at 06:07

## 2024-06-29 RX ADMIN — Medication 2 PUFF(S): at 09:44

## 2024-06-29 RX ADMIN — Medication 975 MILLIGRAM(S): at 21:21

## 2024-06-29 RX ADMIN — VALACYCLOVIR HYDROCHLORIDE 500 MILLIGRAM(S): 500 TABLET, FILM COATED ORAL at 17:48

## 2024-06-29 RX ADMIN — Medication 975 MILLIGRAM(S): at 11:15

## 2024-06-29 RX ADMIN — Medication 2 PUFF(S): at 20:52

## 2024-06-29 RX ADMIN — INSULIN LISPRO 1: 100 INJECTION, SOLUTION SUBCUTANEOUS at 12:00

## 2024-06-29 RX ADMIN — Medication 325 MILLIGRAM(S): at 11:43

## 2024-06-29 RX ADMIN — MONTELUKAST SODIUM 10 MILLIGRAM(S): 10 TABLET, FILM COATED ORAL at 11:43

## 2024-06-29 RX ADMIN — Medication 975 MILLIGRAM(S): at 20:51

## 2024-06-29 RX ADMIN — Medication 600 MILLIGRAM(S): at 18:36

## 2024-06-29 RX ADMIN — Medication 600 MILLIGRAM(S): at 11:43

## 2024-06-29 RX ADMIN — Medication 975 MILLIGRAM(S): at 04:40

## 2024-06-29 RX ADMIN — Medication 600 MILLIGRAM(S): at 17:48

## 2024-06-29 RX ADMIN — Medication 600 MILLIGRAM(S): at 06:07

## 2024-06-29 RX ADMIN — Medication 975 MILLIGRAM(S): at 08:55

## 2024-06-29 RX ADMIN — Medication 10 UNIT(S): at 09:45

## 2024-06-29 RX ADMIN — Medication 975 MILLIGRAM(S): at 04:10

## 2024-06-29 RX ADMIN — Medication 600 MILLIGRAM(S): at 12:30

## 2024-06-30 VITALS
OXYGEN SATURATION: 100 % | DIASTOLIC BLOOD PRESSURE: 70 MMHG | HEART RATE: 73 BPM | TEMPERATURE: 99 F | SYSTOLIC BLOOD PRESSURE: 101 MMHG | RESPIRATION RATE: 18 BRPM

## 2024-06-30 LAB
GLUCOSE BLDC GLUCOMTR-MCNC: 127 MG/DL — HIGH (ref 70–99)
GLUCOSE BLDC GLUCOMTR-MCNC: 136 MG/DL — HIGH (ref 70–99)

## 2024-06-30 PROCEDURE — 99238 HOSP IP/OBS DSCHRG MGMT 30/<: CPT

## 2024-06-30 RX ORDER — INSULIN DETEMIR 100/ML
10 VIAL (ML) SUBCUTANEOUS
Qty: 1 | Refills: 0
Start: 2024-06-30 | End: 2024-08-28

## 2024-06-30 RX ORDER — INSULIN LISPRO 100 [IU]/ML
4 INJECTION, SOLUTION SUBCUTANEOUS
Qty: 1 | Refills: 0
Start: 2024-06-30 | End: 2024-08-28

## 2024-06-30 RX ADMIN — Medication 10 UNIT(S): at 08:30

## 2024-06-30 RX ADMIN — Medication 600 MILLIGRAM(S): at 06:45

## 2024-06-30 RX ADMIN — VALACYCLOVIR HYDROCHLORIDE 500 MILLIGRAM(S): 500 TABLET, FILM COATED ORAL at 06:16

## 2024-06-30 RX ADMIN — Medication 975 MILLIGRAM(S): at 08:53

## 2024-06-30 RX ADMIN — MONTELUKAST SODIUM 10 MILLIGRAM(S): 10 TABLET, FILM COATED ORAL at 12:11

## 2024-06-30 RX ADMIN — Medication 325 MILLIGRAM(S): at 12:11

## 2024-06-30 RX ADMIN — Medication 2 PUFF(S): at 08:53

## 2024-06-30 RX ADMIN — Medication 600 MILLIGRAM(S): at 00:52

## 2024-06-30 RX ADMIN — Medication 600 MILLIGRAM(S): at 06:15

## 2024-06-30 RX ADMIN — Medication 975 MILLIGRAM(S): at 09:53

## 2024-06-30 RX ADMIN — Medication 600 MILLIGRAM(S): at 13:11

## 2024-06-30 RX ADMIN — Medication 600 MILLIGRAM(S): at 00:22

## 2024-06-30 RX ADMIN — Medication 600 MILLIGRAM(S): at 12:11

## 2024-07-01 ENCOUNTER — APPOINTMENT (OUTPATIENT)
Dept: OBGYN | Facility: CLINIC | Age: 34
End: 2024-07-01

## 2024-07-01 DIAGNOSIS — O40.3XX0 POLYHYDRAMNIOS, THIRD TRIMESTER, NOT APPLICABLE OR UNSPECIFIED: ICD-10-CM

## 2024-07-01 DIAGNOSIS — O99.213 OBESITY COMPLICATING PREGNANCY, THIRD TRIMESTER: ICD-10-CM

## 2024-07-01 DIAGNOSIS — O98.513 OTHER VIRAL DISEASES COMPLICATING PREGNANCY, THIRD TRIMESTER: ICD-10-CM

## 2024-07-01 DIAGNOSIS — Z3A.32 32 WEEKS GESTATION OF PREGNANCY: ICD-10-CM

## 2024-07-01 DIAGNOSIS — O36.63X0 MATERNAL CARE FOR EXCESSIVE FETAL GROWTH, THIRD TRIMESTER, NOT APPLICABLE OR UNSPECIFIED: ICD-10-CM

## 2024-07-01 DIAGNOSIS — O09.513 SUPERVISION OF ELDERLY PRIMIGRAVIDA, THIRD TRIMESTER: ICD-10-CM

## 2024-07-01 DIAGNOSIS — O24.113 PRE-EXISTING TYPE 2 DIABETES MELLITUS, IN PREGNANCY, THIRD TRIMESTER: ICD-10-CM

## 2024-07-05 ENCOUNTER — APPOINTMENT (OUTPATIENT)
Dept: ANTEPARTUM | Facility: CLINIC | Age: 34
End: 2024-07-05
Payer: MEDICAID

## 2024-07-05 ENCOUNTER — OUTPATIENT (OUTPATIENT)
Dept: OUTPATIENT SERVICES | Facility: HOSPITAL | Age: 34
LOS: 1 days | End: 2024-07-05
Payer: MEDICAID

## 2024-07-05 VITALS
BODY MASS INDEX: 42.4 KG/M2 | HEART RATE: 74 BPM | SYSTOLIC BLOOD PRESSURE: 125 MMHG | DIASTOLIC BLOOD PRESSURE: 83 MMHG | OXYGEN SATURATION: 99 % | WEIGHT: 247 LBS

## 2024-07-05 DIAGNOSIS — Z98.890 OTHER SPECIFIED POSTPROCEDURAL STATES: Chronic | ICD-10-CM

## 2024-07-05 LAB
GLUCOSE BLDC GLUCOMTR-MCNC: 204
GLUCOSE BLDC GLUCOMTR-MCNC: 204 MG/DL — HIGH (ref 70–99)

## 2024-07-05 PROCEDURE — 99214 OFFICE O/P EST MOD 30 MIN: CPT | Mod: TH

## 2024-07-05 PROCEDURE — 99214 OFFICE O/P EST MOD 30 MIN: CPT

## 2024-07-05 PROCEDURE — 82948 REAGENT STRIP/BLOOD GLUCOSE: CPT

## 2024-07-05 PROCEDURE — 82962 GLUCOSE BLOOD TEST: CPT

## 2024-07-08 DIAGNOSIS — Z3A.32 32 WEEKS GESTATION OF PREGNANCY: ICD-10-CM

## 2024-07-08 DIAGNOSIS — L71.9 ROSACEA, UNSPECIFIED: ICD-10-CM

## 2024-07-08 DIAGNOSIS — Z67.41 TYPE O BLOOD, RH NEGATIVE: ICD-10-CM

## 2024-07-08 DIAGNOSIS — Z28.09 IMMUNIZATION NOT CARRIED OUT BECAUSE OF OTHER CONTRAINDICATION: ICD-10-CM

## 2024-07-08 DIAGNOSIS — O45.93 PREMATURE SEPARATION OF PLACENTA, UNSPECIFIED, THIRD TRIMESTER: ICD-10-CM

## 2024-07-08 DIAGNOSIS — B00.9 HERPESVIRAL INFECTION, UNSPECIFIED: ICD-10-CM

## 2024-07-08 DIAGNOSIS — J45.909 UNSPECIFIED ASTHMA, UNCOMPLICATED: ICD-10-CM

## 2024-07-08 DIAGNOSIS — Z79.4 LONG TERM (CURRENT) USE OF INSULIN: ICD-10-CM

## 2024-07-08 DIAGNOSIS — Z79.82 LONG TERM (CURRENT) USE OF ASPIRIN: ICD-10-CM

## 2024-07-08 DIAGNOSIS — O26.893 OTHER SPECIFIED PREGNANCY RELATED CONDITIONS, THIRD TRIMESTER: ICD-10-CM

## 2024-07-08 DIAGNOSIS — R03.0 ELEVATED BLOOD-PRESSURE READING, WITHOUT DIAGNOSIS OF HYPERTENSION: ICD-10-CM

## 2024-07-08 DIAGNOSIS — Z28.21 IMMUNIZATION NOT CARRIED OUT BECAUSE OF PATIENT REFUSAL: ICD-10-CM

## 2024-07-09 ENCOUNTER — APPOINTMENT (OUTPATIENT)
Dept: ANTEPARTUM | Facility: CLINIC | Age: 34
End: 2024-07-09

## 2024-07-10 DIAGNOSIS — J45.909 UNSPECIFIED ASTHMA, UNCOMPLICATED: ICD-10-CM

## 2024-07-10 DIAGNOSIS — E66.01 MORBID (SEVERE) OBESITY DUE TO EXCESS CALORIES: ICD-10-CM

## 2024-07-10 DIAGNOSIS — Z98.891 HISTORY OF UTERINE SCAR FROM PREVIOUS SURGERY: ICD-10-CM

## 2024-07-10 DIAGNOSIS — E11.8 TYPE 2 DIABETES MELLITUS WITH UNSPECIFIED COMPLICATIONS: ICD-10-CM

## 2024-07-10 DIAGNOSIS — O24.113 PRE-EXISTING TYPE 2 DIABETES MELLITUS, IN PREGNANCY, THIRD TRIMESTER: ICD-10-CM

## 2024-07-11 ENCOUNTER — NON-APPOINTMENT (OUTPATIENT)
Age: 34
End: 2024-07-11

## 2024-07-12 ENCOUNTER — APPOINTMENT (OUTPATIENT)
Dept: ANTEPARTUM | Facility: CLINIC | Age: 34
End: 2024-07-12

## 2024-07-12 ENCOUNTER — NON-APPOINTMENT (OUTPATIENT)
Age: 34
End: 2024-07-12

## 2024-07-16 ENCOUNTER — APPOINTMENT (OUTPATIENT)
Dept: ANTEPARTUM | Facility: CLINIC | Age: 34
End: 2024-07-16

## 2024-07-16 NOTE — ED PROVIDER NOTE - BIRTH SEX
Patient called [facility] called the office stating that this patient is leaving out the [place] 2 [place] and needs this script called in for today. Pharmacy states he can get his script filled earlier than 7/23/2024, but the provider has to override it.    Female

## 2024-07-19 ENCOUNTER — APPOINTMENT (OUTPATIENT)
Dept: ANTEPARTUM | Facility: CLINIC | Age: 34
End: 2024-07-19

## 2024-07-23 ENCOUNTER — APPOINTMENT (OUTPATIENT)
Dept: ANTEPARTUM | Facility: CLINIC | Age: 34
End: 2024-07-23

## 2024-07-26 ENCOUNTER — APPOINTMENT (OUTPATIENT)
Dept: ANTEPARTUM | Facility: CLINIC | Age: 34
End: 2024-07-26

## 2024-07-30 ENCOUNTER — APPOINTMENT (OUTPATIENT)
Dept: ANTEPARTUM | Facility: CLINIC | Age: 34
End: 2024-07-30

## 2024-07-30 ENCOUNTER — APPOINTMENT (OUTPATIENT)
Dept: OPHTHALMOLOGY | Facility: CLINIC | Age: 34
End: 2024-07-30

## 2024-08-02 ENCOUNTER — APPOINTMENT (OUTPATIENT)
Dept: ANTEPARTUM | Facility: CLINIC | Age: 34
End: 2024-08-02

## 2024-08-05 ENCOUNTER — RX RENEWAL (OUTPATIENT)
Age: 34
End: 2024-08-05

## 2024-08-06 ENCOUNTER — APPOINTMENT (OUTPATIENT)
Dept: ANTEPARTUM | Facility: CLINIC | Age: 34
End: 2024-08-06

## 2024-08-09 ENCOUNTER — APPOINTMENT (OUTPATIENT)
Dept: ANTEPARTUM | Facility: CLINIC | Age: 34
End: 2024-08-09

## 2024-08-12 ENCOUNTER — APPOINTMENT (OUTPATIENT)
Dept: OBGYN | Facility: CLINIC | Age: 34
End: 2024-08-12
Payer: MEDICAID

## 2024-08-12 VITALS
WEIGHT: 243.19 LBS | DIASTOLIC BLOOD PRESSURE: 68 MMHG | BODY MASS INDEX: 41.52 KG/M2 | HEIGHT: 64 IN | SYSTOLIC BLOOD PRESSURE: 110 MMHG

## 2024-08-12 DIAGNOSIS — O24.919 UNSPECIFIED DIABETES MELLITUS IN PREGNANCY, UNSPECIFIED TRIMESTER: ICD-10-CM

## 2024-08-12 DIAGNOSIS — Z87.59 PERSONAL HISTORY OF OTHER COMPLICATIONS OF PREGNANCY, CHILDBIRTH AND THE PUERPERIUM: ICD-10-CM

## 2024-08-12 PROCEDURE — 99213 OFFICE O/P EST LOW 20 MIN: CPT | Mod: TH

## 2024-08-12 RX ORDER — DROSPIRENONE AND ETHINYL ESTRADIOL 0.03MG-3MG
3-0.03 KIT ORAL DAILY
Qty: 1 | Refills: 11 | Status: ACTIVE | COMMUNITY
Start: 2024-08-12 | End: 1900-01-01

## 2024-08-12 NOTE — HISTORY OF PRESENT ILLNESS
[Postpartum Follow Up] : postpartum follow up [Complications:___] : no complications [] : delivered by vaginal delivery [Breastfeeding] : not currently nursing [S/Sx PP Depression] : no signs/symptoms of postpartum depression [Erythema] : not erythematous [Back to Normal] : is back to normal in size [Mild] : mild vaginal bleeding [Normal] : the vagina was normal [Cervix Sample Taken] : cervical sample not taken for a Pap smear [Not Done] : Examination of breasts not done [Doing Well] : is doing well [No Sign of Infection] : is showing no signs of infection [Excellent Pain Control] : has excellent pain control [None] : None [FreeTextEntry1] : S/P  for abruption about 6 weeks ago. Mother and baby are well. Patient is breast feeding. Has a period about 1 week ago. Sexually active with FOB (who happens to be  to another woman with 2 children) Not depressed.  Exam: NL   Plan: Start OCPs (Kayleen) recommend f/u with medicine and/or endocrinology for diabetes I recommend she go back on ozempic 3 months postpartum f/u 6 months for annual gyn visit.

## 2024-08-13 ENCOUNTER — APPOINTMENT (OUTPATIENT)
Dept: ANTEPARTUM | Facility: CLINIC | Age: 34
End: 2024-08-13

## 2024-08-16 ENCOUNTER — APPOINTMENT (OUTPATIENT)
Dept: ANTEPARTUM | Facility: CLINIC | Age: 34
End: 2024-08-16

## 2024-08-23 ENCOUNTER — APPOINTMENT (OUTPATIENT)
Dept: ANTEPARTUM | Facility: CLINIC | Age: 34
End: 2024-08-23

## 2024-12-06 NOTE — OB RN TRIAGE NOTE - NS_DISCHARGEPRINT_OBGYN_ALL_OB
 OB Discharge Instructions

## 2024-12-31 ENCOUNTER — NON-APPOINTMENT (OUTPATIENT)
Age: 34
End: 2024-12-31

## 2025-02-10 ENCOUNTER — APPOINTMENT (OUTPATIENT)
Dept: OBGYN | Facility: CLINIC | Age: 35
End: 2025-02-10

## 2025-03-03 NOTE — OB PROVIDER H&P - PRO VDRL INFANT
Start the Arnuity ellipta one inhalation daily to calm the airways in your lungs. Be sure to rinse your mouth and brush your tongue so you do not get thrush which is yeast on the tongue.    Use your albuterol inhaler 2 puffs every 4 hours as needed for chest tightness, shortness of breath or wheezing.    Push fluids and stay well hydrated.    You may use Mucinex as needed for the cough.    See me in 4 weeks for your annual physical. Contact the office sooner if you are having increased difficulty breathing.  
negative

## 2025-03-24 ENCOUNTER — APPOINTMENT (OUTPATIENT)
Dept: OBGYN | Facility: CLINIC | Age: 35
End: 2025-03-24
Payer: MEDICAID

## 2025-03-24 ENCOUNTER — NON-APPOINTMENT (OUTPATIENT)
Age: 35
End: 2025-03-24

## 2025-03-24 ENCOUNTER — LABORATORY RESULT (OUTPATIENT)
Age: 35
End: 2025-03-24

## 2025-03-24 ENCOUNTER — OUTPATIENT (OUTPATIENT)
Dept: OUTPATIENT SERVICES | Facility: HOSPITAL | Age: 35
LOS: 1 days | End: 2025-03-24
Payer: MEDICAID

## 2025-03-24 VITALS — WEIGHT: 262 LBS | DIASTOLIC BLOOD PRESSURE: 69 MMHG | SYSTOLIC BLOOD PRESSURE: 104 MMHG | BODY MASS INDEX: 44.97 KG/M2

## 2025-03-24 DIAGNOSIS — Z01.419 ENCOUNTER FOR GYNECOLOGICAL EXAMINATION (GENERAL) (ROUTINE) W/OUT ABNORMAL FINDINGS: ICD-10-CM

## 2025-03-24 DIAGNOSIS — Z98.890 OTHER SPECIFIED POSTPROCEDURAL STATES: Chronic | ICD-10-CM

## 2025-03-24 DIAGNOSIS — Z00.00 ENCOUNTER FOR GENERAL ADULT MEDICAL EXAMINATION WITHOUT ABNORMAL FINDINGS: ICD-10-CM

## 2025-03-24 PROCEDURE — 99395 PREV VISIT EST AGE 18-39: CPT

## 2025-03-24 PROCEDURE — 87591 N.GONORRHOEAE DNA AMP PROB: CPT

## 2025-03-24 PROCEDURE — 87661 TRICHOMONAS VAGINALIS AMPLIF: CPT

## 2025-03-24 PROCEDURE — 99459 PELVIC EXAMINATION: CPT

## 2025-03-24 PROCEDURE — 87491 CHLMYD TRACH DNA AMP PROBE: CPT

## 2025-03-24 PROCEDURE — T1013: CPT

## 2025-03-25 DIAGNOSIS — Z01.419 ENCOUNTER FOR GYNECOLOGICAL EXAMINATION (GENERAL) (ROUTINE) WITHOUT ABNORMAL FINDINGS: ICD-10-CM

## 2025-03-26 ENCOUNTER — NON-APPOINTMENT (OUTPATIENT)
Age: 35
End: 2025-03-26

## 2025-04-04 ENCOUNTER — NON-APPOINTMENT (OUTPATIENT)
Age: 35
End: 2025-04-04

## 2025-04-19 NOTE — OB RN TRIAGE NOTE - NS_OBACUITYLEVEL_OBGYN_ALL_OB
2 Jose Chavez  Orthopaedic Surgery  [address] [place] [id number]  Phone: [phone]  Fax: [phone]  Follow Up Time: 1-3 Days
